# Patient Record
Sex: FEMALE | Race: WHITE | Employment: OTHER | URBAN - METROPOLITAN AREA
[De-identification: names, ages, dates, MRNs, and addresses within clinical notes are randomized per-mention and may not be internally consistent; named-entity substitution may affect disease eponyms.]

---

## 2017-11-16 ENCOUNTER — HOSPITAL ENCOUNTER (OUTPATIENT)
Dept: GENERAL RADIOLOGY | Age: 82
Discharge: HOME | End: 2017-11-16
Attending: FAMILY MEDICINE | Admitting: FAMILY MEDICINE

## 2017-11-16 DIAGNOSIS — M54.5 LOW BACK PAIN, UNSPECIFIED BACK PAIN LATERALITY, UNSPECIFIED CHRONICITY, WITH SCIATICA PRESENCE UNSPECIFIED: ICD-10-CM

## 2018-08-06 ENCOUNTER — HOSPITAL ENCOUNTER (OUTPATIENT)
Dept: GENERAL RADIOLOGY | Age: 83
Discharge: HOME | End: 2018-08-06
Attending: FAMILY MEDICINE | Admitting: FAMILY MEDICINE

## 2018-08-06 DIAGNOSIS — M54.41 ACUTE BACK PAIN WITH SCIATICA, RIGHT: ICD-10-CM

## 2018-08-06 DIAGNOSIS — M25.551 RIGHT HIP PAIN: ICD-10-CM

## 2020-08-13 ENCOUNTER — HOSPITAL ENCOUNTER (OUTPATIENT)
Age: 85
Setting detail: SPECIMEN
Discharge: HOME | End: 2020-08-13
Payer: MEDICARE

## 2020-08-13 PROCEDURE — U0002 COVID-19 LAB TEST NON-CDC: HCPCS

## 2020-08-14 LAB
SARS-COV-2: NOT DETECTED
SOURCE: NORMAL

## 2021-01-07 ENCOUNTER — HOSPITAL ENCOUNTER (INPATIENT)
Age: 86
LOS: 5 days | Discharge: HOME HEALTH | DRG: 378 | End: 2021-01-12
Attending: EMERGENCY MEDICINE | Admitting: HOSPITALIST
Payer: MEDICARE

## 2021-01-07 ENCOUNTER — APPOINTMENT (OUTPATIENT)
Dept: CT IMAGING | Age: 86
DRG: 378 | End: 2021-01-07
Payer: MEDICARE

## 2021-01-07 DIAGNOSIS — K62.5 RECTAL BLEEDING: Primary | ICD-10-CM

## 2021-01-07 DIAGNOSIS — Z79.01 ANTICOAGULATED: ICD-10-CM

## 2021-01-07 LAB
ABO/RH: NORMAL
ABO/RH: NORMAL
ALBUMIN SERPL-MCNC: 3.8 GM/DL (ref 3.4–5)
ALP BLD-CCNC: 75 IU/L (ref 40–128)
ALT SERPL-CCNC: 14 U/L (ref 10–40)
ANION GAP SERPL CALCULATED.3IONS-SCNC: 11 MMOL/L (ref 4–16)
ANTIBODY SCREEN: NEGATIVE
ANTIBODY SCREEN: NEGATIVE
AST SERPL-CCNC: 22 IU/L (ref 15–37)
BASOPHILS ABSOLUTE: 0.1 K/CU MM
BASOPHILS RELATIVE PERCENT: 0.7 % (ref 0–1)
BILIRUB SERPL-MCNC: 0.6 MG/DL (ref 0–1)
BUN BLDV-MCNC: 16 MG/DL (ref 6–23)
CALCIUM SERPL-MCNC: 8.5 MG/DL (ref 8.3–10.6)
CHLORIDE BLD-SCNC: 110 MMOL/L (ref 99–110)
CO2: 24 MMOL/L (ref 21–32)
CREAT SERPL-MCNC: 1 MG/DL (ref 0.6–1.1)
DIFFERENTIAL TYPE: ABNORMAL
EOSINOPHILS ABSOLUTE: 0.1 K/CU MM
EOSINOPHILS RELATIVE PERCENT: 0.8 % (ref 0–3)
GFR AFRICAN AMERICAN: >60 ML/MIN/1.73M2
GFR NON-AFRICAN AMERICAN: 52 ML/MIN/1.73M2
GLUCOSE BLD-MCNC: 125 MG/DL (ref 70–99)
HCT VFR BLD CALC: 38.7 % (ref 37–47)
HEMOGLOBIN: 11.8 GM/DL (ref 12.5–16)
IMMATURE NEUTROPHIL %: 0.3 % (ref 0–0.43)
INR BLD: 2.42 INDEX
LYMPHOCYTES ABSOLUTE: 1 K/CU MM
LYMPHOCYTES RELATIVE PERCENT: 13.5 % (ref 24–44)
MCH RBC QN AUTO: 28.9 PG (ref 27–31)
MCHC RBC AUTO-ENTMCNC: 30.5 % (ref 32–36)
MCV RBC AUTO: 94.6 FL (ref 78–100)
MONOCYTES ABSOLUTE: 0.6 K/CU MM
MONOCYTES RELATIVE PERCENT: 8.5 % (ref 0–4)
NUCLEATED RBC %: 0 %
PDW BLD-RTO: 13.6 % (ref 11.7–14.9)
PLATELET # BLD: 202 K/CU MM (ref 140–440)
PMV BLD AUTO: 10.7 FL (ref 7.5–11.1)
POTASSIUM SERPL-SCNC: 3.9 MMOL/L (ref 3.5–5.1)
PROTHROMBIN TIME: 29.5 SECONDS (ref 11.7–14.5)
RBC # BLD: 4.09 M/CU MM (ref 4.2–5.4)
SARS-COV-2, NAAT: NOT DETECTED
SEGMENTED NEUTROPHILS ABSOLUTE COUNT: 5.5 K/CU MM
SEGMENTED NEUTROPHILS RELATIVE PERCENT: 76.2 % (ref 36–66)
SODIUM BLD-SCNC: 145 MMOL/L (ref 135–145)
SOURCE: NORMAL
TOTAL IMMATURE NEUTOROPHIL: 0.02 K/CU MM
TOTAL NUCLEATED RBC: 0 K/CU MM
TOTAL PROTEIN: 6 GM/DL (ref 6.4–8.2)
WBC # BLD: 7.2 K/CU MM (ref 4–10.5)

## 2021-01-07 PROCEDURE — 6360000002 HC RX W HCPCS: Performed by: NURSE PRACTITIONER

## 2021-01-07 PROCEDURE — 6360000004 HC RX CONTRAST MEDICATION: Performed by: EMERGENCY MEDICINE

## 2021-01-07 PROCEDURE — 86900 BLOOD TYPING SEROLOGIC ABO: CPT

## 2021-01-07 PROCEDURE — U0002 COVID-19 LAB TEST NON-CDC: HCPCS

## 2021-01-07 PROCEDURE — 99283 EMERGENCY DEPT VISIT LOW MDM: CPT

## 2021-01-07 PROCEDURE — 85610 PROTHROMBIN TIME: CPT

## 2021-01-07 PROCEDURE — 36415 COLL VENOUS BLD VENIPUNCTURE: CPT

## 2021-01-07 PROCEDURE — 86850 RBC ANTIBODY SCREEN: CPT

## 2021-01-07 PROCEDURE — 74177 CT ABD & PELVIS W/CONTRAST: CPT

## 2021-01-07 PROCEDURE — 2580000003 HC RX 258: Performed by: NURSE PRACTITIONER

## 2021-01-07 PROCEDURE — C9113 INJ PANTOPRAZOLE SODIUM, VIA: HCPCS | Performed by: NURSE PRACTITIONER

## 2021-01-07 PROCEDURE — 85025 COMPLETE CBC W/AUTO DIFF WBC: CPT

## 2021-01-07 PROCEDURE — 85018 HEMOGLOBIN: CPT

## 2021-01-07 PROCEDURE — 86901 BLOOD TYPING SEROLOGIC RH(D): CPT

## 2021-01-07 PROCEDURE — 1200000000 HC SEMI PRIVATE

## 2021-01-07 PROCEDURE — 80053 COMPREHEN METABOLIC PANEL: CPT

## 2021-01-07 PROCEDURE — 2580000003 HC RX 258: Performed by: EMERGENCY MEDICINE

## 2021-01-07 PROCEDURE — 6370000000 HC RX 637 (ALT 250 FOR IP): Performed by: NURSE PRACTITIONER

## 2021-01-07 RX ORDER — PRAVASTATIN SODIUM 10 MG
20 TABLET ORAL NIGHTLY
Status: DISCONTINUED | OUTPATIENT
Start: 2021-01-07 | End: 2021-01-12 | Stop reason: HOSPADM

## 2021-01-07 RX ORDER — CLOTRIMAZOLE AND BETAMETHASONE DIPROPIONATE 10; .64 MG/G; MG/G
CREAM TOPICAL
COMMUNITY
Start: 2021-01-06 | End: 2022-01-06

## 2021-01-07 RX ORDER — DONEPEZIL HYDROCHLORIDE 5 MG/1
5 TABLET, FILM COATED ORAL NIGHTLY
Status: DISCONTINUED | OUTPATIENT
Start: 2021-01-07 | End: 2021-01-12 | Stop reason: HOSPADM

## 2021-01-07 RX ORDER — CLOTRIMAZOLE AND BETAMETHASONE DIPROPIONATE 10; .64 MG/G; MG/G
CREAM TOPICAL 2 TIMES DAILY
Status: DISCONTINUED | OUTPATIENT
Start: 2021-01-07 | End: 2021-01-12 | Stop reason: HOSPADM

## 2021-01-07 RX ORDER — PRAVASTATIN SODIUM 40 MG
TABLET ORAL
COMMUNITY
Start: 2020-09-21 | End: 2022-08-08

## 2021-01-07 RX ORDER — PROMETHAZINE HYDROCHLORIDE 12.5 MG/1
12.5 TABLET ORAL EVERY 6 HOURS PRN
Status: DISCONTINUED | OUTPATIENT
Start: 2021-01-07 | End: 2021-01-12 | Stop reason: HOSPADM

## 2021-01-07 RX ORDER — SODIUM CHLORIDE 0.9 % (FLUSH) 0.9 %
10 SYRINGE (ML) INJECTION EVERY 12 HOURS SCHEDULED
Status: DISCONTINUED | OUTPATIENT
Start: 2021-01-07 | End: 2021-01-12 | Stop reason: HOSPADM

## 2021-01-07 RX ORDER — 0.9 % SODIUM CHLORIDE 0.9 %
10 VIAL (ML) INJECTION
Status: COMPLETED | OUTPATIENT
Start: 2021-01-07 | End: 2021-01-07

## 2021-01-07 RX ORDER — ACETAMINOPHEN 325 MG/1
650 TABLET ORAL EVERY 6 HOURS PRN
Status: DISCONTINUED | OUTPATIENT
Start: 2021-01-07 | End: 2021-01-12 | Stop reason: HOSPADM

## 2021-01-07 RX ORDER — ACETAMINOPHEN 650 MG/1
650 SUPPOSITORY RECTAL EVERY 6 HOURS PRN
Status: DISCONTINUED | OUTPATIENT
Start: 2021-01-07 | End: 2021-01-12 | Stop reason: HOSPADM

## 2021-01-07 RX ORDER — SODIUM CHLORIDE 9 MG/ML
10 INJECTION INTRAVENOUS EVERY 12 HOURS
Status: DISCONTINUED | OUTPATIENT
Start: 2021-01-07 | End: 2021-01-12 | Stop reason: HOSPADM

## 2021-01-07 RX ORDER — SODIUM CHLORIDE 0.9 % (FLUSH) 0.9 %
10 SYRINGE (ML) INJECTION PRN
Status: DISCONTINUED | OUTPATIENT
Start: 2021-01-07 | End: 2021-01-12 | Stop reason: HOSPADM

## 2021-01-07 RX ORDER — SODIUM CHLORIDE 9 MG/ML
INJECTION, SOLUTION INTRAVENOUS CONTINUOUS
Status: DISCONTINUED | OUTPATIENT
Start: 2021-01-07 | End: 2021-01-10

## 2021-01-07 RX ORDER — LISINOPRIL 40 MG/1
TABLET ORAL
Status: ON HOLD | COMMUNITY
Start: 2020-05-07 | End: 2022-06-03 | Stop reason: HOSPADM

## 2021-01-07 RX ORDER — ONDANSETRON 2 MG/ML
4 INJECTION INTRAMUSCULAR; INTRAVENOUS EVERY 6 HOURS PRN
Status: DISCONTINUED | OUTPATIENT
Start: 2021-01-07 | End: 2021-01-12 | Stop reason: HOSPADM

## 2021-01-07 RX ORDER — LISINOPRIL 40 MG/1
40 TABLET ORAL DAILY
Status: DISCONTINUED | OUTPATIENT
Start: 2021-01-07 | End: 2021-01-12 | Stop reason: HOSPADM

## 2021-01-07 RX ORDER — DONEPEZIL HYDROCHLORIDE 5 MG/1
TABLET, FILM COATED ORAL
COMMUNITY
Start: 2020-10-15

## 2021-01-07 RX ORDER — PANTOPRAZOLE SODIUM 40 MG/10ML
40 INJECTION, POWDER, LYOPHILIZED, FOR SOLUTION INTRAVENOUS EVERY 12 HOURS
Status: DISCONTINUED | OUTPATIENT
Start: 2021-01-07 | End: 2021-01-12 | Stop reason: HOSPADM

## 2021-01-07 RX ADMIN — SODIUM CHLORIDE, PRESERVATIVE FREE 10 ML: 5 INJECTION INTRAVENOUS at 18:47

## 2021-01-07 RX ADMIN — LISINOPRIL 40 MG: 40 TABLET ORAL at 18:43

## 2021-01-07 RX ADMIN — PANTOPRAZOLE SODIUM 40 MG: 40 INJECTION, POWDER, FOR SOLUTION INTRAVENOUS at 18:43

## 2021-01-07 RX ADMIN — SODIUM CHLORIDE: 9 INJECTION, SOLUTION INTRAVENOUS at 19:55

## 2021-01-07 RX ADMIN — SODIUM CHLORIDE: 9 INJECTION, SOLUTION INTRAVENOUS at 18:46

## 2021-01-07 RX ADMIN — SODIUM CHLORIDE INJ 0.9% 10 ML: 0.9 SOLUTION at 11:52

## 2021-01-07 RX ADMIN — IOPAMIDOL 75 ML: 755 INJECTION, SOLUTION INTRAVENOUS at 11:52

## 2021-01-07 RX ADMIN — PRAVASTATIN SODIUM 20 MG: 10 TABLET ORAL at 19:55

## 2021-01-07 RX ADMIN — DONEPEZIL HYDROCHLORIDE 5 MG: 5 TABLET, FILM COATED ORAL at 19:55

## 2021-01-07 ASSESSMENT — PAIN SCALES - GENERAL: PAINLEVEL_OUTOF10: 0

## 2021-01-07 NOTE — ED PROVIDER NOTES
Triage Chief Complaint:   Rectal Bleeding (for approx. 3 days. has seen PCP. advised to come to ER if worse.)      Alutiiq:  Anuj Calderon is a 80 y.o. female that presents to the emergency department with rectal bleeding for 3 days. Does appear for a med list she is on Xarelto. She states it is bright red. She states she has had 3 episodes total.  Has not had any bleeding today however has not yet had a bowel movement. Denies any rectal pain. Denies any abdominal pain. No fevers or chills. States she saw her PCP in the office yesterday and was told that if she continued having symptoms to go to the ER. She does have paperwork from the office that stated they would check a CBC and try to get her referred to GI this week for possible anoscopy. Patient denies previous colonoscopy. Per chart review her hemoglobin yesterday was 12.5. She is denying any dizziness with standing. No rectal pain. No abdominal pain. No chest pain or pressure. No dizziness, lightheadedness, shortness of breath. Denies any previous history of rectal bleeding. Past Medical History:   Diagnosis Date    Hypertension      History reviewed. No pertinent surgical history. History reviewed. No pertinent family history. Social History     Socioeconomic History    Marital status:       Spouse name: Not on file    Number of children: Not on file    Years of education: Not on file    Highest education level: Not on file   Occupational History    Not on file   Social Needs    Financial resource strain: Not on file    Food insecurity     Worry: Not on file     Inability: Not on file    Transportation needs     Medical: Not on file     Non-medical: Not on file   Tobacco Use    Smoking status: Current Every Day Smoker     Packs/day: 0.25    Smokeless tobacco: Never Used   Substance and Sexual Activity    Alcohol use: Not on file    Drug use: Not on file    Sexual activity: Not on file   Lifestyle    Physical have reviewed and interpreted all of the currently available lab results from this visit (if applicable):  Results for orders placed or performed during the hospital encounter of 01/07/21   CBC with Auto Diff   Result Value Ref Range    WBC 7.2 4.0 - 10.5 K/CU MM    RBC 4.09 (L) 4.2 - 5.4 M/CU MM    Hemoglobin 11.8 (L) 12.5 - 16.0 GM/DL    Hematocrit 38.7 37 - 47 %    MCV 94.6 78 - 100 FL    MCH 28.9 27 - 31 PG    MCHC 30.5 (L) 32.0 - 36.0 %    RDW 13.6 11.7 - 14.9 %    Platelets 073 661 - 353 K/CU MM    MPV 10.7 7.5 - 11.1 FL    Differential Type AUTOMATED DIFFERENTIAL     Segs Relative 76.2 (H) 36 - 66 %    Lymphocytes % 13.5 (L) 24 - 44 %    Monocytes % 8.5 (H) 0 - 4 %    Eosinophils % 0.8 0 - 3 %    Basophils % 0.7 0 - 1 %    Segs Absolute 5.5 K/CU MM    Lymphocytes Absolute 1.0 K/CU MM    Monocytes Absolute 0.6 K/CU MM    Eosinophils Absolute 0.1 K/CU MM    Basophils Absolute 0.1 K/CU MM    Nucleated RBC % 0.0 %    Total Nucleated RBC 0.0 K/CU MM    Total Immature Neutrophil 0.02 K/CU MM    Immature Neutrophil % 0.3 0 - 0.43 %   CMP   Result Value Ref Range    Sodium 145 135 - 145 MMOL/L    Potassium 3.9 3.5 - 5.1 MMOL/L    Chloride 110 99 - 110 mMol/L    CO2 24 21 - 32 MMOL/L    BUN 16 6 - 23 MG/DL    CREATININE 1.0 0.6 - 1.1 MG/DL    Glucose 125 (H) 70 - 99 MG/DL    Calcium 8.5 8.3 - 10.6 MG/DL    Alb 3.8 3.4 - 5.0 GM/DL    Total Protein 6.0 (L) 6.4 - 8.2 GM/DL    Total Bilirubin 0.6 0.0 - 1.0 MG/DL    ALT 14 10 - 40 U/L    AST 22 15 - 37 IU/L    Alkaline Phosphatase 75 40 - 128 IU/L    GFR Non- 52 (L) >60 mL/min/1.73m2    GFR African American >60 >60 mL/min/1.73m2    Anion Gap 11 4 - 16   PT - INR   Result Value Ref Range    Protime 29.5 (H) 11.7 - 14.5 SECONDS    INR 2.42 INDEX   TYPE AND SCREEN   Result Value Ref Range    ABO/Rh A POSITIVE     Antibody Screen NEGATIVE         Radiographs:  [] Radiologist's Wet Read Report Reviewed:     [] Discussed with Radiologist:     [] The following Soren Shabazz MD  01/07/21 0938

## 2021-01-07 NOTE — ED TRIAGE NOTES
Patient to rm. 27 via EMS with c/o rectal bleeding that started approx. 3 days ago. Patient takes xarelto at this time. Was seen by PCP and told to follow up with Dr. Malka Graves and come here if it was worse. States it became worse this morning. Resps even and unlabored.

## 2021-01-07 NOTE — H&P
History and Physical      Name:  Andrés Turner /Age/Sex: 1927  (80 y.o. female)   MRN & CSN:  [id number] & [id number] Admission Date/Time: 2021 10:01 AM   Location:  ED27/ED-27 PCP: Martina Jha MD       Hospital Day: 1        Admitting Physician: Dr. Aleyda Hill and Plan:   Andrés Turner is a 80 y.o. female who presents with Rectal Bleeding (for approx. 3 days. has seen PCP. advised to come to ER if worse.)     Rectal bleeding- x 3 days. H/H      Admit to MedSurg   Hold anticoagulation   Serial H/H q 6 hr/ T&S   IV PPI  BID    Consult GI initiated in the emergency room    Clear liquid diet     Maculopapular rash-right lower extremity.-Continue Lotrimin      Essential hypertension- continue home antihypertensive regimen- Monitor BP trends.  Chronic DVT-LLE-chronic anticoagulation-Xarelto   Holding medications for now     Mild cognitive impairment-continue Aricept     Patient case discussed with ED provider    Diet Clear liquid   DVT Prophylaxis [] Lovenox, []  Heparin, [x] SCDs, [] Ambulation  [] Long term AC   GI Prophylaxis [x] PPI,  [] H2 Blocker,  [] Carafate,  [] Diet/Tube Feeds   Code Status Full     Disposition Admit to inpatient. Patient plans to return home upon discharge   MDM [] Low, [] Moderate,[x]  High     -Patient assessment and plan discussed and reviewed with admitting physician: Sarah Meier MD.     History of Present Illness:     Chief Complaint: Rectal Bleeding (for approx. 3 days. has seen PCP. advised to come to ER if worse.)    Andrés Turner is a 80 y.o. female who presents with concerns of 3 days of bright red blood per rectum. Reports occurrence with her bowel movement. She is on a blood thinner. She was evaluated by PCP with recommendation to seek emergency room evaluation if continued. This is what prompted visit today. She denies any bleeding episodes today but states no bowel movements today yet.   Care coordination attempted in ER to discharge patient back to [facility] but unable to coordinate with GI office for outpatient follow-up/ scheduling and transportatio. GI consulted-Dr. Juwan Tafoya recommended admission. Denies HA, vision changes, Dizziness, fever, chills, sweats, CP, shortness of breath, wheezing, congestion, cough, abdominal pain, diarrhea, constipation, or dysuria. She does report a worsening rash to her right lower extremity that her PCP prescribed steroid cream and this Medrol Dosepak. States has not picked up medication and noticing new lesions to the left lower extremity. Ten point ROS: reviewed negative, unless as noted in above HPI. Objective:   No intake or output data in the 24 hours ending 01/07/21 1346     Vitals:   Vitals:    01/07/21 1009   BP: (!) 152/75   Pulse: 109   Resp: 20   Temp: 98.5 °F (36.9 °C)   TempSrc: Oral   SpO2: 93%   Weight: 145 lb (65.8 kg)   Height: 5' (1.524 m)       Physical Exam: 01/07/21     GEN -Awake nontoxic appearing female, sitting upright in bed , NAD. RA body habitus. Appears given age. EYES -PERRLA. No scleral erythema, discharge, or conjunctivitis. HENT -MM are moist. Oral pharynx without exudates, no evidence of thrush. NECK -Supple, no apparent thyromegaly or masses. RESP -CTA, no wheezes, rales or rhonchi. Symmetric chest movement while on RA.   C/V -S1/S2 auscultated. RRR without appreciable M/R/G. No JVD or carotid bruits. Peripheral pulses equal bilaterally and palpable. Cap refill <3 sec. +2 peripheral edema. Compression stockings in place  GI -Abdomen is soft non distended and without significant TTP. + BS. No masses or guarding. Rectal exam deferred. No HSM   -No CVA/ flank tenderness. Kline catheter is not present. LYMPH-No palpable cervical lymphadenopathy and no hepatosplenomegaly. No petechiae or ecchymoses. MS -No gross joint deformities. SKIN -Normal coloration, warm, dry.   NEURO-Cranial nerves appear grossly intact,

## 2021-01-07 NOTE — ED NOTES
Bed: ED-27  Expected date:   Expected time:   Means of arrival:   Comments:  Rectal bleeding EMS     Ethan Cifuentes RN  01/07/21 1002

## 2021-01-07 NOTE — ED NOTES
1231 paged Dr Glenny Soliz  01/07/21 1231  1255 repaged Dr Glenny Soliz  01/07/21 1256  1307 Dr Mega Shabazz returned call      [facility]  01/07/21 1313

## 2021-01-08 ENCOUNTER — ANESTHESIA EVENT (OUTPATIENT)
Dept: ENDOSCOPY | Age: 86
DRG: 378 | End: 2021-01-08
Payer: MEDICARE

## 2021-01-08 LAB
ALBUMIN SERPL-MCNC: 3.5 GM/DL (ref 3.4–5)
ALP BLD-CCNC: 68 IU/L (ref 40–129)
ALT SERPL-CCNC: 11 U/L (ref 10–40)
ANION GAP SERPL CALCULATED.3IONS-SCNC: 6 MMOL/L (ref 4–16)
AST SERPL-CCNC: 17 IU/L (ref 15–37)
BILIRUB SERPL-MCNC: 0.5 MG/DL (ref 0–1)
BUN BLDV-MCNC: 10 MG/DL (ref 6–23)
CALCIUM SERPL-MCNC: 8.3 MG/DL (ref 8.3–10.6)
CHLORIDE BLD-SCNC: 109 MMOL/L (ref 99–110)
CO2: 26 MMOL/L (ref 21–32)
CREAT SERPL-MCNC: 1 MG/DL (ref 0.6–1.1)
GFR AFRICAN AMERICAN: >60 ML/MIN/1.73M2
GFR NON-AFRICAN AMERICAN: 52 ML/MIN/1.73M2
GLUCOSE BLD-MCNC: 90 MG/DL (ref 70–99)
HCT VFR BLD CALC: 32.5 % (ref 37–47)
HCT VFR BLD CALC: 35.1 % (ref 37–47)
HCT VFR BLD CALC: 40.5 % (ref 37–47)
HEMOGLOBIN: 10 GM/DL (ref 12.5–16)
HEMOGLOBIN: 10.7 GM/DL (ref 12.5–16)
HEMOGLOBIN: 12.3 GM/DL (ref 12.5–16)
INR BLD: 1.25 INDEX
IRON: 43 UG/DL (ref 37–145)
MAGNESIUM: 3 MG/DL (ref 1.8–2.4)
MCH RBC QN AUTO: 28.5 PG (ref 27–31)
MCHC RBC AUTO-ENTMCNC: 30.5 % (ref 32–36)
MCV RBC AUTO: 93.6 FL (ref 78–100)
PCT TRANSFERRIN: 17 % (ref 10–44)
PDW BLD-RTO: 13.7 % (ref 11.7–14.9)
PLATELET # BLD: 190 K/CU MM (ref 140–440)
PMV BLD AUTO: 10.3 FL (ref 7.5–11.1)
POTASSIUM SERPL-SCNC: 4.2 MMOL/L (ref 3.5–5.1)
PROTHROMBIN TIME: 15.1 SECONDS (ref 11.7–14.5)
RBC # BLD: 3.75 M/CU MM (ref 4.2–5.4)
SODIUM BLD-SCNC: 141 MMOL/L (ref 135–145)
TOTAL IRON BINDING CAPACITY: 252 UG/DL (ref 250–450)
TOTAL PROTEIN: 5.3 GM/DL (ref 6.4–8.2)
UNSATURATED IRON BINDING CAPACITY: 209 UG/DL (ref 110–370)
WBC # BLD: 5.9 K/CU MM (ref 4–10.5)

## 2021-01-08 PROCEDURE — 1200000000 HC SEMI PRIVATE

## 2021-01-08 PROCEDURE — 85014 HEMATOCRIT: CPT

## 2021-01-08 PROCEDURE — 6370000000 HC RX 637 (ALT 250 FOR IP): Performed by: SPECIALIST

## 2021-01-08 PROCEDURE — 6370000000 HC RX 637 (ALT 250 FOR IP): Performed by: NURSE PRACTITIONER

## 2021-01-08 PROCEDURE — 85018 HEMOGLOBIN: CPT

## 2021-01-08 PROCEDURE — 36415 COLL VENOUS BLD VENIPUNCTURE: CPT

## 2021-01-08 PROCEDURE — 83735 ASSAY OF MAGNESIUM: CPT

## 2021-01-08 PROCEDURE — 97530 THERAPEUTIC ACTIVITIES: CPT

## 2021-01-08 PROCEDURE — 85610 PROTHROMBIN TIME: CPT

## 2021-01-08 PROCEDURE — 83550 IRON BINDING TEST: CPT

## 2021-01-08 PROCEDURE — 6360000002 HC RX W HCPCS: Performed by: NURSE PRACTITIONER

## 2021-01-08 PROCEDURE — 83540 ASSAY OF IRON: CPT

## 2021-01-08 PROCEDURE — 85027 COMPLETE CBC AUTOMATED: CPT

## 2021-01-08 PROCEDURE — C9113 INJ PANTOPRAZOLE SODIUM, VIA: HCPCS | Performed by: NURSE PRACTITIONER

## 2021-01-08 PROCEDURE — 6360000002 HC RX W HCPCS: Performed by: INTERNAL MEDICINE

## 2021-01-08 PROCEDURE — 80053 COMPREHEN METABOLIC PANEL: CPT

## 2021-01-08 PROCEDURE — 97116 GAIT TRAINING THERAPY: CPT

## 2021-01-08 PROCEDURE — 94761 N-INVAS EAR/PLS OXIMETRY MLT: CPT

## 2021-01-08 PROCEDURE — 97163 PT EVAL HIGH COMPLEX 45 MIN: CPT

## 2021-01-08 RX ORDER — MAGNESIUM SULFATE IN WATER 40 MG/ML
2 INJECTION, SOLUTION INTRAVENOUS ONCE
Status: COMPLETED | OUTPATIENT
Start: 2021-01-08 | End: 2021-01-08

## 2021-01-08 RX ADMIN — POLYETHYLENE GLYCOL 3350, SODIUM SULFATE ANHYDROUS, SODIUM BICARBONATE, SODIUM CHLORIDE, POTASSIUM CHLORIDE 4000 ML: 236; 22.74; 6.74; 5.86; 2.97 POWDER, FOR SOLUTION ORAL at 18:27

## 2021-01-08 RX ADMIN — PANTOPRAZOLE SODIUM 40 MG: 40 INJECTION, POWDER, FOR SOLUTION INTRAVENOUS at 16:51

## 2021-01-08 RX ADMIN — PRAVASTATIN SODIUM 20 MG: 10 TABLET ORAL at 20:12

## 2021-01-08 RX ADMIN — PANTOPRAZOLE SODIUM 40 MG: 40 INJECTION, POWDER, FOR SOLUTION INTRAVENOUS at 06:54

## 2021-01-08 RX ADMIN — LISINOPRIL 40 MG: 40 TABLET ORAL at 07:45

## 2021-01-08 RX ADMIN — MAGNESIUM SULFATE HEPTAHYDRATE 2 G: 40 INJECTION, SOLUTION INTRAVENOUS at 14:15

## 2021-01-08 ASSESSMENT — LIFESTYLE VARIABLES: SMOKING_STATUS: 1

## 2021-01-08 ASSESSMENT — PAIN SCALES - GENERAL: PAINLEVEL_OUTOF10: 0

## 2021-01-08 NOTE — CARE COORDINATION
Referral received for ARU. Patient does not meet criteria per [employer]'s guidelines for ARU. Would defer to lower level of care.

## 2021-01-08 NOTE — ANESTHESIA PRE PROCEDURE
Department of Anesthesiology  Preprocedure Note       Name:  Anuj Calderon   Age:  80 y.o.  :  1927                                          MRN:  [id number]         Date:  2021      Surgeon: Jacquelin Patel):  Sheng Quintana MD    Procedure: Procedure(s):  EGD ESOPHAGOGASTRODUODENOSCOPY    Medications prior to admission:   Prior to Admission medications    Medication Sig Start Date End Date Taking? Authorizing Provider   clotrimazole-betamethasone (LOTRISONE) 1-0.05 % cream Apply to affected areas on lower legs/arms/neck as needed for up to 2 weeks for rash. 21 Yes Historical Provider, MD   donepezil (ARICEPT) 5 MG tablet TAKE ONE TABLET BY MOUTH EVERY EVENING 10/15/20  Yes Historical Provider, MD   lisinopril (PRINIVIL;ZESTRIL) 40 MG tablet TAKE ONE TABLET BY MOUTH EVERY DAY FOR high blood pressure; may take at night.  20  Yes Historical Provider, MD   pravastatin (PRAVACHOL) 40 MG tablet TAKE ONE TABLET BY MOUTH AT BEDTIME 20  Yes Historical Provider, MD   rivaroxaban (XARELTO) 20 MG TABS tablet TAKE ONE TABLET BY MOUTH EVERY DAY with evening meal FOR blood clots 3/9/20  Yes Historical Provider, MD       Current medications:    Current Facility-Administered Medications   Medication Dose Route Frequency Provider Last Rate Last Admin    polyethylene glycol (GoLYTELY) solution 4,000 mL  4,000 mL Oral Once Sheng Quintana MD        magnesium sulfate 2 g in 50 mL IVPB premix  2 g Intravenous Once Courtney Montez MD 25 mL/hr at 21 1415 2 g at 21 1415    sodium chloride flush 0.9 % injection 10 mL  10 mL Intravenous 2 times per day KIKI Orozco CNP        sodium chloride flush 0.9 % injection 10 mL  10 mL Intravenous PRN KIKI Orozco CNP        promethazine (PHENERGAN) tablet 12.5 mg  12.5 mg Oral Q6H PRN KIKI Orozco CNP        Or    ondansetron (ZOFRAN) injection 4 mg  4 mg Intravenous Q6H PRN KIKI Orozco CNP        acetaminophen (TYLENOL) Status:                                                                                 BMI:   Wt Readings from Last 3 Encounters:   01/07/21 145 lb (65.8 kg)     Body mass index is 28.32 kg/m². CBC:   Lab Results   Component Value Date    WBC 5.9 01/08/2021    RBC 3.75 01/08/2021    HGB 10.7 01/08/2021    HCT 35.1 01/08/2021    MCV 93.6 01/08/2021    RDW 13.7 01/08/2021     01/08/2021       CMP:   Lab Results   Component Value Date     01/08/2021    K 4.2 01/08/2021     01/08/2021    CO2 26 01/08/2021    BUN 10 01/08/2021    CREATININE 1.0 01/08/2021    GFRAA >60 01/08/2021    LABGLOM 52 01/08/2021    GLUCOSE 90 01/08/2021    PROT 5.3 01/08/2021    CALCIUM 8.3 01/08/2021    BILITOT 0.5 01/08/2021    ALKPHOS 68 01/08/2021    AST 17 01/08/2021    ALT 11 01/08/2021       POC Tests: No results for input(s): POCGLU, POCNA, POCK, POCCL, POCBUN, POCHEMO, POCHCT in the last 72 hours. Coags:   Lab Results   Component Value Date    PROTIME 15.1 01/08/2021    INR 1.25 01/08/2021       HCG (If Applicable): No results found for: PREGTESTUR, PREGSERUM, HCG, HCGQUANT     ABGs: No results found for: PHART, PO2ART, KVA1WGT, ZXI2LLD, BEART, Q0PBKGHU     Type & Screen (If Applicable):  No results found for: LABABO, LABRH    Drug/Infectious Status (If Applicable):  No results found for: HIV, HEPCAB    COVID-19 Screening (If Applicable):   Lab Results   Component Value Date    COVID19 NOT DETECTED 01/07/2021    COVID19 NOT DETECTED 08/13/2020         Anesthesia Evaluation  Patient summary reviewed  Airway: Mallampati: II  TM distance: >3 FB   Neck ROM: full  Mouth opening: > = 3 FB Dental:          Pulmonary:normal exam    (+) current smoker                           Cardiovascular:    (+) hypertension:, hyperlipidemia         Beta Blocker:  Not on Beta Blocker         Neuro/Psych:   Negative Neuro/Psych ROS              GI/Hepatic/Renal:            ROS comment: GI bleed.    Endo/Other: Negative Endo/Other ROS Abdominal:           Vascular: negative vascular ROS. Anesthesia Plan      MAC     ASA 3       Induction: intravenous. MIPS: Postoperative opioids intended. Anesthetic plan and risks discussed with patient. Plan discussed with CRNA. Attending anesthesiologist reviewed and agrees with Pre Eval content            KIKI Lyles - CRNA   1/8/2021         Pre Anesthesia Assessment complete.  Chart reviewed on 1/8/2021

## 2021-01-08 NOTE — CARE COORDINATION
Chart reviewed. Met with the patient at bedside. She is very hard of hearing but is completely alert and oriented x4. She states that she is from [address] [place] [address] and was independent in all ADL's prior to this hospital stay. She states that this is her first time in the hospital. She states she has always been healthy and has never had to stay in a hospital before. She states that her meals and medications are brought to her. She has a car but it is in the [address] at [place] so she will need transportation back to [place] at d/c. She has been independent in ADL's. PT has updated this CM that ARU could be a possibility for this patient. CM talked to the patient about ARU and she states she doesn't want to stay any longer then needed but if ARU is needed she will likely go. CM called ARU to follow. The patient hopes she can go back to [address] [place] [address] at d/c.

## 2021-01-08 NOTE — CONSULTS
[address] [place] [address], [address] [facility]                                  CONSULTATION    PATIENT NAME: Stef Jovel                 :        1927  MED REC NO:   [id number]                          ROOM:       [id number]  ACCOUNT NO:   [de-identified]                           ADMIT DATE: 2021  PROVIDER:     Dottie Kaur MD    CONSULT DATE:  2021    PRIMARY CARE PROVIDER:  Hector Cook DO    CHIEF COMPLAINT:  History of hematochezia; rule out lower GI bleeding. HISTORY OF PRESENT ILLNESS:  The patient is a 80-year-old female with  past medical history significant for hypertension. The patient on  Xarelto who presented to the emergency room yesterday with 3-day history  of painless hematochezia. The patient's hemoglobin upon admission was  11.8 gm percent and after hydration has dropped to 10 gm percent. The  patient did have the CAT scan done of the abdomen and pelvis which was  negative for acute findings, however, a 3.7 cm abdominal aortic aneurysm  was noted. The patient is hemodynamically stable and denies prior episodes of  hematochezia. The patient has never had an EGD or colonoscopy done in  the past.    REVIEW OF SYSTEMS:  CENTRAL NERVOUS SYSTEM:  The patient denies headache or focal  sensorimotor symptoms. CARDIOVASCULAR SYSTEM:  No history of chest pain, shortness of breath,  or leg swelling. GENITOURINARY SYSTEM:  No history of dysuria, pyuria, or hematuria. MUSCULOSKELETAL SYSTEM:  No history of aches and pains in muscles and  joints. RESPIRATORY SYSTEM:  No history of cough, hemoptysis, fever, or chills. PAST MEDICAL HISTORY:  Significant for history of hypertension. FAMILY HISTORY:  Noncontributory. MEDICATIONS:  Please refer to the chart. SOCIOECONOMIC HISTORY:  The patient is a former smoker. There is no  history of EtOH abuse. PAST SURGICAL HISTORY:  None.     ALLERGIES:  No known drug allergies. PHYSICAL EXAMINATION:  GENERAL:  Shows a 80-year-old white female of average build and fairly  good nutritional status for her age, who is lying flat in bed, in no  acute distress. She is awake, alert, and oriented and pleasant to talk  with. VITAL SIGNS:  Stable. HEENT:  Shows skull to be atraumatic. NECK:  Supple. CHEST:  Clear. HEART:  S1 and S2 are normal.  ABDOMEN:  Soft, nontender, and nondistended. Liver and spleen are not  palpable. Bowel sounds are present. RECTAL:  Deferred. CNS:  Shows the patient to be awake, alert, and oriented. There are no  focal sensorimotor signs. MUSCULOSKELETAL:  Shows evidence of degenerative joint disease changes. LABORATORY DATA:  As above mentioned. IMPRESSION:  A 80-year-old white female in apparently good health, on  Xarelto, presents with 3-day history of painless hematochezia, rule out  lower GI bleeding source most likely diverticular bleed, however, other  etiologies cannot be ruled out. RECOMMENDATIONS:  1. Agree with present management with IV fluids. 2.  Monitor the patient's serial H and H and transfuse on a p.r.n. basis  to keep hemoglobin above 7 gm percent. 3.  We will proceed with colonoscopy in a.m.  4.  The case and plan have been discussed in detail with the patient.         Breanna Gomez MD    D: 01/08/2021 10:45:17       T: 01/08/2021 11:28:26     [id number]  Job#: [id number]     Doc#: [id number]    CC:  Montse Modi DO

## 2021-01-08 NOTE — PROGRESS NOTES
Regular rate without appreciable murmurs, rubs, or gallops. No JVD or carotid bruits. Peripheral pulses equal bilaterally and palpable. No peripheral edema. GI Abdomen is soft without significant tenderness, masses, or guarding. Bowel sounds are normoactive. Rectal exam deferred. MSK No gross joint deformities. SKIN Normal coloration, warm, dry. NEURO Cranial nerves appear grossly intact, normal speech, no lateralizing weakness. PSYCH Awake, alert, oriented x 4. Affect appropriate.     Medications:   Medications:    polyethylene glycol  4,000 mL Oral Once    magnesium sulfate  2 g Intravenous Once    sodium chloride flush  10 mL Intravenous 2 times per day    pantoprazole  40 mg Intravenous Q12H    And    sodium chloride (PF)  10 mL Intravenous Q12H    pravastatin  20 mg Oral Nightly    lisinopril  40 mg Oral Daily    donepezil  5 mg Oral Nightly    clotrimazole-betamethasone   Topical BID      Infusions:    sodium chloride 75 mL/hr at 01/07/21 1955     PRN Meds:     sodium chloride flush, 10 mL, PRN      promethazine, 12.5 mg, Q6H PRN    Or      ondansetron, 4 mg, Q6H PRN      acetaminophen, 650 mg, Q6H PRN    Or      acetaminophen, 650 mg, Q6H PRN        Recent Labs     01/07/21  1030 01/08/21  0149 01/08/21  1036   WBC 7.2  --  5.9   HGB 11.8* 10.0* 10.7*   HCT 38.7 32.5* 35.1*     --  190      Recent Labs     01/07/21  1030 01/08/21  1036    141   K 3.9 4.2    109   CO2 24 26   BUN 16 10   CREATININE 1.0 1.0     Recent Labs     01/07/21  1030 01/08/21  1036   AST 22 17   ALT 14 11   BILITOT 0.6 0.5   ALKPHOS 75 68     Recent Labs     01/07/21  1030 01/08/21  1036   INR 2.42 1.25     Imaging reviewed    Electronically signed by Myles Murray MD on 1/8/2021 at 2:06 PM

## 2021-01-08 NOTE — PROGRESS NOTES
Physical Therapy    Facility/Department: [facility] 4N  Initial Assessment    NAME: Lisa Alvarado  : 1927  MRN: [id number]    Date of Service: 2021    Discharge Recommendations:  IP Rehab        Assessment   Assessment: Pt is a 80 y.o. female with admission for rectal bleeding. Prior to admission, pt was independent with functional mobility and ADLs. Examination of body systems reveals decreased strength, decreased balance, decreased aerobic capacity, and decreased independence with functional mobility. Prognosis: Good  Decision Making: High Complexity  Clinical Presentation: unpredictable characteristics  PT Education: Goals;PT Role;General Safety;Gait Training;Plan of Care; Functional Mobility Training;Equipment;Transfer Training  REQUIRES PT FOLLOW UP: Yes  Activity Tolerance  Activity Tolerance: Patient Tolerated treatment well       Restrictions  Restrictions/Precautions  Restrictions/Precautions: General Precautions, Fall Risk  Vision/Hearing  Vision: Within Functional Limits  Hearing: Exceptions to [facility]  Hearing Exceptions: Bilateral hearing aid     Subjective  General  Chart Reviewed: Yes  Patient assessed for rehabilitation services?: Yes  Family / Caregiver Present: No  Follows Commands: Within Functional Limits  Pain Screening  Patient Currently in Pain: Denies  Vital Signs  Patient Currently in Pain: Denies       Orientation  Orientation  Overall Orientation Status: Within Normal Limits  Social/Functional History  Social/Functional History  Lives With: Alone  Type of Home: Apartment(independent living)  Home Layout: One level  Home Access: Level entry  Bathroom Shower/Tub: Walk-in shower  Bathroom Toilet: Handicap height  Bathroom Equipment: Shower chair, Grab bars in shower, Grab bars around toilet  ADL Assistance: Independent  Homemaking Assistance: Independent  Ambulation Assistance: Independent  Transfer Assistance: Independent  Cognition   Cognition  Overall Cognitive Status: Exceptions  Arousal/Alertness: Appropriate responses to stimuli  Following Commands: Follows all commands without difficulty  Attention Span: Appears intact  Safety Judgement: Decreased awareness of need for safety;Decreased awareness of need for assistance  Problem Solving: Decreased awareness of errors  Insights: Decreased awareness of deficits  Initiation: Requires cues for some  Sequencing: Requires cues for some    Objective             Strength RLE  Comment: knee extension: at least 3+/5 observed functionally  Strength LLE  Comment: knee extension: at least 3+/5 observed functionally     Sensation  Overall Sensation Status: WNL     Transfers  Sit to Stand: Minimal Assistance;Contact guard assistance  Stand to sit: Minimal Assistance(with verbal cues to feel chair/toilet against back of legs, reach back, and sit slowly)  Ambulation  Ambulation?: Yes  Ambulation 1  Surface: level tile  Device: No Device  Assistance: Minimal assistance; Moderate assistance  Quality of Gait: decreased gait speed, decreased step length bilaterally, intermittent deviated path toward walls in hallway, intermittent reaching for walls and handrail in hallway, unsteady  Distance: 30 feet + 25 feet + 8 feet  Comments: with verbal and tactile cues to maintain upright posture in order to avoid COM shifting outside of MISAEL; with verbal cues to practice normal arm swing; with verbal cues to ambulate in a straight path in order to avoid deviating toward walls in hallway; with verbal cues for directions in order to successfully navigate hallway and return to correct room     Balance  Posture: Fair(forward head, rounded shoulders)  Sitting - Static: Good  Sitting - Dynamic: Good  Standing - Static: Fair;-  Standing - Dynamic: Poor;+      Gait Training:  Cues were given for safety, sequence, device management, balance, posture, awareness, path. Therapeutic Activity Training:   Therapeutic activity training was instructed today.   Cues were given for safety, sequence, UE/LE placement, awareness, and balance. Activities performed today included bed mobility training, sup-sit, sit-stand. Increased time required for all task completion. Plan   Plan  Times per week: 3+  Current Treatment Recommendations: Strengthening, ROM, Balance Training, Functional Mobility Training, Transfer Training, ADL/Self-care Training, Gait Training, Patient/Caregiver Education & Training, IADL Training, Stair training, Equipment Evaluation, Education, & procurement, Pain Management, Neuromuscular Re-education, Home Exercise Program, Positioning, Endurance Training, Safety Education & Training  Safety Devices  Type of devices: All fall risk precautions in place, Left in chair, Call light within reach, Chair alarm in place, Nurse notified, Gait belt, Patient at risk for falls      AM-PAC Score  AM-PAC Inpatient Mobility Raw Score : 13 (01/08/21 1835)  AM-PAC Inpatient T-Scale Score : 36.74 (01/08/21 1835)  Mobility Inpatient CMS 0-100% Score: 64.91 (01/08/21 1835)  Mobility Inpatient CMS G-Code Modifier : CL (01/08/21 1835)          Goals  Long term goals  Time Frame for Long term goals :  In one week:  Long term goal 1: Pt will complete all bed mobility with supervision  Long term goal 2: Pt will complete sit <> stand transfers with supervision  Long term goal 3: Pt will ambulate 200 feet with SBAx1 with LRAD  Long term goal 4: Pt will independently complete 3 sets of 10 reps of BLE AROM exercises in available and allowed ROM       Time In: 1346  Time Out: 1430  Total Treatment Time: 54  Timed Code Treatment Minutes: Carlitos De La Cruz 429, PT, DPT  License #: [id number]

## 2021-01-09 ENCOUNTER — ANESTHESIA (OUTPATIENT)
Dept: ENDOSCOPY | Age: 86
DRG: 378 | End: 2021-01-09
Payer: MEDICARE

## 2021-01-09 VITALS — DIASTOLIC BLOOD PRESSURE: 103 MMHG | OXYGEN SATURATION: 100 % | SYSTOLIC BLOOD PRESSURE: 135 MMHG

## 2021-01-09 LAB
ALBUMIN SERPL-MCNC: 3.2 GM/DL (ref 3.4–5)
ALP BLD-CCNC: 68 IU/L (ref 40–129)
ALT SERPL-CCNC: 10 U/L (ref 10–40)
AMYLASE: 29 U/L (ref 25–115)
ANION GAP SERPL CALCULATED.3IONS-SCNC: 9 MMOL/L (ref 4–16)
APTT: 27.8 SECONDS (ref 25.1–37.1)
AST SERPL-CCNC: 16 IU/L (ref 15–37)
BASOPHILS ABSOLUTE: 0.1 K/CU MM
BASOPHILS RELATIVE PERCENT: 1 % (ref 0–1)
BILIRUB SERPL-MCNC: 0.5 MG/DL (ref 0–1)
BUN BLDV-MCNC: 7 MG/DL (ref 6–23)
CALCIUM SERPL-MCNC: 7.9 MG/DL (ref 8.3–10.6)
CHLORIDE BLD-SCNC: 108 MMOL/L (ref 99–110)
CO2: 24 MMOL/L (ref 21–32)
CREAT SERPL-MCNC: 0.9 MG/DL (ref 0.6–1.1)
DIFFERENTIAL TYPE: ABNORMAL
EOSINOPHILS ABSOLUTE: 0.3 K/CU MM
EOSINOPHILS RELATIVE PERCENT: 4.3 % (ref 0–3)
GFR AFRICAN AMERICAN: >60 ML/MIN/1.73M2
GFR NON-AFRICAN AMERICAN: 58 ML/MIN/1.73M2
GLUCOSE BLD-MCNC: 89 MG/DL (ref 70–99)
HCT VFR BLD CALC: 34.1 % (ref 37–47)
HCT VFR BLD CALC: 34.5 % (ref 37–47)
HCT VFR BLD CALC: 36.6 % (ref 37–47)
HEMOGLOBIN: 10.6 GM/DL (ref 12.5–16)
HEMOGLOBIN: 10.7 GM/DL (ref 12.5–16)
HEMOGLOBIN: 11.2 GM/DL (ref 12.5–16)
IMMATURE NEUTROPHIL %: 0.5 % (ref 0–0.43)
INR BLD: 1.05 INDEX
LIPASE: 24 IU/L (ref 13–60)
LYMPHOCYTES ABSOLUTE: 1.5 K/CU MM
LYMPHOCYTES RELATIVE PERCENT: 24.7 % (ref 24–44)
MCH RBC QN AUTO: 28.7 PG (ref 27–31)
MCHC RBC AUTO-ENTMCNC: 30.7 % (ref 32–36)
MCV RBC AUTO: 93.5 FL (ref 78–100)
MONOCYTES ABSOLUTE: 0.7 K/CU MM
MONOCYTES RELATIVE PERCENT: 11.2 % (ref 0–4)
NUCLEATED RBC %: 0 %
PDW BLD-RTO: 13.5 % (ref 11.7–14.9)
PLATELET # BLD: 190 K/CU MM (ref 140–440)
PMV BLD AUTO: 10.4 FL (ref 7.5–11.1)
POTASSIUM SERPL-SCNC: 3.8 MMOL/L (ref 3.5–5.1)
PROTHROMBIN TIME: 12.7 SECONDS (ref 11.7–14.5)
RBC # BLD: 3.69 M/CU MM (ref 4.2–5.4)
SEGMENTED NEUTROPHILS ABSOLUTE COUNT: 3.5 K/CU MM
SEGMENTED NEUTROPHILS RELATIVE PERCENT: 58.3 % (ref 36–66)
SODIUM BLD-SCNC: 141 MMOL/L (ref 135–145)
TOTAL IMMATURE NEUTOROPHIL: 0.03 K/CU MM
TOTAL NUCLEATED RBC: 0 K/CU MM
TOTAL PROTEIN: 5.3 GM/DL (ref 6.4–8.2)
WBC # BLD: 6 K/CU MM (ref 4–10.5)

## 2021-01-09 PROCEDURE — 82150 ASSAY OF AMYLASE: CPT

## 2021-01-09 PROCEDURE — 1200000000 HC SEMI PRIVATE

## 2021-01-09 PROCEDURE — 2709999900 HC NON-CHARGEABLE SUPPLY: Performed by: SPECIALIST

## 2021-01-09 PROCEDURE — 85018 HEMOGLOBIN: CPT

## 2021-01-09 PROCEDURE — 83690 ASSAY OF LIPASE: CPT

## 2021-01-09 PROCEDURE — 2500000003 HC RX 250 WO HCPCS: Performed by: NURSE ANESTHETIST, CERTIFIED REGISTERED

## 2021-01-09 PROCEDURE — 0DJD8ZZ INSPECTION OF LOWER INTESTINAL TRACT, VIA NATURAL OR ARTIFICIAL OPENING ENDOSCOPIC: ICD-10-PCS | Performed by: SPECIALIST

## 2021-01-09 PROCEDURE — 3609027000 HC COLONOSCOPY: Performed by: SPECIALIST

## 2021-01-09 PROCEDURE — 3700000000 HC ANESTHESIA ATTENDED CARE: Performed by: SPECIALIST

## 2021-01-09 PROCEDURE — 85025 COMPLETE CBC W/AUTO DIFF WBC: CPT

## 2021-01-09 PROCEDURE — 80053 COMPREHEN METABOLIC PANEL: CPT

## 2021-01-09 PROCEDURE — 97530 THERAPEUTIC ACTIVITIES: CPT

## 2021-01-09 PROCEDURE — 3700000001 HC ADD 15 MINUTES (ANESTHESIA): Performed by: SPECIALIST

## 2021-01-09 PROCEDURE — 6370000000 HC RX 637 (ALT 250 FOR IP): Performed by: SPECIALIST

## 2021-01-09 PROCEDURE — 85730 THROMBOPLASTIN TIME PARTIAL: CPT

## 2021-01-09 PROCEDURE — 6360000002 HC RX W HCPCS: Performed by: NURSE ANESTHETIST, CERTIFIED REGISTERED

## 2021-01-09 PROCEDURE — 85014 HEMATOCRIT: CPT

## 2021-01-09 PROCEDURE — 6370000000 HC RX 637 (ALT 250 FOR IP): Performed by: NURSE PRACTITIONER

## 2021-01-09 PROCEDURE — 85610 PROTHROMBIN TIME: CPT

## 2021-01-09 PROCEDURE — 97116 GAIT TRAINING THERAPY: CPT

## 2021-01-09 PROCEDURE — 36415 COLL VENOUS BLD VENIPUNCTURE: CPT

## 2021-01-09 RX ORDER — SODIUM CHLORIDE 9 MG/ML
INJECTION, SOLUTION INTRAVENOUS CONTINUOUS PRN
Status: DISCONTINUED | OUTPATIENT
Start: 2021-01-09 | End: 2021-01-09 | Stop reason: SDUPTHER

## 2021-01-09 RX ORDER — LIDOCAINE HYDROCHLORIDE 20 MG/ML
INJECTION, SOLUTION INFILTRATION; PERINEURAL PRN
Status: DISCONTINUED | OUTPATIENT
Start: 2021-01-09 | End: 2021-01-09 | Stop reason: SDUPTHER

## 2021-01-09 RX ORDER — SIMETHICONE 20 MG/.3ML
EMULSION ORAL PRN
Status: DISCONTINUED | OUTPATIENT
Start: 2021-01-09 | End: 2021-01-09 | Stop reason: ALTCHOICE

## 2021-01-09 RX ORDER — PROPOFOL 10 MG/ML
INJECTION, EMULSION INTRAVENOUS PRN
Status: DISCONTINUED | OUTPATIENT
Start: 2021-01-09 | End: 2021-01-09 | Stop reason: SDUPTHER

## 2021-01-09 RX ADMIN — DONEPEZIL HYDROCHLORIDE 5 MG: 5 TABLET, FILM COATED ORAL at 22:34

## 2021-01-09 RX ADMIN — SODIUM CHLORIDE: 9 INJECTION, SOLUTION INTRAVENOUS at 08:27

## 2021-01-09 RX ADMIN — PRAVASTATIN SODIUM 20 MG: 10 TABLET ORAL at 22:34

## 2021-01-09 RX ADMIN — CLOTRIMAZOLE AND BETAMETHASONE DIPROPIONATE: 10; .5 CREAM TOPICAL at 22:33

## 2021-01-09 RX ADMIN — LIDOCAINE HYDROCHLORIDE 100 MG: 20 INJECTION, SOLUTION INFILTRATION; PERINEURAL at 08:41

## 2021-01-09 RX ADMIN — PROPOFOL 220 MG: 10 INJECTION, EMULSION INTRAVENOUS at 08:41

## 2021-01-09 NOTE — PROGRESS NOTES
INR 2.42 1.25 1.05     Imaging reviewed    Electronically signed by Laura Blancas MD on 1/9/2021 at 1:58 PM

## 2021-01-09 NOTE — ANESTHESIA POSTPROCEDURE EVALUATION
Department of Anesthesiology  Postprocedure Note    Patient: Kaleb Forman  MRN: [id number]  YOB: 1927  Date of evaluation: 1/9/2021  Time:  9:25 AM     Procedure Summary     Date: 01/09/21 Room / Location: [address]    Anesthesia Start: 4296 Anesthesia Stop: 3861    Procedure: COLONOSCOPY DIAGNOSTIC (N/A ) Diagnosis: (-)    Surgeons: Adam Hanson MD Responsible Provider: Suzanne Mckeon MD    Anesthesia Type: MAC ASA Status: 3          Anesthesia Type: MAC    Karla Phase I:  10    Karla Phase II:  10    Last vitals: Reviewed and per EMR flowsheets.        Anesthesia Post Evaluation    Patient location during evaluation: bedside  Patient participation: complete - patient participated  Level of consciousness: awake and alert  Pain score: 0  Airway patency: patent  Nausea & Vomiting: no nausea and no vomiting  Complications: no  Cardiovascular status: hemodynamically stable  Respiratory status: acceptable, room air, spontaneous ventilation and nonlabored ventilation  Hydration status: euvolemic

## 2021-01-09 NOTE — PROGRESS NOTES
Physical Therapy    Physical Therapy Treatment Note  Name: Andrés Turner MRN: [id number] :   1927   Date:  2021   Admission Date: 2021 Room:  [address] [place] [address]   Restrictions/Precautions:  Restrictions/Precautions  Restrictions/Precautions: General Precautions, Fall Risk         Subjective:  Patient states:  \"I could take a walk\"  Pain:   Location, Type, Intensity (0/10 to 10/10):  denies    Objective:    Observation:  Pt sitting upright in chair upon entry    Treatment, including education/measures:  Pt agreeable to participating in therapy at this time. Therapeutic Activity Training:   Therapeutic activity training was instructed today. Cues were given for safety, sequence, UE/LE placement, awareness, and balance. Activities performed today included bed mobility training, sup-sit, sit-stand. Pt completed sit to stand from chair with CGAx1 with verbal cues to push through chair and avoid pulling on walker. Pt completed stand to sit onto chair with minAx1 with verbal cues to feel chair against back of legs, reach back, and sit slowly. Gait Training:  Cues were given for safety, sequence, device management, balance, posture, awareness, path. Pt ambulated 40 feet + 40 feet with assist varying from CGAx1 to minAx1 with a front wheeled walker with a decreased gait speed, a decreased step length bilaterally, and an intermittently unsteady gait. Pt provided with verbal and tactile cues for BLE placement, walker placement, and sequence throughout ambulation. Pt provided with verbal and tactile cues to maintain upright posture in order to avoid COM shifting outside of MISAEL. Pt provided with verbal cues for directions in order to successfully navigate hallway and return to correct room. Safety  Patient left safely in the chair, with call light/phone in reach with alarm applied. Gait belt and mask were used for transfers and gait.         Assessment / Impression:       Patient's tolerance of treatment:  Good; patient tolerated ambulation in hallway today   Adverse Reaction: none  Significant change in status and impact:  none  Barriers to improvement:  Generalized weakness      Plan for Next Session:    Continue progressing toward goals per plan of care. Progress independence with transfers and ambulation as tolerated and appropriate. Progress ambulation distance as tolerated and appropriate. Time in:  1214  Time out:  1253  Timed treatment minutes:  39  Total treatment time:  44    Previously filed items:  Social/Functional History  Lives With: Alone  Type of Home: Apartment(independent living)  Home Layout: One level  Home Access: Level entry  Bathroom Shower/Tub: Walk-in shower  Bathroom Toilet: Handicap height  Bathroom Equipment: Shower chair, Grab bars in shower, Grab bars around toilet  ADL Assistance: Independent  Homemaking Assistance: Independent  Ambulation Assistance: Independent  Transfer Assistance: [address] term goals  Time Frame for Long term goals :  In one week:  Long term goal 1: Pt will complete all bed mobility with supervision  Long term goal 2: Pt will complete sit <> stand transfers with supervision  Long term goal 3: Pt will ambulate 200 feet with SBAx1 with LRAD  Long term goal 4: Pt will independently complete 3 sets of 10 reps of BLE AROM exercises in available and allowed ROM    Electronically signed by:      Beth Arreaga PT, DPT  License #: [id number]

## 2021-01-09 NOTE — PROGRESS NOTES
Patient starting to weaken out and get very tired, patients stool is all watery and bloody.   Called dr Umesh Rodriguez and he stated it was ok to stop the golytely, and let patient rest.  She did consume alittle over half the bottle

## 2021-01-09 NOTE — BRIEF OP NOTE
Brief Postoperative Note      Yolanda Toure is a 80 y.o. female     Pre-operative Diagnosis:HEMATOCHEZIA    Post-operative Diagnosis: HDS  / D.COLI    Procedure: COLONOSCOPY    Anesthesia: MAC    Surgeons/Assistants:Maria Fernanda Mckinley     Estimated Blood Loss: NONE    Complications: None    Specimens: were not obtained  REC  CPM F/U H/H    Maria Fernanda Mckinley   1/9/2021   9:21 AM

## 2021-01-10 LAB
ALBUMIN SERPL-MCNC: 3.6 GM/DL (ref 3.4–5)
ALP BLD-CCNC: 76 IU/L (ref 40–129)
ALT SERPL-CCNC: 10 U/L (ref 10–40)
ANION GAP SERPL CALCULATED.3IONS-SCNC: 9 MMOL/L (ref 4–16)
AST SERPL-CCNC: 14 IU/L (ref 15–37)
BASOPHILS ABSOLUTE: 0 K/CU MM
BASOPHILS RELATIVE PERCENT: 0.7 % (ref 0–1)
BILIRUB SERPL-MCNC: 0.5 MG/DL (ref 0–1)
BUN BLDV-MCNC: 5 MG/DL (ref 6–23)
CALCIUM SERPL-MCNC: 8.5 MG/DL (ref 8.3–10.6)
CHLORIDE BLD-SCNC: 107 MMOL/L (ref 99–110)
CO2: 25 MMOL/L (ref 21–32)
CREAT SERPL-MCNC: 0.9 MG/DL (ref 0.6–1.1)
DIFFERENTIAL TYPE: ABNORMAL
EOSINOPHILS ABSOLUTE: 0.2 K/CU MM
EOSINOPHILS RELATIVE PERCENT: 3.8 % (ref 0–3)
GFR AFRICAN AMERICAN: >60 ML/MIN/1.73M2
GFR NON-AFRICAN AMERICAN: 58 ML/MIN/1.73M2
GLUCOSE BLD-MCNC: 105 MG/DL (ref 70–99)
HCT VFR BLD CALC: 36.8 % (ref 37–47)
HEMOGLOBIN: 11.5 GM/DL (ref 12.5–16)
IMMATURE NEUTROPHIL %: 0.5 % (ref 0–0.43)
LYMPHOCYTES ABSOLUTE: 1.1 K/CU MM
LYMPHOCYTES RELATIVE PERCENT: 18.6 % (ref 24–44)
MCH RBC QN AUTO: 28.8 PG (ref 27–31)
MCHC RBC AUTO-ENTMCNC: 31.3 % (ref 32–36)
MCV RBC AUTO: 92 FL (ref 78–100)
MONOCYTES ABSOLUTE: 0.5 K/CU MM
MONOCYTES RELATIVE PERCENT: 9 % (ref 0–4)
NUCLEATED RBC %: 0 %
PDW BLD-RTO: 13.2 % (ref 11.7–14.9)
PLATELET # BLD: 189 K/CU MM (ref 140–440)
PMV BLD AUTO: 10.8 FL (ref 7.5–11.1)
POTASSIUM SERPL-SCNC: 3.8 MMOL/L (ref 3.5–5.1)
RBC # BLD: 4 M/CU MM (ref 4.2–5.4)
SEGMENTED NEUTROPHILS ABSOLUTE COUNT: 3.9 K/CU MM
SEGMENTED NEUTROPHILS RELATIVE PERCENT: 67.4 % (ref 36–66)
SODIUM BLD-SCNC: 141 MMOL/L (ref 135–145)
TOTAL IMMATURE NEUTOROPHIL: 0.03 K/CU MM
TOTAL NUCLEATED RBC: 0 K/CU MM
TOTAL PROTEIN: 6 GM/DL (ref 6.4–8.2)
WBC # BLD: 5.8 K/CU MM (ref 4–10.5)

## 2021-01-10 PROCEDURE — 85014 HEMATOCRIT: CPT

## 2021-01-10 PROCEDURE — 36415 COLL VENOUS BLD VENIPUNCTURE: CPT

## 2021-01-10 PROCEDURE — 94761 N-INVAS EAR/PLS OXIMETRY MLT: CPT

## 2021-01-10 PROCEDURE — 6370000000 HC RX 637 (ALT 250 FOR IP): Performed by: NURSE PRACTITIONER

## 2021-01-10 PROCEDURE — 85025 COMPLETE CBC W/AUTO DIFF WBC: CPT

## 2021-01-10 PROCEDURE — 80053 COMPREHEN METABOLIC PANEL: CPT

## 2021-01-10 PROCEDURE — C9113 INJ PANTOPRAZOLE SODIUM, VIA: HCPCS | Performed by: NURSE PRACTITIONER

## 2021-01-10 PROCEDURE — 6370000000 HC RX 637 (ALT 250 FOR IP): Performed by: INTERNAL MEDICINE

## 2021-01-10 PROCEDURE — 76937 US GUIDE VASCULAR ACCESS: CPT

## 2021-01-10 PROCEDURE — 6360000002 HC RX W HCPCS: Performed by: NURSE PRACTITIONER

## 2021-01-10 PROCEDURE — 2580000003 HC RX 258: Performed by: NURSE PRACTITIONER

## 2021-01-10 PROCEDURE — 1200000000 HC SEMI PRIVATE

## 2021-01-10 PROCEDURE — 85018 HEMOGLOBIN: CPT

## 2021-01-10 RX ADMIN — SODIUM CHLORIDE, PRESERVATIVE FREE 10 ML: 5 INJECTION INTRAVENOUS at 17:00

## 2021-01-10 RX ADMIN — RIVAROXABAN 20 MG: 20 TABLET, FILM COATED ORAL at 16:59

## 2021-01-10 RX ADMIN — LISINOPRIL 40 MG: 40 TABLET ORAL at 09:42

## 2021-01-10 RX ADMIN — PANTOPRAZOLE SODIUM 40 MG: 40 INJECTION, POWDER, FOR SOLUTION INTRAVENOUS at 16:59

## 2021-01-10 ASSESSMENT — PAIN SCALES - GENERAL: PAINLEVEL_OUTOF10: 0

## 2021-01-10 NOTE — CONSULTS
Consult for placement of PIV completed. 20 G 1 IN PIV placed via U/S to left forearm. Brisk blood return noted and flushes easily. Pt tolerated well.

## 2021-01-10 NOTE — PROGRESS NOTES
Hospitalist Progress Note      Name:  Paige Zendejas /Age/Sex: 1927  (80 y.o. female)   MRN & CSN:  [id number] & [id number] Admission Date/Time: 2021 10:01 AM   Location:  [address] [place] [address] PCP: Rajan Cowan MD       Hospital Day: 4    Assessment and Plan:   Paige Zendejas is a 80 y.o.  female  who presents with Rectal bleeding    Bright red blood per rectum:  · Continue pantoprazole, H&H. Continue IV fluid. · Hemoglobin stable. .  Will transfuse for hemoglobin less than 7.  · Hold DVT prophylaxis for now. · Status post colonoscopy with diverticulosis. · GI following. Maculopapular rash right lower extremity: Continue steroid. Hypertension: Blood pressure controlled. Continue medications. Chronic deep vein thrombosis: We will start anticoagulation today. Discussed with GI.  Mild cognitive impairment: On Aricept. Diet DIET LOW FIBER;   DVT Prophylaxis [] Lovenox, []  Heparin, [x] SCDs, [] Warfarin  [] NOAC     GI Prophylaxis [x] PPI,  [] H2 Blocker,  [] Carafate,  [] Diet/Tube Feeds   Code Status Full Code   MDM [] Low, [x] Moderate,[]  High     History of Present Illness:     Chief Complaint: Rectal bleeding    She was seen and examined today. Complaining of not being able to get enough sleep last night. No nausea or vomiting. No abdominal pain. No rectal bleeding. No fever or chills. Ten point ROS reviewed negative, unless as noted above    Objective:     No intake or output data in the 24 hours ending 01/10/21 1330   Vitals:   Vitals:    01/10/21 1235   BP:    Pulse:    Resp:    Temp:    SpO2: 94%     Physical Exam:   GEN Awake female, sitting upright in bed in no apparent distress. Appears given age. EYES Pupils are equally round. No scleral erythema, discharge, or conjunctivitis. HENT Mucous membranes are moist. Oral pharynx without exudates, no evidence of thrush. NECK Supple, no apparent thyromegaly or masses. RESP Clear to auscultation, no wheezes, rales or rhonchi. Symmetric chest movement while on room air. CARDIO/VASC S1/S2 auscultated. Regular rate without appreciable murmurs, rubs, or gallops. No JVD or carotid bruits. Peripheral pulses equal bilaterally and palpable. No peripheral edema. GI Abdomen is soft without significant tenderness, masses, or guarding. Bowel sounds are normoactive. Rectal exam deferred. MSK No gross joint deformities. SKIN Normal coloration, warm, dry. NEURO Cranial nerves appear grossly intact, normal speech, no lateralizing weakness. PSYCH Awake, alert, oriented x 4. Affect appropriate. Medications:   Medications:    sodium chloride flush  10 mL Intravenous 2 times per day    pantoprazole  40 mg Intravenous Q12H    And    sodium chloride (PF)  10 mL Intravenous Q12H    pravastatin  20 mg Oral Nightly    lisinopril  40 mg Oral Daily    donepezil  5 mg Oral Nightly    clotrimazole-betamethasone   Topical BID      Infusions:     PRN Meds:     sodium chloride flush, 10 mL, PRN      promethazine, 12.5 mg, Q6H PRN    Or      ondansetron, 4 mg, Q6H PRN      acetaminophen, 650 mg, Q6H PRN    Or      acetaminophen, 650 mg, Q6H PRN        Recent Labs     01/08/21  1036 01/08/21  1036 01/09/21  0132 01/09/21  0800 01/09/21  2133 01/10/21  0912   WBC 5.9  --  6.0  --   --  5.8   HGB 10.7*   < > 10.6* 10.7* 11.2* 11.5*   HCT 35.1*   < > 34.5* 34.1* 36.6* 36.8*     --  190  --   --  189    < > = values in this interval not displayed.       Recent Labs     01/08/21  1036 01/09/21  0132 01/10/21  0912    141 141   K 4.2 3.8 3.8    108 107   CO2 26 24 25   BUN 10 7 5*   CREATININE 1.0 0.9 0.9     Recent Labs     01/08/21  1036 01/09/21  0132 01/10/21  0912   AST 17 16 14*   ALT 11 10 10   BILITOT 0.5 0.5 0.5   ALKPHOS 68 68 76     Recent Labs     01/08/21  1036 01/09/21  0132   INR 1.25 1.05     Imaging reviewed    Electronically signed by Mack Lomeli MD on 1/10/2021 at 1:30 PM

## 2021-01-11 LAB
BACTERIA: NEGATIVE /HPF
BILIRUBIN URINE: NEGATIVE MG/DL
BLOOD, URINE: NEGATIVE
CLARITY: CLEAR
COLOR: ABNORMAL
GLUCOSE, URINE: NEGATIVE MG/DL
KETONES, URINE: ABNORMAL MG/DL
LEUKOCYTE ESTERASE, URINE: NEGATIVE
NITRITE URINE, QUANTITATIVE: NEGATIVE
PH, URINE: 7 (ref 5–8)
PROTEIN UA: NEGATIVE MG/DL
RBC URINE: <1 /HPF (ref 0–6)
SPECIFIC GRAVITY UA: 1.01 (ref 1–1.03)
TRICHOMONAS: ABNORMAL /HPF
UROBILINOGEN, URINE: NORMAL MG/DL (ref 0.2–1)
WBC UA: 1 /HPF (ref 0–5)

## 2021-01-11 PROCEDURE — 97535 SELF CARE MNGMENT TRAINING: CPT

## 2021-01-11 PROCEDURE — C9113 INJ PANTOPRAZOLE SODIUM, VIA: HCPCS | Performed by: NURSE PRACTITIONER

## 2021-01-11 PROCEDURE — 6370000000 HC RX 637 (ALT 250 FOR IP): Performed by: INTERNAL MEDICINE

## 2021-01-11 PROCEDURE — 6360000002 HC RX W HCPCS: Performed by: NURSE PRACTITIONER

## 2021-01-11 PROCEDURE — 1200000000 HC SEMI PRIVATE

## 2021-01-11 PROCEDURE — 81001 URINALYSIS AUTO W/SCOPE: CPT

## 2021-01-11 PROCEDURE — 2580000003 HC RX 258: Performed by: INTERNAL MEDICINE

## 2021-01-11 PROCEDURE — 97166 OT EVAL MOD COMPLEX 45 MIN: CPT

## 2021-01-11 PROCEDURE — 6370000000 HC RX 637 (ALT 250 FOR IP): Performed by: NURSE PRACTITIONER

## 2021-01-11 PROCEDURE — 85027 COMPLETE CBC AUTOMATED: CPT

## 2021-01-11 PROCEDURE — 2580000003 HC RX 258: Performed by: NURSE PRACTITIONER

## 2021-01-11 PROCEDURE — 94761 N-INVAS EAR/PLS OXIMETRY MLT: CPT

## 2021-01-11 RX ORDER — 0.9 % SODIUM CHLORIDE 0.9 %
250 INTRAVENOUS SOLUTION INTRAVENOUS ONCE
Status: COMPLETED | OUTPATIENT
Start: 2021-01-11 | End: 2021-01-11

## 2021-01-11 RX ADMIN — PRAVASTATIN SODIUM 20 MG: 10 TABLET ORAL at 21:54

## 2021-01-11 RX ADMIN — RIVAROXABAN 20 MG: 20 TABLET, FILM COATED ORAL at 18:25

## 2021-01-11 RX ADMIN — CLOTRIMAZOLE AND BETAMETHASONE DIPROPIONATE: 10; .5 CREAM TOPICAL at 09:01

## 2021-01-11 RX ADMIN — SODIUM CHLORIDE, PRESERVATIVE FREE 10 ML: 5 INJECTION INTRAVENOUS at 09:06

## 2021-01-11 RX ADMIN — SODIUM CHLORIDE 250 ML: 9 INJECTION, SOLUTION INTRAVENOUS at 14:54

## 2021-01-11 RX ADMIN — SODIUM CHLORIDE, PRESERVATIVE FREE 10 ML: 5 INJECTION INTRAVENOUS at 17:04

## 2021-01-11 RX ADMIN — PANTOPRAZOLE SODIUM 40 MG: 40 INJECTION, POWDER, FOR SOLUTION INTRAVENOUS at 17:04

## 2021-01-11 RX ADMIN — SODIUM CHLORIDE, PRESERVATIVE FREE 10 ML: 5 INJECTION INTRAVENOUS at 21:55

## 2021-01-11 RX ADMIN — DONEPEZIL HYDROCHLORIDE 5 MG: 5 TABLET, FILM COATED ORAL at 21:54

## 2021-01-11 RX ADMIN — CLOTRIMAZOLE AND BETAMETHASONE DIPROPIONATE: 10; .5 CREAM TOPICAL at 21:56

## 2021-01-11 RX ADMIN — LISINOPRIL 40 MG: 40 TABLET ORAL at 09:00

## 2021-01-11 ASSESSMENT — PAIN SCALES - GENERAL
PAINLEVEL_OUTOF10: 0
PAINLEVEL_OUTOF10: 0

## 2021-01-11 NOTE — PROGRESS NOTES
Hospitalist Progress Note      Name:  Magaly Gupta /Age/Sex: 1927  (80 y.o. female)   MRN & CSN:  [id number] & [id number] Admission Date/Time: 2021 10:01 AM   Location:  [address] [place] [address] PCP: Sathya Travis MD       Hospital Day: 5    Assessment and Plan:   Magaly Gupta is a 80 y.o.  female  who presents with Rectal bleeding    Bright red blood per rectum: Resolved  · Continue pantoprazole. Given IV fluid now discontinued. · Hemoglobin stable. .  Will transfuse for hemoglobin less than 7.  · Rivaroxaban restarted yesterday. · Status post colonoscopy with diverticulosis. · GI following. Maculopapular rash right lower extremity: Continue steroid. Hypertension: Blood pressure controlled. Continue medications. Chronic deep vein thrombosis: Anticoagulation restarted. Mild cognitive impairment: On Aricept. Confused overnight until this morning. Discussed with POA. I am concerned that with her confusion today, she may have some accidents at the independent facility like falling. Advised that the patient was evaluated by physical therapy and Occupational Therapy and both recommended placement. This has been discussed with the patient when she was not confused and she declined. He will talk to the patient about it. Diet DIET LOW FIBER;   DVT Prophylaxis [] Lovenox, []  Heparin, [x] SCDs, [] Warfarin  [] NOAC     GI Prophylaxis [x] PPI,  [] H2 Blocker,  [] Carafate,  [] Diet/Tube Feeds   Code Status Full Code   MDM [] Low, [x] Moderate,[]  High     History of Present Illness:     Chief Complaint: Rectal bleeding    She was seen and examined today. Confused this morning. She thought that she is not in the hospital.  She has been complaining about the staff. Ten point ROS reviewed negative, unless as noted above    Objective:        Intake/Output Summary (Last 24 hours) at 2021 1241  Last data filed at 2021 0906  Gross per 24 hour   Intake 10 ml   Output --   Net 10 ml Vitals:   Vitals:    01/11/21 0759   BP: (!) 195/84   Pulse: 99   Resp: 16   Temp: 98.4 °F (36.9 °C)   SpO2: 95%     Physical Exam:   GEN Awake female, not in distress. Confused. EYES Pupils are equally round. No scleral erythema, discharge, or conjunctivitis. HENT Mucous membranes are moist. Oral pharynx without exudates, no evidence of thrush. NECK Supple, no apparent thyromegaly or masses. RESP Clear to auscultation, no wheezes, rales or rhonchi. Symmetric chest movement while on room air. CARDIO/VASC S1/S2 auscultated. Regular rate without appreciable murmurs, rubs, or gallops. No JVD or carotid bruits. Peripheral pulses equal bilaterally and palpable. No peripheral edema. GI Abdomen is soft without significant tenderness, masses, or guarding. Bowel sounds are normoactive. Rectal exam deferred. MSK No gross joint deformities. SKIN Normal coloration, warm, dry.     Medications:   Medications:    rivaroxaban  20 mg Oral Daily    sodium chloride flush  10 mL Intravenous 2 times per day    pantoprazole  40 mg Intravenous Q12H    And    sodium chloride (PF)  10 mL Intravenous Q12H    pravastatin  20 mg Oral Nightly    lisinopril  40 mg Oral Daily    donepezil  5 mg Oral Nightly    clotrimazole-betamethasone   Topical BID      Infusions:     PRN Meds:     sodium chloride flush, 10 mL, PRN      promethazine, 12.5 mg, Q6H PRN    Or      ondansetron, 4 mg, Q6H PRN      acetaminophen, 650 mg, Q6H PRN    Or      acetaminophen, 650 mg, Q6H PRN        Recent Labs     01/09/21 0132 01/09/21  0800 01/09/21  2133 01/10/21  0912   WBC 6.0  --   --  5.8   HGB 10.6* 10.7* 11.2* 11.5*   HCT 34.5* 34.1* 36.6* 36.8*     --   --  189      Recent Labs     01/09/21  0132 01/10/21  0912    141   K 3.8 3.8    107   CO2 24 25   BUN 7 5*   CREATININE 0.9 0.9     Recent Labs     01/09/21  0132 01/10/21  0912   AST 16 14*   ALT 10 10   BILITOT 0.5 0.5   ALKPHOS 68 76     Recent Labs 01/09/21  0132   INR 1.05     Imaging reviewed    Electronically signed by Shay Aguilar MD on 1/11/2021 at 12:41 PM

## 2021-01-11 NOTE — PROGRESS NOTES
Occupational Therapy  [facility] OCCUPATIONAL THERAPY EVALUATION    History  [place]:  The primary encounter diagnosis was Rectal bleeding. A diagnosis of Anticoagulated was also pertinent to this visit. Restrictions:  Restrictions/Precautions  Restrictions/Precautions: General Precautions, Fall Risk                        Communication with other providers: RN, PT    Subjective:  Patient states:  \"I need to go the bathroom! \"  Pain:  none  Patient goal:  Not able to state    Occupational profile (relevant social history and personal factors):    Social/Functional History  Lives With: Alone  Type of Home: Apartment(independent living)  Home Layout: One level  Home Access: Level entry  Bathroom Shower/Tub: Walk-in shower  Bathroom Toilet: Handicap height  Bathroom Equipment: Shower chair, Grab bars in shower, Grab bars around toilet  ADL Assistance: [address]: Independent  Transfer Assistance: Independent    Examination of body systems (includes body structures/functions, activity/participation limitations):  · Orientation: ox self only  · Cognition:  Generally confused, paranoid. Seems to be acute. Follows commands and provides history appropriately. · Observation:  Received pt in bed. Alert and cooperative. Asking to go to bathroom. Perseverating on an event in wee hours of morning related to sitting in the chair. · Vision:  [facility]   · Hearing:  WFL   · ROM:  WFL BUE  · Strength: WFL BUE  · Sensation: not tested     ADLs  Feeding: ORIN    Grooming: ORIN sinkside hand wash post toileting    Dressing: UB SBA in seated LB SBA    Bathing: UB SBA in seated LB SBA    Toileting: SBA    *Some ADL determined per observation of actual ADL performance, functional mobility, balance, activity tolerance, and cognition.      AM-PAC 6 click short form for inpatient daily activity:  Raw Score: 20  24/24 = unimpaired  23/24 = 1-20% impaired   20/24-22/24 = 21-40% impaired  15/24-19/24 = 41-59% impaired   10/24-14/24 = 60%-79% impaired  7/24-9/24 = 80%-99% impaired  6/24 = 100% impaired    Functional Mobility  Bed mobility:   Supine to sit: ORIN  Sit to supine: SBA    Sitting balance: SBA    Transfers:   Sit to stand: CGA for steadying from EOB and low commode  Stand to sit: CGA to low commode and EOB    Standing balance: CGA during ADL, sinkisde at standing at toilet    Ambulation:  CGA c RW to/from bathroom. Unsafe with RW, needs cues for walker safety    Activity tolerance  WFL    Assessment:  Assessment  Performance deficits / Impairments: Decreased cognition, Decreased safe awareness, Decreased high-level IADLs, Decreased balance, Decreased ADL status, Decreased functional mobility   Treatment Diagnosis: anemia, gi bleed, confusion  Prognosis: Good  Decision Making: Medium Complexity  REQUIRES OT FOLLOW UP: Yes  Discharge Recommendations: Subacute/Skilled Nursing Facility          Goals:  By d/c or goals met:     Pt will perform all bed mobility with ORIN in prep for EOB/OOB activity. Pt will perform all functional transfers with SBA and appropriate use of LRD to bed, toilet, chair in prep for increased functional independence. Pt will perform UB ADLs with SBA to increase functional independence. Pt will perform LB ADLs with SBA to increase functional independence. Pt will perform all aspects of toileting with SBA to increase functional independence. Pt will participate in therex/therax c emphasis on strength, activity tolerance,  safety, ORIN tasks. Plan:         Recommendation for activity with nursing staff:  ambulate to bathroom with RW for toileting    Treatment today:    Self Care Training:   Self care training was performed today. Cues were given for safety, sequence, UE/LE placement, visual cues, and balance. Activities performed today included dressing, toileting, hand hygiene, and grooming.   Education: Role of OT, OT POC, d/c needs, home safety    Safety: Left in bed with all needs in reach. bed alarm applied. Gait belt used for transfer and mobility. Time in:  0920  Time out:  0940  Timed treatment minutes:  8  Total treatment time:  20    Electronically signed by:    [address]anco, OTR/L, [place]   [id number]   11:26 AM, 1/11/2021

## 2021-01-11 NOTE — CARE COORDINATION
Reviewed therapy and clinicals. Patients primary insurance is [employer]. Patient presenting too high level for ARU. Notified Virginie BLACK

## 2021-01-11 NOTE — FLOWSHEET NOTE
Rodrigo Madsen called to check on patient status and plans for discharge at patient request.  Patient gave this RN verbal consent to speak with this family member. Nephew was updated on current confusion and lack of cooperation. Nephew states she does get confused at home and stated this is the first time she has ever been hospitalized. Nephew also reported if there is a change in home routine, she can get very frustrated and angry.

## 2021-01-12 VITALS
SYSTOLIC BLOOD PRESSURE: 149 MMHG | TEMPERATURE: 98.1 F | RESPIRATION RATE: 16 BRPM | HEIGHT: 60 IN | WEIGHT: 138.9 LBS | DIASTOLIC BLOOD PRESSURE: 80 MMHG | HEART RATE: 82 BPM | BODY MASS INDEX: 27.27 KG/M2 | OXYGEN SATURATION: 91 %

## 2021-01-12 PROCEDURE — 6360000002 HC RX W HCPCS: Performed by: NURSE PRACTITIONER

## 2021-01-12 PROCEDURE — 97530 THERAPEUTIC ACTIVITIES: CPT

## 2021-01-12 PROCEDURE — 6370000000 HC RX 637 (ALT 250 FOR IP): Performed by: NURSE PRACTITIONER

## 2021-01-12 PROCEDURE — 2580000003 HC RX 258: Performed by: NURSE PRACTITIONER

## 2021-01-12 PROCEDURE — 97116 GAIT TRAINING THERAPY: CPT

## 2021-01-12 PROCEDURE — C9113 INJ PANTOPRAZOLE SODIUM, VIA: HCPCS | Performed by: NURSE PRACTITIONER

## 2021-01-12 RX ORDER — PANTOPRAZOLE SODIUM 40 MG/1
40 TABLET, DELAYED RELEASE ORAL
Qty: 90 TABLET | Refills: 1 | Status: SHIPPED | OUTPATIENT
Start: 2021-01-12

## 2021-01-12 RX ADMIN — CLOTRIMAZOLE AND BETAMETHASONE DIPROPIONATE: 10; .5 CREAM TOPICAL at 08:02

## 2021-01-12 RX ADMIN — SODIUM CHLORIDE, PRESERVATIVE FREE 10 ML: 5 INJECTION INTRAVENOUS at 08:01

## 2021-01-12 RX ADMIN — LISINOPRIL 40 MG: 40 TABLET ORAL at 08:01

## 2021-01-12 RX ADMIN — PANTOPRAZOLE SODIUM 40 MG: 40 INJECTION, POWDER, FOR SOLUTION INTRAVENOUS at 06:45

## 2021-01-12 RX ADMIN — SODIUM CHLORIDE, PRESERVATIVE FREE 10 ML: 5 INJECTION INTRAVENOUS at 06:35

## 2021-01-12 ASSESSMENT — PAIN SCALES - GENERAL: PAINLEVEL_OUTOF10: 0

## 2021-01-12 NOTE — FLOWSHEET NOTE
Pt does not have an Advanced directive in the chart and emergency contact non-relatives. Spoke to case manger to ask about relationship of emergency contact. Pt is in right mind and capable of completing a living will.  offered prayer and spiritual support.

## 2021-01-12 NOTE — CARE COORDINATION
Met c pt to initiate discharge planning. Discussed therapy rec'd of getting additional therapy, pt declined desire to go to SNF, too high functioning for ARU. Pt agreeable to [facility], does not have any preference for home care. Referral called to [facility], spoke with Thurmon Angelucci, she is verifying if they have staffing and will call me back. Pt states her friend Azalia Madera will be available to take her home. Pt also will need RW for home use, in network provider is [facility]. TC to [facility], spoke with Kateryna Interiano, provided referral and they will have available for pt to . Faxed orders/clinical to [phone]. TC to Azalia Madera, pt's contact and she states she believes pt has a walker at home. She will check when she takes pt home and if not will  walker for pt from [facility]. Jona Ballesteros will also be providing pt with transport home. Received call back from HENRY with [facility] and they can accept pt.   Faxed clinical.   Faxed orders and AVS to [facility] [phone]

## 2021-01-12 NOTE — PROGRESS NOTES
Physical Therapy    Physical Therapy Treatment Note  Name: Annette Bright MRN: [id number] :   1927   Date:  2021   Admission Date: 2021 Room:  [address] [place] [address]   Restrictions/Precautions:  Restrictions/Precautions  Restrictions/Precautions: General Precautions, Fall Risk          Subjective:  Patient states:  \"I could take a walk\"  Pain:    Location, Type, Intensity (0/10 to 10/10):  denies     Objective:    Observation:  Pt sitting upright in chair upon entry     Treatment, including education/measures:  Pt agreeable to participating in therapy at this time.     Therapeutic Activity Training:   Therapeutic activity training was instructed today. Cues were given for safety, sequence, UE/LE placement, awareness, and balance. Activities performed today included bed mobility training, sup-sit, sit-stand. Pt completed sit to stand from chair with CGAx1 with verbal cues to push through chair and avoid pulling on walker. Pt completed stand to sit onto chair with CGAx1 with verbal cues to feel chair against back of legs, reach back, and sit slowly. Increased time for all task completion.     Gait Training:  Cues were given for safety, sequence, device management, balance, posture, awareness, path. Pt ambulated 20 feet + 20 feet with assist varying from CGAx1 to minAx1 with a front wheeled walker with a decreased gait speed, a decreased step length bilaterally, and an intermittently unsteady gait. Pt provided with verbal and tactile cues for BLE placement, walker placement, and sequence throughout ambulation. Pt provided with verbal and tactile cues to maintain upright posture in order to avoid COM shifting outside of MISAEL. Pt provided with verbal cues for directions in order to successfully navigate hallway and return to correct room.     Safety  Patient left safely in the chair, with call light/phone in reach with alarm applied.  Gait belt and mask were used for transfers and gait.           Assessment / Impression:                  Patient's tolerance of treatment:  Good; patient tolerated ambulation in room today         Adverse Reaction: none  Significant change in status and impact:  none  Barriers to improvement:  Generalized weakness        Plan for Next Session:    Continue progressing toward goals per plan of care. Progress independence with transfers and ambulation as tolerated and appropriate. Progress ambulation distance as tolerated and appropriate.     Time in:  1141  Time out:  1220  Timed treatment minutes:  39  Total treatment time:  44    Previously filed items:  Social/Functional History  Lives With: Alone  Type of Home: Apartment(independent living)  Home Layout: One level  Home Access: Level entry  Bathroom Shower/Tub: Walk-in shower  Bathroom Toilet: Handicap height  Bathroom Equipment: Shower chair, Grab bars in shower, Grab bars around toilet  ADL Assistance: [address]: Independent  Transfer Assistance: [address] term goals  Time Frame for Long term goals :  In one week:  Long term goal 1: Pt will complete all bed mobility with supervision  Long term goal 2: Pt will complete sit <> stand transfers with supervision  Long term goal 3: Pt will ambulate 200 feet with SBAx1 with LRAD  Long term goal 4: Pt will independently complete 3 sets of 10 reps of BLE AROM exercises in available and allowed ROM    Electronically signed by:      Elena Glover PT, DPT  License #: [id number]

## 2021-01-12 NOTE — DISCHARGE INSTR - COC
Continuity of Care Form    Patient Name: Anuj Calderon   :  1927  MRN:  [id number]    Admit date:  2021  Discharge date:  ***    Code Status Order: Full Code   Advance Directives:   [facility] Documentation     Date/Time Healthcare Directive Type of Healthcare Directive Copy in [facility] [address] Agent's Name Healthcare Agent's Phone Number    21  No, patient does not have an advance directive for healthcare treatment -- -- -- -- --    21  No, patient does not have an advance directive for healthcare treatment -- -- -- -- --          Admitting Physician:  Zack Benson MD  PCP: Mallorie Bender MD    Discharging Nurse: [facility] Unit/Room#: 4108/4108-A  Discharging Unit Phone Number: ***    Emergency Contact:   Extended Emergency Contact Information  Primary Emergency Contact: Michel Shetty [place] [address] [place] [address] Phone: [phone]  Mobile Phone: [phone]  Relation: Other  Secondary Emergency Contact: [address] Phone: [phone]  Relation: Other    Past Surgical History:  Past Surgical History:   Procedure Laterality Date    COLONOSCOPY N/A 2021    COLONOSCOPY DIAGNOSTIC performed by Sheng Quintana MD at [facility] ENDOSCOPY       Immunization History:   Immunization History   Administered Date(s) Administered    Influenza, High Dose (Fluzone 65 yrs and older) 10/08/2020       Active Problems:  Patient Active Problem List   Diagnosis Code    Rectal bleeding K62.5       Isolation/Infection:   Isolation          No Isolation        Patient Infection Status     Infection Onset Added Last Indicated Last Indicated By Review Planned Expiration Resolved Resolved By    None active    Resolved    COVID-19 Rule Out 20 Covid-19 Ambulatory (Ordered)   20 Rule-Out Test Resulted          Nurse Assessment:  Last Vital Signs: BP (!) 149/80   Pulse 82   Temp 98.1 °F (36.7 °C) (Oral) Resp 16   Ht 5' (1.524 m)   Wt 138 lb 14.4 oz (63 kg)   SpO2 91%   BMI 27.13 kg/m²     Last documented pain score (0-10 scale): Pain Level: 0  Last Weight:   Wt Readings from Last 1 Encounters:   01/10/21 138 lb 14.4 oz (63 kg)     Mental Status:  Oriented w/ confustion at times    IV Access:  - None    Nursing Mobility/ADLs:  Walking   {CHP DME NQTV:587037527}  Transfer  {CHP DME EHZW:910297435}  Bathing  {CHP DME SLNI:816577832}  Dressing  {CHP DME PYDX:414679783}  Toileting  {CHP DME YXYB:608313624}  Feeding  {CHP DME RBKL:363885180}  Med Admin  {CHP DME KRGP:303077740}  Med Delivery   { CRISTINA MED Delivery:933202090}    Wound Care Documentation and Therapy:        Elimination:  Continence:   · Bowel: {YES / BQ:64327}  · Bladder: {YES / QH:48195}  Urinary Catheter: {Urinary Catheter:710493530}   Colostomy/Ileostomy/Ileal Conduit: {YES / RA:68310}       Date of Last BM: ***    Intake/Output Summary (Last 24 hours) at 2021 1240  Last data filed at 2021 1704  Gross per 24 hour   Intake 10 ml   Output --   Net 10 ml     I/O last 3 completed shifts:   In: 20 [I.V.:20]  Out: 200 [Urine:200]    Safety Concerns:     [address] [employer] Safety Concerns:286547704}    Impairments/Disabilities:      [address] Impairments/Disabilities:054477428}    Nutrition Therapy:  Current Nutrition Therapy:   [address] [employer] Diet List:610577046}    Routes of Feeding: {P DME Other Feedings:223728203}  Liquids: {Slp liquid thickness:72229}  Daily Fluid Restriction: {CHP DME Yes amt example:400634056}  Last Modified Barium Swallow with Video (Video Swallowing Test): {Done Not Done YSUR:447414517}    Treatments at the Time of Hospital Discharge:   Respiratory Treatments: ***  Oxygen Therapy:  {Therapy; copd oxygen:90819}  Ventilator:    { CC Vent UBKP:710685555}    Rehab Therapies: {THERAPEUTIC INTERVENTION:3238624610}  Weight Bearing Status/Restrictions: [address] HELLER Weight Bearin}  Other Medical Equipment (for information only, NOT a DME order):  {EQUIPMENT:224343492}  Other Treatments: ***    Patient's personal belongings (please select all that are sent with patient):  {CHP DME Belongings:217787794}    RN SIGNATURE:  {Esignature:265054343}    CASE MANAGEMENT/SOCIAL WORK SECTION    Inpatient Status Date: ***    Readmission Risk Assessment Score:  Readmission Risk              Risk of Unplanned Readmission:        9           Discharging to Facility/ Agency   · Name:   · Address:  · Phone:  · Fax:    Dialysis Facility (if applicable)   · Name:  · Address:  · Dialysis Schedule:  · Phone:  · Fax:    / signature: {Esignature:170077612}    PHYSICIAN SECTION    Prognosis: {Prognosis:4019145395}    Condition at Discharge: [address] [place] [address] Patient Condition:772574401}    Rehab Potential (if transferring to Rehab): {Prognosis:8173548139}    Recommended Labs or Other Treatments After Discharge: ***    Physician Certification: I certify the above information and transfer of Katharine Roth  is necessary for the continuing treatment of the diagnosis listed and that she requires {Admit to Appropriate Level of Care:93171} for {GREATER/LESS:242862237} 30 days.      Update Admission H&P: {CHP DME Changes in HFXOL:300401962}    PHYSICIAN SIGNATURE:  {Esignature:835477592}

## 2021-01-12 NOTE — DISCHARGE SUMMARY
Discharge Summary    Name:  Carol Cleveland /Age/Sex: 1927  (80 y.o. female)   MRN & CSN:  [id number] & [id number] Admission Date/Time: 2021 10:01 AM   Attending:  Hope Rodriguez MD Discharging Physician: Hope Rodriguez MD     Hospital Course:   Carol Cleveland is a 80 y.o.  female  who presents with Rectal bleeding    Bright red blood per rectum: Resolved  · Continue pantoprazole on discharge. · Hemoglobin stable. .   · Rivaroxaban restarted yesterday. · Status post colonoscopy with diverticulosis. · GI following.     Maculopapular rash right lower extremity: Continue steroid. Hypertension: Blood pressure controlled. Continue medications. Chronic deep vein thrombosis: Anticoagulation restarted. Mild cognitive impairment: On Aricept. Confusion resolved. She was discharged stable. Discussed with Asael Frazier    The patient expressed appropriate understanding of and agreement with the discharge recommendations, medications, and plan. Consults this admission:  IP CONSULT TO GI  IP CONSULT TO HOSPITALIST  IP CONSULT TO IV TEAM  IP CONSULT TO [address] [place] [address] NEEDS    Discharge Instruction:   Follow up appointments: Gastroenterology  Primary care physician:  within 2 weeks    Diet:  regular diet   Activity: activity as tolerated  Disposition: Discharged to:   [x]Home, []C, []SNF, []Acute Rehab, []Hospice   Condition on discharge: Stable    Discharge Medications:      Matthew Castillo   [place] Medication Instructions [facility]:[id number]    Printed on:21 4085   Medication Information                      clotrimazole-betamethasone (LOTRISONE) 1-0.05 % cream  Apply to affected areas on lower legs/arms/neck as needed for up to 2 weeks for rash. donepezil (ARICEPT) 5 MG tablet  TAKE ONE TABLET BY MOUTH EVERY EVENING             lisinopril (PRINIVIL;ZESTRIL) 40 MG tablet  TAKE ONE TABLET BY MOUTH EVERY DAY FOR high blood pressure; may take at night.              pantoprazole (PROTONIX) 40 MG tablet  Take 1 tablet by mouth every morning (before breakfast)             pravastatin (PRAVACHOL) 40 MG tablet  TAKE ONE TABLET BY MOUTH AT BEDTIME             rivaroxaban (XARELTO) 20 MG TABS tablet  TAKE ONE TABLET BY MOUTH EVERY DAY with evening meal FOR blood clots                 Objective Findings at Discharge:   BP (!) 149/80   Pulse 82   Temp 98.1 °F (36.7 °C) (Oral)   Resp 16   Ht 5' (1.524 m)   Wt 138 lb 14.4 oz (63 kg)   SpO2 91%   BMI 27.13 kg/m²            PHYSICAL EXAM   GEN Awake female, sitting upright in bed in no apparent distress. Appears given age. EYES Pupils are equally round. No scleral erythema, discharge, or conjunctivitis. HENT Mucous membranes are moist. Oral pharynx without exudates, no evidence of thrush. NECK Supple, no apparent thyromegaly or masses. RESP Clear to auscultation, no wheezes, rales or rhonchi. Symmetric chest movement while on room air. CARDIO/VASC S1/S2 auscultated. Regular rate without appreciable murmurs, rubs, or gallops. No JVD or carotid bruits. Peripheral pulses equal bilaterally and palpable. No peripheral edema. GI Abdomen is soft without significant tenderness, masses, or guarding. Bowel sounds are normoactive. Rectal exam deferred. MSK No gross joint deformities. SKIN Normal coloration, warm, dry. NEURO Cranial nerves appear grossly intact, normal speech, no lateralizing weakness. PSYCH Awake, alert, oriented x 4. Affect appropriate.     BMP/CBC  Recent Labs     01/09/21  2133 01/10/21  0912   NA  --  141   K  --  3.8   CL  --  107   CO2  --  25   BUN  --  5*   CREATININE  --  0.9   WBC  --  5.8   HCT 36.6* 36.8*   PLT  --  189       IMAGING:  Ct Abdomen Pelvis W Iv Contrast Additional Contrast? None    Result Date: 1/7/2021  EXAMINATION: CT OF THE ABDOMEN AND PELVIS WITH CONTRAST 1/7/2021 11:33 am TECHNIQUE: CT of the abdomen and pelvis was performed with the administration of intravenous contrast. Multiplanar reformatted images are provided for review. Dose modulation, iterative reconstruction, and/or weight based adjustment of the mA/kV was utilized to reduce the radiation dose to as low as reasonably achievable. COMPARISON: None. HISTORY: ORDERING SYSTEM PROVIDED HISTORY: rectal bleeding TECHNOLOGIST PROVIDED HISTORY: Reason for exam:->rectal bleeding Additional Contrast?->None Reason for Exam: RECTAL BLEEDING Acuity: Acute Type of Exam: Initial Additional signs and symptoms: NONE Relevant Medical/Surgical History: 75ML ISOVUE 370/ GFR 52 FINDINGS: Lower Chest: No focal infiltrate. Organs: The liver, gallbladder, spleen, pancreas and adrenal glands are unremarkable. Both kidneys are functional and there is no hydronephrosis. GI/Bowel: There is a small-to-moderate hiatal hernia. No bowel obstruction is demonstrated. There is extensive diverticulosis without evidence of diverticulitis. The appendix is normal. Pelvis: The urinary bladder is unremarkable. No pelvic mass or fluid collection. Peritoneum/Retroperitoneum: The abdominal aorta is atherosclerotic and tortuous. There is a 3.7 x 3.5 cm abdominal aortic aneurysm. No retroperitoneal lymphadenopathy. Bones/Soft Tissues: No suspicious osteolytic or osteoblastic lesions are demonstrated. There is advanced degenerative disc disease throughout the spine. 1. No acute abnormality in the abdomen/pelvis. 2. Extensive diverticulosis. No evidence of diverticulitis. 3. 3.7 cm abdominal aortic aneurysm. A follow-up CT in 2 years is recommended, as referenced below. RECOMMENDATIONS: For management of fusiform AAA: 3.5-3.9 cm AAA, recommend follow-up every 2 years. References: Marisa Castellanosurry Radiol 2013; 38(22):599-322; J Vasc Surg.  2018; 67:2-77     Discharge Time of 20 minutes    Electronically signed by Ran Valladares MD on 1/12/2021 at 11:25 AM

## 2021-01-12 NOTE — CARE COORDINATION
Paige Zendejas was evaluated today and a DME order was entered for a wheeled walker because she requires this to successfully complete daily living tasks of eating, bathing, toileting, personal cares, ambulating, grooming, hygiene, dressing upper body, dressing lower body, meal preparation and taking own medications. A wheeled walker is necessary due to the patient's unsteady gait, upper body weakness, and inability to  an ambulation device; and she can ambulate only by pushing a walker instead of a lesser assistive device such as a cane, crutch, or standard walker. The need for this equipment was discussed with the patient and she understands and is in agreement.

## 2021-07-02 ENCOUNTER — HOSPITAL ENCOUNTER (OUTPATIENT)
Age: 86
Setting detail: SPECIMEN
Discharge: HOME | End: 2021-07-02
Payer: MEDICARE

## 2021-07-23 ENCOUNTER — HOSPITAL ENCOUNTER (OUTPATIENT)
Age: 86
Setting detail: SPECIMEN
Discharge: HOME | End: 2021-07-23
Payer: MEDICARE

## 2021-07-23 LAB
ALBUMIN SERPL-MCNC: 4 GM/DL (ref 3.4–5)
ALP BLD-CCNC: 96 IU/L (ref 40–128)
ALT SERPL-CCNC: 8 U/L (ref 10–40)
ANION GAP SERPL CALCULATED.3IONS-SCNC: 8 MMOL/L (ref 4–16)
AST SERPL-CCNC: 13 IU/L (ref 15–37)
BILIRUB SERPL-MCNC: 0.4 MG/DL (ref 0–1)
BUN BLDV-MCNC: 14 MG/DL (ref 6–23)
CALCIUM SERPL-MCNC: 9.1 MG/DL (ref 8.3–10.6)
CHLORIDE BLD-SCNC: 107 MMOL/L (ref 99–110)
CHOLESTEROL: 154 MG/DL
CO2: 29 MMOL/L (ref 21–32)
CREAT SERPL-MCNC: 0.9 MG/DL (ref 0.6–1.1)
GFR AFRICAN AMERICAN: >60 ML/MIN/1.73M2
GFR NON-AFRICAN AMERICAN: 58 ML/MIN/1.73M2
GLUCOSE BLD-MCNC: 86 MG/DL (ref 70–99)
HCT VFR BLD CALC: 48.1 % (ref 37–47)
HDLC SERPL-MCNC: 66 MG/DL
HEMOGLOBIN: 14.5 GM/DL (ref 12.5–16)
LDL CHOLESTEROL DIRECT: 72 MG/DL
MCH RBC QN AUTO: 27.9 PG (ref 27–31)
MCHC RBC AUTO-ENTMCNC: 30.1 % (ref 32–36)
MCV RBC AUTO: 92.7 FL (ref 78–100)
PDW BLD-RTO: 15.5 % (ref 11.7–14.9)
PLATELET # BLD: 234 K/CU MM (ref 140–440)
PMV BLD AUTO: 10.5 FL (ref 7.5–11.1)
POTASSIUM SERPL-SCNC: 4.1 MMOL/L (ref 3.5–5.1)
RBC # BLD: 5.19 M/CU MM (ref 4.2–5.4)
SODIUM BLD-SCNC: 144 MMOL/L (ref 135–145)
TOTAL PROTEIN: 6.7 GM/DL (ref 6.4–8.2)
TRIGL SERPL-MCNC: 109 MG/DL
WBC # BLD: 5.6 K/CU MM (ref 4–10.5)

## 2021-07-23 PROCEDURE — 83721 ASSAY OF BLOOD LIPOPROTEIN: CPT

## 2021-07-23 PROCEDURE — 36415 COLL VENOUS BLD VENIPUNCTURE: CPT

## 2021-07-23 PROCEDURE — 85027 COMPLETE CBC AUTOMATED: CPT

## 2021-07-23 PROCEDURE — 80061 LIPID PANEL: CPT

## 2021-07-23 PROCEDURE — 80053 COMPREHEN METABOLIC PANEL: CPT

## 2021-10-15 ENCOUNTER — HOSPITAL ENCOUNTER (OUTPATIENT)
Age: 86
Setting detail: SPECIMEN
Discharge: HOME | End: 2021-10-15
Payer: MEDICARE

## 2021-10-22 ENCOUNTER — HOSPITAL ENCOUNTER (OUTPATIENT)
Age: 86
Setting detail: SPECIMEN
Discharge: HOME | End: 2021-10-22
Payer: MEDICARE

## 2021-10-22 LAB
ALBUMIN SERPL-MCNC: 3.8 GM/DL (ref 3.4–5)
ALP BLD-CCNC: 90 IU/L (ref 40–128)
ALT SERPL-CCNC: 9 U/L (ref 10–40)
AMMONIA: 20 UMOL/L (ref 11–51)
ANION GAP SERPL CALCULATED.3IONS-SCNC: 9 MMOL/L (ref 4–16)
AST SERPL-CCNC: 14 IU/L (ref 15–37)
BILIRUB SERPL-MCNC: 0.3 MG/DL (ref 0–1)
BUN BLDV-MCNC: 14 MG/DL (ref 6–23)
CALCIUM SERPL-MCNC: 9.1 MG/DL (ref 8.3–10.6)
CHLORIDE BLD-SCNC: 109 MMOL/L (ref 99–110)
CO2: 25 MMOL/L (ref 21–32)
CREAT SERPL-MCNC: 1 MG/DL (ref 0.6–1.1)
GFR AFRICAN AMERICAN: >60 ML/MIN/1.73M2
GFR NON-AFRICAN AMERICAN: 52 ML/MIN/1.73M2
GLUCOSE BLD-MCNC: 106 MG/DL (ref 70–99)
HCT VFR BLD CALC: 42.1 % (ref 37–47)
HEMOGLOBIN: 12.5 GM/DL (ref 12.5–16)
MCH RBC QN AUTO: 28 PG (ref 27–31)
MCHC RBC AUTO-ENTMCNC: 29.7 % (ref 32–36)
MCV RBC AUTO: 94.2 FL (ref 78–100)
PDW BLD-RTO: 13.6 % (ref 11.7–14.9)
PLATELET # BLD: 256 K/CU MM (ref 140–440)
PMV BLD AUTO: 10.1 FL (ref 7.5–11.1)
POTASSIUM SERPL-SCNC: 4.4 MMOL/L (ref 3.5–5.1)
RBC # BLD: 4.47 M/CU MM (ref 4.2–5.4)
SODIUM BLD-SCNC: 143 MMOL/L (ref 135–145)
TOTAL PROTEIN: 6.3 GM/DL (ref 6.4–8.2)
TSH HIGH SENSITIVITY: 1.88 UIU/ML (ref 0.27–4.2)
WBC # BLD: 5.5 K/CU MM (ref 4–10.5)

## 2021-10-22 PROCEDURE — 82140 ASSAY OF AMMONIA: CPT

## 2021-10-22 PROCEDURE — 36415 COLL VENOUS BLD VENIPUNCTURE: CPT

## 2021-10-22 PROCEDURE — 84443 ASSAY THYROID STIM HORMONE: CPT

## 2021-10-22 PROCEDURE — 85027 COMPLETE CBC AUTOMATED: CPT

## 2021-10-22 PROCEDURE — 80053 COMPREHEN METABOLIC PANEL: CPT

## 2022-05-23 ENCOUNTER — HOSPITAL ENCOUNTER (INPATIENT)
Age: 87
LOS: 11 days | Discharge: NURSING, REHAB OR LONG-TERM CARE | DRG: 871 | End: 2022-06-03
Attending: EMERGENCY MEDICINE | Admitting: INTERNAL MEDICINE
Payer: MEDICARE

## 2022-05-23 ENCOUNTER — APPOINTMENT (OUTPATIENT)
Dept: CT IMAGING | Age: 87
DRG: 871 | End: 2022-05-23
Payer: MEDICARE

## 2022-05-23 ENCOUNTER — APPOINTMENT (OUTPATIENT)
Dept: GENERAL RADIOLOGY | Age: 87
DRG: 871 | End: 2022-05-23
Payer: MEDICARE

## 2022-05-23 ENCOUNTER — APPOINTMENT (OUTPATIENT)
Dept: ULTRASOUND IMAGING | Age: 87
DRG: 871 | End: 2022-05-23
Payer: MEDICARE

## 2022-05-23 ENCOUNTER — APPOINTMENT (OUTPATIENT)
Dept: MRI IMAGING | Age: 87
DRG: 871 | End: 2022-05-23
Payer: MEDICARE
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
## 2022-05-23 DIAGNOSIS — R41.82 ALTERED MENTAL STATUS, UNSPECIFIED ALTERED MENTAL STATUS TYPE: ICD-10-CM

## 2022-05-23 DIAGNOSIS — R91.1 PULMONARY NODULE: ICD-10-CM
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
## 2022-05-23 LAB
ALBUMIN SERPL-MCNC: 4.1 GM/DL (ref 3.4–5)
ALP BLD-CCNC: 99 IU/L (ref 40–129)
ALT SERPL-CCNC: 12 U/L (ref 10–40)
ANION GAP SERPL CALCULATED.3IONS-SCNC: 14 MMOL/L (ref 4–16)
APTT: 27.4 SECONDS (ref 25.1–37.1)
AST SERPL-CCNC: 25 IU/L (ref 15–37)
BASE EXCESS MIXED: 2.8 (ref 0–2.3)
BASE EXCESS: 0 (ref 0–2.4)
BASOPHILS ABSOLUTE: 0 K/CU MM
BASOPHILS RELATIVE PERCENT: 0.3 % (ref 0–1)
BILIRUB SERPL-MCNC: 1 MG/DL (ref 0–1)
BUN BLDV-MCNC: 10 MG/DL (ref 6–23)
CALCIUM SERPL-MCNC: 9.7 MG/DL (ref 8.3–10.6)
CHLORIDE BLD-SCNC: 101 MMOL/L (ref 99–110)
CO2: 26 MMOL/L (ref 21–32)
COMMENT: ABNORMAL
CREAT SERPL-MCNC: 0.8 MG/DL (ref 0.6–1.1)
DIFFERENTIAL TYPE: ABNORMAL
EKG ATRIAL RATE: 76 BPM
EKG DIAGNOSIS: NORMAL
EKG Q-T INTERVAL: 364 MS
EKG QRS DURATION: 126 MS
EKG QTC CALCULATION (BAZETT): 440 MS
EKG R AXIS: -76 DEGREES
EKG T AXIS: 46 DEGREES
EKG VENTRICULAR RATE: 88 BPM
EOSINOPHILS ABSOLUTE: 0 K/CU MM
EOSINOPHILS RELATIVE PERCENT: 0 % (ref 0–3)
GFR AFRICAN AMERICAN: >60 ML/MIN/1.73M2
GFR NON-AFRICAN AMERICAN: >60 ML/MIN/1.73M2
GLUCOSE BLD-MCNC: 126 MG/DL (ref 70–99)
HCO3 VENOUS: 25.4 MMOL/L (ref 19–25)
HCO3 VENOUS: 31 MMOL/L (ref 19–25)
HCT VFR BLD CALC: 54.5 % (ref 37–47)
HEMOGLOBIN: 17.1 GM/DL (ref 12.5–16)
IMMATURE NEUTROPHIL %: 0.5 % (ref 0–0.43)
INR BLD: 1.13 INDEX
LACTATE: 2.6 MMOL/L (ref 0.4–2)
LIPASE: 43 IU/L (ref 13–60)
LYMPHOCYTES ABSOLUTE: 0.7 K/CU MM
LYMPHOCYTES RELATIVE PERCENT: 6.3 % (ref 24–44)
MAGNESIUM: 2.3 MG/DL (ref 1.8–2.4)
MCH RBC QN AUTO: 28.4 PG (ref 27–31)
MCHC RBC AUTO-ENTMCNC: 31.4 % (ref 32–36)
MCV RBC AUTO: 90.4 FL (ref 78–100)
MONOCYTES ABSOLUTE: 0.9 K/CU MM
MONOCYTES RELATIVE PERCENT: 7.3 % (ref 0–4)
NUCLEATED RBC %: 0 %
O2 SAT, VEN: 62.2 % (ref 50–70)
O2 SAT, VEN: 86 % (ref 50–70)
PCO2, VEN: 45 MMHG (ref 38–52)
PCO2, VEN: 63 MMHG (ref 38–52)
PDW BLD-RTO: 13.6 % (ref 11.7–14.9)
PH VENOUS: 7.3 (ref 7.32–7.42)
PH VENOUS: 7.36 (ref 7.32–7.42)
PLATELET # BLD: 216 K/CU MM (ref 140–440)
PMV BLD AUTO: 10.6 FL (ref 7.5–11.1)
PO2, VEN: 33 MMHG (ref 28–48)
PO2, VEN: 55 MMHG (ref 28–48)
POTASSIUM SERPL-SCNC: 4.3 MMOL/L (ref 3.5–5.1)
PRO-BNP: 866.6 PG/ML
PROTHROMBIN TIME: 14.6 SECONDS (ref 11.7–14.5)
RBC # BLD: 6.03 M/CU MM (ref 4.2–5.4)
SARS-COV-2, NAAT: NOT DETECTED
SEGMENTED NEUTROPHILS ABSOLUTE COUNT: 10.1 K/CU MM
SEGMENTED NEUTROPHILS RELATIVE PERCENT: 85.6 % (ref 36–66)
SODIUM BLD-SCNC: 141 MMOL/L (ref 135–145)
TOTAL CK: 499 IU/L (ref 26–140)
TOTAL IMMATURE NEUTOROPHIL: 0.06 K/CU MM
TOTAL NUCLEATED RBC: 0 K/CU MM
TOTAL PROTEIN: 7.9 GM/DL (ref 6.4–8.2)
TROPONIN T: 0.01 NG/ML
TROPONIN T: <0.01 NG/ML
TSH HIGH SENSITIVITY: 2.29 UIU/ML (ref 0.27–4.2)
WBC # BLD: 11.8 K/CU MM (ref 4–10.5)

## 2022-05-23 PROCEDURE — 99285 EMERGENCY DEPT VISIT HI MDM: CPT

## 2022-05-23 PROCEDURE — 72131 CT LUMBAR SPINE W/O DYE: CPT

## 2022-05-23 PROCEDURE — 93010 ELECTROCARDIOGRAM REPORT: CPT | Performed by: INTERNAL MEDICINE

## 2022-05-23 PROCEDURE — 85025 COMPLETE CBC W/AUTO DIFF WBC: CPT

## 2022-05-23 PROCEDURE — 85730 THROMBOPLASTIN TIME PARTIAL: CPT

## 2022-05-23 PROCEDURE — 83735 ASSAY OF MAGNESIUM: CPT

## 2022-05-23 PROCEDURE — 70551 MRI BRAIN STEM W/O DYE: CPT

## 2022-05-23 PROCEDURE — 83690 ASSAY OF LIPASE: CPT

## 2022-05-23 PROCEDURE — 72125 CT NECK SPINE W/O DYE: CPT

## 2022-05-23 PROCEDURE — 82803 BLOOD GASES ANY COMBINATION: CPT

## 2022-05-23 PROCEDURE — 93880 EXTRACRANIAL BILAT STUDY: CPT

## 2022-05-23 PROCEDURE — 87150 DNA/RNA AMPLIFIED PROBE: CPT

## 2022-05-23 PROCEDURE — 6370000000 HC RX 637 (ALT 250 FOR IP): Performed by: NURSE PRACTITIONER

## 2022-05-23 PROCEDURE — 82550 ASSAY OF CK (CPK): CPT

## 2022-05-23 PROCEDURE — 72170 X-RAY EXAM OF PELVIS: CPT

## 2022-05-23 PROCEDURE — 84443 ASSAY THYROID STIM HORMONE: CPT

## 2022-05-23 PROCEDURE — 72128 CT CHEST SPINE W/O DYE: CPT

## 2022-05-23 PROCEDURE — 83880 ASSAY OF NATRIURETIC PEPTIDE: CPT

## 2022-05-23 PROCEDURE — 80053 COMPREHEN METABOLIC PANEL: CPT

## 2022-05-23 PROCEDURE — 2500000003 HC RX 250 WO HCPCS: Performed by: EMERGENCY MEDICINE

## 2022-05-23 PROCEDURE — 87186 SC STD MICRODIL/AGAR DIL: CPT

## 2022-05-23 PROCEDURE — 82805 BLOOD GASES W/O2 SATURATION: CPT

## 2022-05-23 PROCEDURE — 87635 SARS-COV-2 COVID-19 AMP PRB: CPT

## 2022-05-23 PROCEDURE — 83605 ASSAY OF LACTIC ACID: CPT

## 2022-05-23 PROCEDURE — 92610 EVALUATE SWALLOWING FUNCTION: CPT

## 2022-05-23 PROCEDURE — 84484 ASSAY OF TROPONIN QUANT: CPT

## 2022-05-23 PROCEDURE — 71045 X-RAY EXAM CHEST 1 VIEW: CPT

## 2022-05-23 PROCEDURE — 36415 COLL VENOUS BLD VENIPUNCTURE: CPT

## 2022-05-23 PROCEDURE — 85610 PROTHROMBIN TIME: CPT

## 2022-05-23 PROCEDURE — 87040 BLOOD CULTURE FOR BACTERIA: CPT

## 2022-05-23 PROCEDURE — 70450 CT HEAD/BRAIN W/O DYE: CPT

## 2022-05-23 PROCEDURE — 84145 PROCALCITONIN (PCT): CPT

## 2022-05-23 PROCEDURE — 93005 ELECTROCARDIOGRAM TRACING: CPT | Performed by: EMERGENCY MEDICINE

## 2022-05-23 PROCEDURE — 2580000003 HC RX 258: Performed by: NURSE PRACTITIONER

## 2022-05-23 PROCEDURE — 1200000000 HC SEMI PRIVATE

## 2022-05-23 RX ORDER — ACETAMINOPHEN 650 MG/1
650 SUPPOSITORY RECTAL EVERY 6 HOURS PRN
Status: DISCONTINUED | OUTPATIENT
Start: 2022-05-23 | End: 2022-06-03 | Stop reason: HOSPADM

## 2022-05-23 RX ORDER — ACETAMINOPHEN 325 MG/1
650 TABLET ORAL EVERY 6 HOURS PRN
Status: DISCONTINUED | OUTPATIENT
Start: 2022-05-23 | End: 2022-06-03 | Stop reason: HOSPADM

## 2022-05-23 RX ORDER — ONDANSETRON 2 MG/ML
4 INJECTION INTRAMUSCULAR; INTRAVENOUS EVERY 6 HOURS PRN
Status: DISCONTINUED | OUTPATIENT
Start: 2022-05-23 | End: 2022-06-03 | Stop reason: HOSPADM

## 2022-05-23 RX ORDER — ONDANSETRON 4 MG/1
4 TABLET, ORALLY DISINTEGRATING ORAL EVERY 8 HOURS PRN
Status: DISCONTINUED | OUTPATIENT
Start: 2022-05-23 | End: 2022-06-03 | Stop reason: HOSPADM

## 2022-05-23 RX ORDER — SODIUM CHLORIDE 0.9 % (FLUSH) 0.9 %
5-40 SYRINGE (ML) INJECTION EVERY 12 HOURS SCHEDULED
Status: DISCONTINUED | OUTPATIENT
Start: 2022-05-23 | End: 2022-06-03 | Stop reason: HOSPADM

## 2022-05-23 RX ORDER — POLYETHYLENE GLYCOL 3350 17 G/17G
17 POWDER, FOR SOLUTION ORAL DAILY PRN
Status: DISCONTINUED | OUTPATIENT
Start: 2022-05-23 | End: 2022-06-03 | Stop reason: HOSPADM

## 2022-05-23 RX ORDER — DONEPEZIL HYDROCHLORIDE 5 MG/1
5 TABLET, FILM COATED ORAL EVERY EVENING
Status: DISCONTINUED | OUTPATIENT
Start: 2022-05-23 | End: 2022-06-03 | Stop reason: HOSPADM

## 2022-05-23 RX ORDER — LISINOPRIL 20 MG/1
40 TABLET ORAL DAILY
Status: DISCONTINUED | OUTPATIENT
Start: 2022-05-23 | End: 2022-06-01

## 2022-05-23 RX ORDER — PANTOPRAZOLE SODIUM 40 MG/1
40 TABLET, DELAYED RELEASE ORAL
Status: DISCONTINUED | OUTPATIENT
Start: 2022-05-24 | End: 2022-06-03 | Stop reason: HOSPADM

## 2022-05-23 RX ORDER — SODIUM CHLORIDE 450 MG/100ML
INJECTION, SOLUTION INTRAVENOUS CONTINUOUS
Status: DISPENSED | OUTPATIENT
Start: 2022-05-23 | End: 2022-05-24

## 2022-05-23 RX ORDER — SODIUM CHLORIDE 0.9 % (FLUSH) 0.9 %
5-40 SYRINGE (ML) INJECTION PRN
Status: DISCONTINUED | OUTPATIENT
Start: 2022-05-23 | End: 2022-06-03 | Stop reason: HOSPADM

## 2022-05-23 RX ORDER — SODIUM CHLORIDE 9 MG/ML
INJECTION, SOLUTION INTRAVENOUS PRN
Status: DISCONTINUED | OUTPATIENT
Start: 2022-05-23 | End: 2022-06-03 | Stop reason: HOSPADM

## 2022-05-23 RX ADMIN — SODIUM CHLORIDE, PRESERVATIVE FREE 10 ML: 5 INJECTION INTRAVENOUS at 19:35

## 2022-05-23 RX ADMIN — SODIUM CHLORIDE: 4.5 INJECTION, SOLUTION INTRAVENOUS at 19:34

## 2022-05-23 RX ADMIN — MICONAZOLE NITRATE: 2 POWDER TOPICAL at 14:00

## 2022-05-23 RX ADMIN — DONEPEZIL HYDROCHLORIDE 5 MG: 5 TABLET, FILM COATED ORAL at 22:09

## 2022-05-23 RX ADMIN — LISINOPRIL 40 MG: 20 TABLET ORAL at 22:09

## 2022-05-23 NOTE — ED TRIAGE NOTES
81 Y/O presented to ED due to fall at residential facility. Patient found in bathroom , on tile floor, possibly since last saturday. States having pain everywhere     Denies LOC, or head trauma or syncopal or dizziness.

## 2022-05-23 NOTE — PROGRESS NOTES
Pt unable to get MRI- done this RN spoke with Shonda Holland MRI screening completed- a few answers unknown. MRI is gone for the day. It was ordered stat. House supervisor Emanuel DAMON aware of MRI orders, instructed this RN to have Xray call MRI in. Charge RN and Shanika Roman night shift RN aware. This RN notified Xr - for MRI call in.

## 2022-05-23 NOTE — PROGRESS NOTES
This RT made two unsuccessful attempts to acquire ABG from pt with assistance from RN due to pt confusion and unable to cooperate. Dr Dickson Cline alerted and VBG to be order instead.

## 2022-05-23 NOTE — PROGRESS NOTES
4 Eyes Skin Assessment     NAME:  Anya Felder  YOB: 1927  MEDICAL RECORD NUMBER:  [id number]    The patient is being assess for  Admission    I agree that 2 RN's have performed a thorough Head to Toe Skin Assessment on the patient. ALL assessment sites listed below have been assessed. Areas assessed by both nurses:    Head, Face, Ears, Shoulders, Back, Chest, Arms, Elbows, Hands, Sacrum. Buttock, Coccyx, Ischium and Legs. Feet and Heels. Redness/excoriation to bilat groins, redness/exoriation to sternum mid chest and under breasts. Redness back of head, Left ear, bruise to right hip, redness L. Heel. Redness buttocks. Does the Patient have a Wound? Yes wound(s) were present on assessment.  LDA wound assessment was Initiated and completed        Jack Prevention initiated:  YES   Wound Care Orders initiated:  YES    Pressure Injury (Stage 3,4, Unstageable, DTI, NWPT, and Complex wounds) if present place consult order under [de-identified]     New and Established Ostomies if present place consult order under : NO    Nurse 1 eSignature: Electronically signed by Marbella Whaley RN on 5/23/22 at 6:17 PM EDT    **SHARE this note so that the co-signing nurse is able to place an eSignature**    Nurse 2 eSignature: Electronically signed by Andreas Mosquera RN on 5/23/22 at 6:37 PM EDT

## 2022-05-23 NOTE — CARE COORDINATION
CM received consult from Dr Wilder Mercado for patient in room #20 to assist with discharge planning. CM met with patient to begin discharge planning. CM introduced self and CM role. Patient slow to respond. Patient appears slightly confused, able to state name. Unsure if patient has confusion at baseline. Patient presents to ER following a fall. Patient was reportedly laying on the floor since Saturday. Patient lives alone in independent living at [place]. Patient has a PCP and insurance which assists with medication affordability. Patient uses the following DME: walker, shower seat, grab bars, life alert button. Patient states \"I have a girl that comes over and helps with all kinds of things\". CM unsure if [facility]. CM discussed SNF for short term rehabilitation. Patient politely declined. States Shereen Nguyen can come to my house. I have a noé apartment\". Patient agreeable to [place] [address]. Patient chose [facility].

## 2022-05-23 NOTE — PROGRESS NOTES
Speech Language Pathology  Facility/Department: [address] [place] [address] EMERGENCY DEPARTMENT   CLINICAL BEDSIDE SWALLOW EVALUATION    NAME: Wale Kelly  : 1927  MRN: [id number]    IMPRESSIONS AND RECOMMENDATIONS: Wale Kelly was referred for a bedside swallow evaluation following admission to [facility] s/p fall, AMS, elevated CK, leukocytosis, elevated lactic acid, abnormal EKG, hypercarbia. Medical hx includes HTN, HLD, dementia, lung mass, AAA. No known history of dysphagia prior to admission. Pt seen for evaluation seated upright in bed, alert, confused, cooperative given cues/redirections. She did not follow all commands to participate in oral mechanism examination; no asymmetry or impairment noted during evaluation. She was presented with PO trials of ice chips, thin liquids via tsp/cup/straw, puree, and regular solids. Oral stage WFL with intact labial seal, mastication, AP transit, and oral clearance. Pharyngeal stage WFL with adequate swallow initiation/laryngeal elevation. No s/s aspiration across all trials. Recommend continued regular diet/thin liquids. Assist with feeding d/t AMS and weakness. No further acute SLP needs identified.       ADMISSION DATE: 2022  ADMITTING DIAGNOSIS: has Rectal bleeding and Fall at home, initial encounter on their problem list.  ONSET DATE: this admission    Recent Chest Xray/CT of Chest: see chart    Date of Eval: 2022  Evaluating Therapist: SUKHDEEP Delgado    Current Diet level:  Current Diet : Regular      Primary Complaint  Patient Complaint: unclear, AMS    Pain:  Does not rate    Reason for Referral  Wale Kelly was referred for a bedside swallow evaluation to assess the efficiency of her swallow function, identify signs and symptoms of aspiration and make recommendations regarding safe dietary consistencies, effective compensatory strategies, and safe eating environment. Impression  Dysphagia Diagnosis: Swallow function appears WFL;No clinical indicators of dysphagia  Dysphagia Outcome Severity Scale: Level 6: Within functional limits/Modified independence     Treatment Plan  Requires SLP Intervention: No  Duration of Treatment: N/A  D/C Recommendations: 24 hour supervision/assistance       Recommended Diet and Intervention        Recommended Form of Meds: PO          Compensatory Swallowing Strategies  Compensatory Swallowing Strategies : Upright as possible for all oral intake;Assist feed    Treatment/Goals  Short-term Goals  Timeframe for Short-term Goals: N/A    General  Chart Reviewed: Yes  Behavior/Cognition: Alert;Confused; Requires cueing  Respiratory Status: Room air  O2 Device: None (Room air)  Communication Observation:  (cognitive communication impairments)  Follows Directions: Simple  Dentition: Adequate  Patient Positioning: Upright in bed  Baseline Vocal Quality: Normal  Prior Dysphagia History: none known prior to admission  Consistencies Administered: Regular;Pureed; Thin - teaspoon; Thin - cup; Thin - straw; Ice Chips           Vision/Hearing  Hearing  Hearing: Within functional limits    Oral Motor Deficits       Oral Phase Dysfunction  Oral Phase  Oral Phase: WFL     Indicators of Pharyngeal Phase Dysfunction        Prognosis  Individuals consulted  Consulted and agree with results and recommendations: Patient    Education  Patient Education: results, recommendations  Patient Education Response: Demonstrated understanding  Safety Devices in place: Yes  Type of devices: Left in bed       Therapy Time  SLP Individual Minutes  Time In: 2972  Time Out: [address] [place] 153  Minutes: [address]  5/23/2022 4:43 PM

## 2022-05-23 NOTE — ED NOTES
Pt [facility]ON[facility] requesting to be called. His phone number is [phone]. Pt primary RN Mat notified.       Grady Mattson RN  05/23/22 4266

## 2022-05-23 NOTE — H&P
History and Physical      Name:  Nam Wiley /Age/Sex: 1927  (80 y.o. female)   MRN & CSN:  [id number] & [id number] Admission Date/Time: 2022 10:59 AM   Location:  [facility]20 PCP: Sangeeta Porter MD       Hospital Day: 1           Assessment and Plan:   # Fall - r/o cardiac, neurologic etiology. Pt found down unknown duration, suspected x 2 days. No acute osseus injury noted on CT and plain films. CT head non acute for acute infarct. Pt with hx Dementia, eval of neuro status limited. Will obtain MRI Brain, NIHSS, TTE, Carotid US, orthostatic vs, repeat trops, monitor on tele r/o arrhythmias, fall precautions, swallow eval, PT/OT, consider Neuro c/s  # Elevated CK in setting immobility as patient was found down at home, IV hydration, trend lactic acid levels  # Leukocytosis - Pt with low grade temp, no infectious source noted on imaging, UA, rapid COVID pending will follow-up, will check pro-gorge, blood cultures ordered, repeat CBC in am.  # Elevated lactic acid -suspect due to low volume status, +heme concentration. IV hydration, trend lactic acids  Levels  # AMS - Hx Dementia, baseline unknown, continue current neuro, cardiac, infectious w/u   # Abnormal EKG - Baseline erratic, will repeat and f/u.  No prior EKG available for comparrison  # Hypercarbia - noted on VBG's in ED - Will repeat blood gas, monitor need for NIV management    Other chronic medical conditions - Resume home medications unless contraindcated  # Essential hypertension -resume current antihypertensives  # Mixed hyperlipidemia -holding statin given elevated CK  # History of lung mass -CT imaging noted for 8 mm right upper lobe pulmonary nodule with recommendations for repeat noncontrast CT in 6 to 12 months outpatient follow-up  # History of dementia on Aricept, supportive care  # History LLE DVT on Xarelto, given hx of falls per EHR will need re-eval for resuming  # History of AAA -3.7cm monitored on outpatient basis  # Severe degenerative changes on CT imaging of thoracic and lumbar spine - MRI suggested, will need f/u      Present on Admission:   Fall at home, initial encounter             Diet ADULT DIET; Regular; Low Fat/Low Chol/High Fiber/2 gm Na   DVT Prophylaxis [] Lovenox, []  Heparin, [] SCDs, [] Ambulation  [x] Long term AC   GI Prophylaxis [] PPI,  [x] H2 Blocker,  [] Carafate,  [] Diet/Tube Feeds   Code Status Full    Disposition Admit to med surg. Patient plans to return home upon discharge         Chief Complaint: Fall      History obtained from EHR  History of Present Illness:   Olena Espino is a 80 y.o. female  with history of AAA, Essential HTN, Hyperlipidemia, Lung mass, DVT on AC, Dementia, hx falls who presents with falls. Information obtained from EHR and [facility] facility. Unable to reach pts personal caregiver at this time. The patient resides in independent living and reportedly is ambulatory and requires no assistance. Today she was found down in the bathroom with her last known well reportedly couple days ago. She was reportedly found in urine and confused; no acute injury noted. She was brought in to ED for evaluation. She presented to ED with low-grade temp pulse 78, respirations 18, blood pressure 170/84, O2 sat 97% on room air. CT of the head, cervical, thoracic and lumbar spine showed no acute acute osseous abnormality, 8 mm right upper lobe subpleural pulmonary nodule noted as well as severe degenerative changes. Chest x-ray was nonacute. Pelvis x-ray was nonacute. EKG showed right bundle branch block with erratic baseline, . Troponin negative x1. . . TSH within normal limits. CBC with 4 WBC 11.8, hemoglobin 17.1 hematocrit 54.5. Chemistry panel showed glucose of 126 otherwise unremarkable. LFTs unremarkable. NR 1.1.  UA is pending. VBG is reviewed. The patient has been admitted for further management.               Ten point ROS: reviewed unable to fully eval given hx demential, confused    Objective:   No intake or output data in the 24 hours ending 05/23/22 1501     Vitals:   Vitals:    05/23/22 1415 05/23/22 1430 05/23/22 1433 05/23/22 1434   BP:  (!) 172/84     Pulse: 74 90     Resp: 16 17     Temp:   99.6 °F (37.6 °C)    TempSrc:   Oral    SpO2: 95% 96%     Weight:    138 lb (62.6 kg)   Height:    5' (1.524 m)       Physical Exam: 05/23/22     GEN -Awake appearing female, in NAD. Appears given age. EYES -anicteric, conjunctiva pink. HENT -Head is normocephalic, atraumatic. MM are moist. No evidence of thrush. NECK -Supple, no apparent thyromegaly or masses. RESP -CTA, no wheezes, rales or rhonchi. Symmetric chest movement   C/V -S1/S2 auscultated. RRR without appreciable M/R/G. No JVD. Cap refill <3 sec. No peripheral edema. GI -Abdomen is soft non distended, non tender to palpation. + BS. No masses or guarding.  -No CVA/ flank tenderness. LYMPH- No petechiae or ecchymoses. MS -No gross joint deformities. SKIN -Normal coloration, warm, dry. NEURO-unable to fully follow commands, hx demential unable to fully eval, minimal verbalization. PSYC-Awake, alert, confused, minimal verbalization. Past Medical History:      Past Medical History:   Diagnosis Date    Hypertension      HPL, AAA, Dementia,   PMH reviewed  Past Surgical  History:    has a past surgical history that includes Colonoscopy (N/A, 1/9/2021). Surgical history reviewed  Family  History:   family history is not on file. Family history reviewed unknown pt with hx dementia  Social History:     Social History     Socioeconomic History    Marital status:       Spouse name: Not on file    Number of children: Not on file    Years of education: Not on file    Highest education level: Not on file   Occupational History    Not on file   Tobacco Use    Smoking status: Current Every Day Smoker     Packs/day: 0.25    Smokeless tobacco: Never Used   Substance and Sexual Activity    Alcohol use: Not on file    Drug use: Not on file    Sexual activity: Not on file   Other Topics Concern    Not on file   Social History Narrative    Not on file     Social Determinants of Health     Financial Resource Strain:     Difficulty of Paying Living Expenses: Not on file   Food Insecurity:     Worried About Running Out of Food in the Last Year: Not on file    Mukul of Food in the Last Year: Not on file   Transportation Needs:     Lack of Transportation (Medical): Not on file    Lack of Transportation (Non-Medical): Not on file   Physical Activity:     Days of Exercise per Week: Not on file    Minutes of Exercise per Session: Not on file   Stress:     Feeling of Stress : Not on file   Social Connections:     Frequency of Communication with Friends and Family: Not on file    Frequency of Social Gatherings with Friends and Family: Not on file    Attends [employer] Services: Not on file    Active Member of [address] [place] [address] or Organizations: Not on file    Attends Club or Organization Meetings: Not on file    Marital Status: Not on file   Intimate Partner Violence:     Fear of Current or Ex-Partner: Not on file    Emotionally Abused: Not on file    Physically Abused: Not on file    Sexually Abused: Not on file   Housing Stability:     Unable to Pay for Housing in the Last Year: Not on file    Number of [place] in the Last Year: Not on file    Unstable Housing in the Last Year: Not on file      reports that she has been smoking. She has been smoking about 0.25 packs per day. She has never used smokeless tobacco.   has no history on file for alcohol use.   has no history on file for drug use. Social history reviewed  Allergies:   No Known Allergies    Home Medications:     Prior to Admission medications    Medication Sig Start Date End Date Taking?  Authorizing Provider   pantoprazole (PROTONIX) 40 MG tablet Take 1 tablet by mouth every morning (before breakfast) 1/12/21   Omar Wong MD   donepezil (ARICEPT) 5 MG tablet TAKE ONE TABLET BY MOUTH EVERY EVENING 10/15/20   Historical Provider, MD   lisinopril (PRINIVIL;ZESTRIL) 40 MG tablet TAKE ONE TABLET BY MOUTH EVERY DAY FOR high blood pressure; may take at night. 5/7/20   Historical Provider, MD   pravastatin (PRAVACHOL) 40 MG tablet TAKE ONE TABLET BY MOUTH AT BEDTIME 9/21/20   Historical Provider, MD   rivaroxaban (XARELTO) 20 MG TABS tablet TAKE ONE TABLET BY MOUTH EVERY DAY with evening meal FOR blood clots 3/9/20   Historical Provider, MD         Medications:   Medications:    miconazole   Topical BID    sodium chloride flush  5-40 mL IntraVENous 2 times per day    famotidine (PEPCID) injection  10 mg IntraVENous BID      Infusions:    sodium chloride      sodium chloride       PRN Meds: sodium chloride flush, 5-40 mL, PRN  sodium chloride, , PRN  ondansetron, 4 mg, Q8H PRN   Or  ondansetron, 4 mg, Q6H PRN  polyethylene glycol, 17 g, Daily PRN  acetaminophen, 650 mg, Q6H PRN   Or  acetaminophen, 650 mg, Q6H PRN        Data:     Laboratory this visit:  Reviewed  Recent Labs     05/23/22  1302   WBC 11.8*   HGB 17.1*   HCT 54.5*         Recent Labs     05/23/22  1302      K 4.3      CO2 26   BUN 10   CREATININE 0.8     Recent Labs     05/23/22  1302   AST 25   ALT 12   BILITOT 1.0   ALKPHOS 99     Recent Labs     05/23/22  1302   INR 1.13         Radiology this visit:  Reviewed. XR PELVIS (1-2 VIEWS)    Result Date: 5/23/2022  EXAMINATION: ONE XRAY VIEW OF THE PELVIS 5/23/2022 11:36 am COMPARISON: None. HISTORY: ORDERING SYSTEM PROVIDED HISTORY: trauma TECHNOLOGIST PROVIDED HISTORY: Reason for exam:->trauma Reason for Exam: trauma Additional signs and symptoms: trauma Relevant Medical/Surgical History: trauma FINDINGS: Single AP view the pelvis reveals SI joints symmetric without widening. No displaced pelvic ring fracture evident.   Mild-to-moderate degenerative changes of the bilateral hips which appear well located without acute fracture     No acute traumatic findings of the pelvis identified     CT HEAD WO CONTRAST    Result Date: 5/23/2022  EXAMINATION: CT OF THE HEAD WITHOUT CONTRAST  5/23/2022 11:56 am TECHNIQUE: CT of the head was performed without the administration of intravenous contrast. Automated exposure control, iterative reconstruction, and/or weight based adjustment of the mA/kV was utilized to reduce the radiation dose to as low as reasonably achievable. COMPARISON: None. HISTORY: ORDERING SYSTEM PROVIDED HISTORY: HEAD TRAUMA, CLOSED, MILD, GCS >= 13, NO RISK FACTORS, NEURO EXAM NORMAL TECHNOLOGIST PROVIDED HISTORY: Has a \"code stroke\" or \"stroke alert\" been called? ->No Reason for exam:->fall/ams Decision Support Exception - unselect if not a suspected or confirmed emergency medical condition->Emergency Medical Condition (MA) Reason for Exam: fall/ams; Head trauma, closed, mild, GCS >= 13, no risk factors, neuro exam normal FINDINGS: BRAIN/VENTRICLES: There are remote bilateral basal ganglia lacune infarcts there is no acute intracranial hemorrhage, mass effect or midline shift. No abnormal extra-axial fluid collection. The gray-white differentiation is maintained without evidence of an acute infarct. There is no evidence of hydrocephalus. ORBITS: The visualized portion of the orbits demonstrate no acute abnormality. SINUSES: The visualized paranasal sinuses and mastoid air cells demonstrate no acute abnormality. SOFT TISSUES/SKULL:  No acute abnormality of the visualized skull or soft tissues. No acute intracranial abnormality. CT CERVICAL SPINE WO CONTRAST    Result Date: 5/23/2022  EXAMINATION: CT OF THE CERVICAL SPINE WITHOUT CONTRAST 5/23/2022 11:56 am TECHNIQUE: CT of the cervical spine was performed without the administration of intravenous contrast. Multiplanar reformatted images are provided for review.  Automated exposure control, iterative reconstruction, subpleural pulmonary nodule. Follow-up as clinically warranted according to the Fleischner criteria below. RECOMMENDATIONS: 8 mm right solid pulmonary nodule within the upper lobe. Recommend a non-contrast Chest CT at 6-12 months. If patient is high risk for malignancy, recommend an additional non-contrast Chest CT at 18-24 months; if patient is low risk for malignancy a non-contrast Chest CT at 18-24 months is optional. These guidelines do not apply to immunocompromised patients and patients with cancer. Follow up in patients with significant comorbidities as clinically warranted. For lung cancer screening, adhere to Lung-RADS guidelines. Reference: Radiology. 2017; 284(1):228-43. CT LUMBAR SPINE WO CONTRAST    Result Date: 5/23/2022  EXAMINATION: CT OF THE LUMBAR SPINE WITHOUT CONTRAST  5/23/2022 TECHNIQUE: CT of the lumbar spine was performed without the administration of intravenous contrast. Multiplanar reformatted images are provided for review. Adjustment of mA and/or kV according to patient size was utilized. Automated exposure control, iterative reconstruction, and/or weight based adjustment of the mA/kV was utilized to reduce the radiation dose to as low as reasonably achievable. COMPARISON: None HISTORY: ORDERING SYSTEM PROVIDED HISTORY: fall TECHNOLOGIST PROVIDED HISTORY: Reason for exam:->fall Decision Support Exception - unselect if not a suspected or confirmed emergency medical condition->Emergency Medical Condition (MA) Reason for Exam: FALL FINDINGS: Partially imaged probable aneurysmal dilatation noted of the aorta but incompletely evaluated There is a dextroconvex scoliosis. Moderate loss of vertebral heights throughout. There is grade 1 retrolisthesis of L2 on L3, L3 on L4 with grade 1 anterolisthesis of L4 on L5. No fracture is identified.   There is severe loss of disc height at T12-L1, L1-2 and L4-5 with severe endplate degenerative changes and facet hypertrophy bilaterally at these levels There is circumferential bulging of the disc with disc osteophyte complex producing severe canal and foraminal stenosis L5-S1, L4-5, L3-4 with moderate canal and foraminal stenosis at L2-3 and L1-2. No acute fracture. Severe degenerative changes. MRI suggested. XR CHEST PORTABLE    Result Date: 5/23/2022  EXAMINATION: ONE XRAY VIEW OF THE CHEST 5/23/2022 11:36 am COMPARISON: None. HISTORY: ORDERING SYSTEM PROVIDED HISTORY: fall TECHNOLOGIST PROVIDED HISTORY: Reason for exam:->fall Reason for Exam: fall Additional signs and symptoms: fall Relevant Medical/Surgical History: fall FINDINGS: Cardiac size borderline enlarged. No overt edema. No pneumothorax or pleural effusion. 2.2 cm nodular focus overlies the right mid lung concerning for pulmonary nodule.      No acute traumatic findings of the chest 2.2 cm right mid lung rounded nodular focus with attention on CT chest recommended for further evaluation to exclude underlying malignancy           EKG this visit:  personally reviewed         Electronically signed by KIKI Chen CNP on 5/23/2022 at 3:01 PM

## 2022-05-23 NOTE — ED PROVIDER NOTES
[facility]      TRIAGE CHIEF COMPLAINT:   Fall      [place]:  Laurine Goodell is a 80 y.o. female that presents by EMS from independent living for fall. Apparently she fell couple days ago she has been on the ground in the bathroom for 2 days they found her laying on the ground. Patient does appear slightly confused she is covered in urine has no complaints at this time no further HPI available. No family or staff present. Again came here for fall confusion. REVIEW OF SYSTEMS:      Review of Systems   Unable to perform ROS: Mental status change       Past Medical History:   Diagnosis Date    Hypertension      Past Surgical History:   Procedure Laterality Date    COLONOSCOPY N/A 1/9/2021    COLONOSCOPY DIAGNOSTIC performed by Mitzy Gr MD at [facility] ENDOSCOPY     No family history on file. Social History     Socioeconomic History    Marital status:      Spouse name: Not on file    Number of children: Not on file    Years of education: Not on file    Highest education level: Not on file   Occupational History    Not on file   Tobacco Use    Smoking status: Current Every Day Smoker     Packs/day: 0.25    Smokeless tobacco: Never Used   Substance and Sexual Activity    Alcohol use: Not on file    Drug use: Not on file    Sexual activity: Not on file   Other Topics Concern    Not on file   Social History Narrative    Not on file     Social Determinants of Health     Financial Resource Strain:     Difficulty of Paying Living Expenses: Not on file   Food Insecurity:     Worried About Running Out of Food in the Last Year: Not on file    Mukul of Food in the Last Year: Not on file   Transportation Needs:     Lack of Transportation (Medical): Not on file    Lack of Transportation (Non-Medical):  Not on file   Physical Activity:     Days of Exercise per Week: Not on file    Minutes of Exercise per Session: Not on file   Stress:     Feeling of Stress : Not on file Social Connections:     Frequency of Communication with Friends and Family: Not on file    Frequency of Social Gatherings with Friends and Family: Not on file    Attends Anglican Services: Not on file    Active Member of Clubs or Organizations: Not on file    Attends Club or Organization Meetings: Not on file    Marital Status: Not on file   Intimate Partner Violence:     Fear of Current or Ex-Partner: Not on file    Emotionally Abused: Not on file    Physically Abused: Not on file    Sexually Abused: Not on file   Housing Stability:     Unable to Pay for Housing in the Last Year: Not on file    Number of [place] in the Last Year: Not on file    Unstable Housing in the Last Year: Not on file     Current Facility-Administered Medications   Medication Dose Route Frequency Provider Last Rate Last Admin    miconazole (MICOTIN) 2 % powder   Topical BID Almer Anis, DO   Given at 05/23/22 1400    0.45 % sodium chloride infusion   IntraVENous Continuous KIKI Reyes - CNP         Current Outpatient Medications   Medication Sig Dispense Refill    pantoprazole (PROTONIX) 40 MG tablet Take 1 tablet by mouth every morning (before breakfast) 90 tablet 1    donepezil (ARICEPT) 5 MG tablet TAKE ONE TABLET BY MOUTH EVERY EVENING      lisinopril (PRINIVIL;ZESTRIL) 40 MG tablet TAKE ONE TABLET BY MOUTH EVERY DAY FOR high blood pressure; may take at night.       pravastatin (PRAVACHOL) 40 MG tablet TAKE ONE TABLET BY MOUTH AT BEDTIME      rivaroxaban (XARELTO) 20 MG TABS tablet TAKE ONE TABLET BY MOUTH EVERY DAY with evening meal FOR blood clots        No Known Allergies  Current Facility-Administered Medications   Medication Dose Route Frequency Provider Last Rate Last Admin    miconazole (MICOTIN) 2 % powder   Topical BID Almer Anis, DO   Given at 05/23/22 1400    0.45 % sodium chloride infusion   IntraVENous Continuous Roberto Wilson APRN - CNP         Current Outpatient Medications   Medication Sig Dispense Refill    pantoprazole (PROTONIX) 40 MG tablet Take 1 tablet by mouth every morning (before breakfast) 90 tablet 1    donepezil (ARICEPT) 5 MG tablet TAKE ONE TABLET BY MOUTH EVERY EVENING      lisinopril (PRINIVIL;ZESTRIL) 40 MG tablet TAKE ONE TABLET BY MOUTH EVERY DAY FOR high blood pressure; may take at night.  pravastatin (PRAVACHOL) 40 MG tablet TAKE ONE TABLET BY MOUTH AT BEDTIME      rivaroxaban (XARELTO) 20 MG TABS tablet TAKE ONE TABLET BY MOUTH EVERY DAY with evening meal FOR blood clots         Nursing Notes Reviewed    VITAL SIGNS:  ED Triage Vitals   Enc Vitals Group      BP       Pulse       Resp       Temp       Temp src       SpO2       Weight       Height       Head Circumference       Peak Flow       Pain Score       Pain Loc       Pain Edu? Excl. in [address]? PHYSICAL EXAM:  Physical Exam  Vitals and nursing note reviewed. Constitutional:       General: She is not in acute distress. Appearance: She is well-developed and well-groomed. She is not ill-appearing, toxic-appearing or diaphoretic. HENT:      Head: Normocephalic and atraumatic. Right Ear: External ear normal.      Left Ear: External ear normal.   Eyes:      General: No scleral icterus. Right eye: No discharge. Left eye: No discharge. Extraocular Movements: Extraocular movements intact. Conjunctiva/sclera: Conjunctivae normal.   Neck:      Vascular: No JVD. Trachea: Phonation normal.   Cardiovascular:      Rate and Rhythm: Normal rate and regular rhythm. Pulses: Normal pulses. Heart sounds: Normal heart sounds. No murmur heard. No friction rub. No gallop. Pulmonary:      Effort: Pulmonary effort is normal. No respiratory distress. Breath sounds: Normal breath sounds. No stridor. No wheezing, rhonchi or rales. Abdominal:      General: Bowel sounds are normal. There is no distension. Palpations: Abdomen is soft.  There is no mass or pulsatile mass. Tenderness: There is no abdominal tenderness. There is no guarding or rebound. Negative signs include Lagunas's sign, Rovsing's sign and McBurney's sign. Hernia: No hernia is present. Musculoskeletal:         General: Tenderness present. No swelling, deformity or signs of injury. Right shoulder: Normal.      Left shoulder: Normal.      Right upper arm: Normal.      Left upper arm: Normal.      Right elbow: Normal.      Left elbow: Normal.      Right forearm: Normal.      Left forearm: Normal.      Right wrist: Normal.      Left wrist: Normal.      Right hand: Normal.      Left hand: Normal.      Cervical back: Normal, full passive range of motion without pain and normal range of motion. No edema, erythema, signs of trauma, rigidity, torticollis, tenderness or crepitus. No pain with movement, spinous process tenderness or muscular tenderness. Normal range of motion. Thoracic back: Normal.      Lumbar back: Normal.      Right hip: Normal.      Left hip: Normal.      Right upper leg: Normal.      Left upper leg: Normal.      Right lower leg: Normal. No edema. Left lower leg: Normal. No edema. Right ankle: Normal.      Right Achilles Tendon: Normal.      Left ankle: Normal.      Left Achilles Tendon: Normal.   Skin:     Coloration: Skin is pale. Skin is not jaundiced. Findings: Rash present. No bruising, erythema or lesion. Comments: Candidiasis to bilateral groin/chest wall   Neurological:      Mental Status: She is alert. She is disoriented and confused. GCS: GCS eye subscore is 4. GCS verbal subscore is 4. GCS motor subscore is 6. Cranial Nerves: Cranial nerves are intact. No cranial nerve deficit, dysarthria or facial asymmetry. Sensory: Sensation is intact. No sensory deficit. Motor: Weakness present. No tremor, atrophy, abnormal muscle tone or seizure activity.       Coordination: Coordination normal.   Psychiatric: Mood and Affect: Mood normal.         Behavior: Behavior normal. Behavior is cooperative. I have reviewed andinterpreted all of the currently available lab results from this visit (if applicable):         Radiographs (if obtained):  [] The following radiograph was interpreted by myself in the absence of a radiologist:    CXR, CT Brain, C/T/L spine, Xray pelvis    EKG (if obtained): (All EKG's are interpreted by myself in the absence of a cardiologist)    12 lead EKG per my interpretation:  Normal Sinus Rhythm 88 bifasicular block  Axis is   Normal  QTc is  440  There is no specific T wave changes appreciated. There is no specific ST wave changes appreciated. Prior EKG to compare with was not available     MDM:    Patient here with complaint of fall again came by EMS from independent living. Apparently she fell couple days ago was found down on the ground covered in urine. She arrives she is slightly confused has no complaints but is covered in urine has yeast infection to her groin, chest wall. No family or staff present. Again from independent living. She has no pain or other complaints. She is otherwise stable we will check labs, imaging we will consult case management. Patient had work-up performed lab so far fairly unremarkable. She does have a pulmonary nodule. Imaging is all negative. Pending urinalysis vital signs are stable I did talk to hospital medicine and will admit patient for confusion, fall, general weakness, awaiting urinalysis results. I did talk to hospital medicine. Again she does have yeast infection to her groins, chest we will give her nystatin. Case management following. Will admit. Stable.     CLINICAL IMPRESSION:  Final diagnoses:   Fall, initial encounter   Altered mental status, unspecified altered mental status type   Pulmonary nodule       (Please note that portions of this note may have been completed with a voice recognition program. Efforts were made to edit the dictations but occasionally words aremis-transcribed.)    DISPOSITION REFERRAL (if applicable):  No follow-up provider specified.     DISPOSITION MEDICATIONS (if applicable):  New Prescriptions    No medications on file          Rakesh Henao, [address] [place] [address],   05/23/22 0167

## 2022-05-24 LAB
ALBUMIN SERPL-MCNC: 3.2 GM/DL (ref 3.4–5)
ALP BLD-CCNC: 73 IU/L (ref 40–129)
ALT SERPL-CCNC: 11 U/L (ref 10–40)
ANION GAP SERPL CALCULATED.3IONS-SCNC: 11 MMOL/L (ref 4–16)
AST SERPL-CCNC: 19 IU/L (ref 15–37)
BACTERIA: NEGATIVE /HPF
BASOPHILS ABSOLUTE: 0 K/CU MM
BASOPHILS RELATIVE PERCENT: 0.3 % (ref 0–1)
BILIRUB SERPL-MCNC: 0.4 MG/DL (ref 0–1)
BILIRUBIN URINE: NEGATIVE MG/DL
BLOOD, URINE: ABNORMAL
BUN BLDV-MCNC: 21 MG/DL (ref 6–23)
CALCIUM SERPL-MCNC: 8.3 MG/DL (ref 8.3–10.6)
CHLORIDE BLD-SCNC: 105 MMOL/L (ref 99–110)
CLARITY: ABNORMAL
CO2: 24 MMOL/L (ref 21–32)
COLOR: YELLOW
CREAT SERPL-MCNC: 1.1 MG/DL (ref 0.6–1.1)
DIFFERENTIAL TYPE: ABNORMAL
EKG ATRIAL RATE: 80 BPM
EKG DIAGNOSIS: NORMAL
EKG P AXIS: 68 DEGREES
EKG P-R INTERVAL: 144 MS
EKG Q-T INTERVAL: 390 MS
EKG QRS DURATION: 132 MS
EKG QTC CALCULATION (BAZETT): 449 MS
EKG R AXIS: -66 DEGREES
EKG T AXIS: -11 DEGREES
EKG VENTRICULAR RATE: 80 BPM
EOSINOPHILS ABSOLUTE: 0 K/CU MM
EOSINOPHILS RELATIVE PERCENT: 0.3 % (ref 0–3)
GFR AFRICAN AMERICAN: 56 ML/MIN/1.73M2
GFR NON-AFRICAN AMERICAN: 46 ML/MIN/1.73M2
GLUCOSE BLD-MCNC: 150 MG/DL (ref 70–99)
GLUCOSE, URINE: NEGATIVE MG/DL
HCT VFR BLD CALC: 42.5 % (ref 37–47)
HEMOGLOBIN: 13.2 GM/DL (ref 12.5–16)
HYALINE CASTS: >20 /LPF
IMMATURE NEUTROPHIL %: 0.3 % (ref 0–0.43)
KETONES, URINE: NEGATIVE MG/DL
LACTATE: 1.3 MMOL/L (ref 0.4–2)
LACTATE: 1.8 MMOL/L (ref 0.4–2)
LEUKOCYTE ESTERASE, URINE: ABNORMAL
LV EF: 55 %
LVEF MODALITY: NORMAL
LYMPHOCYTES ABSOLUTE: 1.2 K/CU MM
LYMPHOCYTES RELATIVE PERCENT: 12.3 % (ref 24–44)
MAGNESIUM: 2.2 MG/DL (ref 1.8–2.4)
MCH RBC QN AUTO: 28.2 PG (ref 27–31)
MCHC RBC AUTO-ENTMCNC: 31.1 % (ref 32–36)
MCV RBC AUTO: 90.8 FL (ref 78–100)
MONOCYTES ABSOLUTE: 1 K/CU MM
MONOCYTES RELATIVE PERCENT: 10.2 % (ref 0–4)
MUCUS: ABNORMAL HPF
NITRITE URINE, QUANTITATIVE: NEGATIVE
NUCLEATED RBC %: 0 %
PDW BLD-RTO: 13.9 % (ref 11.7–14.9)
PH, URINE: 5.5 (ref 5–8)
PLATELET # BLD: 171 K/CU MM (ref 140–440)
PMV BLD AUTO: 10.5 FL (ref 7.5–11.1)
POTASSIUM SERPL-SCNC: 3.5 MMOL/L (ref 3.5–5.1)
PROCALCITONIN: 0.1
PROCALCITONIN: 0.1
PROTEIN UA: 100 MG/DL
RBC # BLD: 4.68 M/CU MM (ref 4.2–5.4)
RBC URINE: 42 /HPF (ref 0–6)
REASON FOR REJECTION: NORMAL
REJECTED TEST: NORMAL
SEGMENTED NEUTROPHILS ABSOLUTE COUNT: 7.2 K/CU MM
SEGMENTED NEUTROPHILS RELATIVE PERCENT: 76.6 % (ref 36–66)
SODIUM BLD-SCNC: 140 MMOL/L (ref 135–145)
SPECIFIC GRAVITY UA: 1.01 (ref 1–1.03)
SQUAMOUS EPITHELIAL: 1 /HPF
TOTAL IMMATURE NEUTOROPHIL: 0.03 K/CU MM
TOTAL NUCLEATED RBC: 0 K/CU MM
TOTAL PROTEIN: 5.7 GM/DL (ref 6.4–8.2)
TRANSITIONAL EPITHELIAL: <1 /HPF
TRICHOMONAS: ABNORMAL /HPF
TROPONIN T: 0.02 NG/ML
TROPONIN T: <0.01 NG/ML
UROBILINOGEN, URINE: 0.2 MG/DL (ref 0.2–1)
WBC # BLD: 9.5 K/CU MM (ref 4–10.5)
WBC CLUMP: ABNORMAL /HPF
WBC UA: 265 /HPF (ref 0–5)

## 2022-05-24 PROCEDURE — 97166 OT EVAL MOD COMPLEX 45 MIN: CPT

## 2022-05-24 PROCEDURE — 2580000003 HC RX 258: Performed by: INTERNAL MEDICINE

## 2022-05-24 PROCEDURE — 83735 ASSAY OF MAGNESIUM: CPT

## 2022-05-24 PROCEDURE — 99223 1ST HOSP IP/OBS HIGH 75: CPT | Performed by: STUDENT IN AN ORGANIZED HEALTH CARE EDUCATION/TRAINING PROGRAM

## 2022-05-24 PROCEDURE — 6370000000 HC RX 637 (ALT 250 FOR IP): Performed by: INTERNAL MEDICINE

## 2022-05-24 PROCEDURE — 99223 1ST HOSP IP/OBS HIGH 75: CPT | Performed by: INTERNAL MEDICINE

## 2022-05-24 PROCEDURE — 94761 N-INVAS EAR/PLS OXIMETRY MLT: CPT

## 2022-05-24 PROCEDURE — 6360000002 HC RX W HCPCS: Performed by: INTERNAL MEDICINE

## 2022-05-24 PROCEDURE — 93306 TTE W/DOPPLER COMPLETE: CPT

## 2022-05-24 PROCEDURE — 87077 CULTURE AEROBIC IDENTIFY: CPT

## 2022-05-24 PROCEDURE — 85025 COMPLETE CBC W/AUTO DIFF WBC: CPT

## 2022-05-24 PROCEDURE — 6370000000 HC RX 637 (ALT 250 FOR IP): Performed by: NURSE PRACTITIONER

## 2022-05-24 PROCEDURE — 97530 THERAPEUTIC ACTIVITIES: CPT

## 2022-05-24 PROCEDURE — 51798 US URINE CAPACITY MEASURE: CPT

## 2022-05-24 PROCEDURE — 81001 URINALYSIS AUTO W/SCOPE: CPT

## 2022-05-24 PROCEDURE — 84484 ASSAY OF TROPONIN QUANT: CPT

## 2022-05-24 PROCEDURE — 1200000000 HC SEMI PRIVATE

## 2022-05-24 PROCEDURE — 80053 COMPREHEN METABOLIC PANEL: CPT

## 2022-05-24 PROCEDURE — 99211 OFF/OP EST MAY X REQ PHY/QHP: CPT

## 2022-05-24 PROCEDURE — 36415 COLL VENOUS BLD VENIPUNCTURE: CPT

## 2022-05-24 PROCEDURE — 2580000003 HC RX 258: Performed by: NURSE PRACTITIONER

## 2022-05-24 PROCEDURE — 84145 PROCALCITONIN (PCT): CPT

## 2022-05-24 PROCEDURE — 93005 ELECTROCARDIOGRAM TRACING: CPT | Performed by: NURSE PRACTITIONER

## 2022-05-24 PROCEDURE — 97535 SELF CARE MNGMENT TRAINING: CPT

## 2022-05-24 PROCEDURE — 87186 SC STD MICRODIL/AGAR DIL: CPT

## 2022-05-24 PROCEDURE — 87086 URINE CULTURE/COLONY COUNT: CPT

## 2022-05-24 PROCEDURE — 93010 ELECTROCARDIOGRAM REPORT: CPT | Performed by: INTERNAL MEDICINE

## 2022-05-24 PROCEDURE — 83605 ASSAY OF LACTIC ACID: CPT

## 2022-05-24 RX ORDER — CLOTRIMAZOLE AND BETAMETHASONE DIPROPIONATE 10; .64 MG/G; MG/G
CREAM TOPICAL 2 TIMES DAILY
Status: DISCONTINUED | OUTPATIENT
Start: 2022-05-24 | End: 2022-06-03 | Stop reason: HOSPADM

## 2022-05-24 RX ADMIN — VANCOMYCIN HYDROCHLORIDE 1000 MG: 1 INJECTION, POWDER, LYOPHILIZED, FOR SOLUTION INTRAVENOUS at 19:55

## 2022-05-24 RX ADMIN — SODIUM CHLORIDE: 4.5 INJECTION, SOLUTION INTRAVENOUS at 08:51

## 2022-05-24 RX ADMIN — PANTOPRAZOLE SODIUM 40 MG: 40 TABLET, DELAYED RELEASE ORAL at 06:38

## 2022-05-24 RX ADMIN — CLOTRIMAZOLE AND BETAMETHASONE DIPROPIONATE: 10; .5 CREAM TOPICAL at 16:10

## 2022-05-24 RX ADMIN — RIVAROXABAN 20 MG: 20 TABLET, FILM COATED ORAL at 18:26

## 2022-05-24 RX ADMIN — DONEPEZIL HYDROCHLORIDE 5 MG: 5 TABLET, FILM COATED ORAL at 21:58

## 2022-05-24 RX ADMIN — LISINOPRIL 40 MG: 20 TABLET ORAL at 07:50

## 2022-05-24 RX ADMIN — CLOTRIMAZOLE AND BETAMETHASONE DIPROPIONATE: 10; .5 CREAM TOPICAL at 21:58

## 2022-05-24 RX ADMIN — CEFEPIME HYDROCHLORIDE 2000 MG: 2 INJECTION, POWDER, FOR SOLUTION INTRAVENOUS at 14:51

## 2022-05-24 RX ADMIN — SODIUM CHLORIDE, PRESERVATIVE FREE 10 ML: 5 INJECTION INTRAVENOUS at 21:59

## 2022-05-24 NOTE — PROGRESS NOTES
Pt given meds whole in applesauce. Pt is a feed. Fed pt whole applesauce. Pt states she is satisfied. Will continue to monitor care.

## 2022-05-24 NOTE — PROGRESS NOTES
Hospitalist Progress Note  Meño Venegas MD      Name:  James Kelley /Age/Sex: 1927  (80 y.o. female)   MRN & CSN:  [id number] & [id number] Admission Date/Time: 2022 10:59 AM   Location:  1044/7962-L PCP: Valentino Porter MD       Hospital Day: 2    Assessment and Plan:   James Kelley is a 80 y.o.  female  who presents with fall    # Fall with loss of consciousness - r/o cardiac, neurologic etiology. MRI negative for acute stroke. Awaiting neuro and cardiology consultation. Consult PT and OT    # Elevated CK in setting immobility as patient was found down at home, IV hydration, trend lactic acid levels. Continue IV fluids. Lactic acidosis resolved  # Leukocytosis -resolved  # Elevated lactic acid -suspect due to low volume status, +heme concentration. IV hydration, trend lactic acids  Levels  # AMS - Hx Dementia, baseline unknown, continue current neuro, cardiac, infectious w/u   # Abnormal EKG -per cardiology  # Hypercarbia -improving     Other chronic medical conditions - Resume home medications unless contraindcated  # Essential hypertension -resume current antihypertensives  # Mixed hyperlipidemia -holding statin given elevated CK  # History of lung mass -CT imaging noted for 8 mm right upper lobe pulmonary nodule with recommendations for repeat noncontrast CT in 6 to 12 months outpatient follow-up  # History of dementia on Aricept, supportive care  # History LLE DVT on Xarelto, given hx of falls per EHR will need re-eval for resuming  # History of AAA -3.7cm monitored on outpatient basis  # Severe degenerative changes on CT imaging of thoracic and lumbar spine - MRI suggested, will need f/u    Body mass index is 22.44 kg/m². Diet ADULT DIET; Regular; Low Fat/Low Chol/High Fiber/2 gm Na   DVT Prophylaxis Lovenox    Code Status Full Code   Disposition To ECF in 1-2 days     Subjective:     Patient was lying comfortable in the bed.   Patient was unable to tell me why and how she fell    Ten point ROS reviewed negative, unless as noted above    Objective: Intake/Output Summary (Last 24 hours) at 5/24/2022 1058  Last data filed at 5/24/2022 6114  Gross per 24 hour   Intake 480 ml   Output --   Net 480 ml        Vitals: BP (!) 127/54   Pulse 81   Temp 98.2 °F (36.8 °C) (Oral)   Resp 18   Ht 5' (1.524 m)   Wt 114 lb 14.4 oz (52.1 kg)   SpO2 92%   BMI 22.44 kg/m²     Physical Exam:     GEN No acute distress.   RESP CTA B  CVS:   RRR  GI/ S/NT/ND BS+   NEURO following some commands  PSYCH awake and alert, following some commands        Medications:   Medications:    miconazole   Topical BID    sodium chloride flush  5-40 mL IntraVENous 2 times per day    donepezil  5 mg Oral QPM    lisinopril  40 mg Oral Daily    pantoprazole  40 mg Oral QAM AC    rivaroxaban  20 mg Oral Daily      Infusions:    sodium chloride 75 mL/hr at 05/24/22 0851    sodium chloride       PRN Meds: sodium chloride flush, 5-40 mL, PRN  sodium chloride, , PRN  ondansetron, 4 mg, Q8H PRN   Or  ondansetron, 4 mg, Q6H PRN  polyethylene glycol, 17 g, Daily PRN  acetaminophen, 650 mg, Q6H PRN   Or  acetaminophen, 650 mg, Q6H PRN          Electronically signed by Lizeth Kingston MD, MD on 5/24/2022 at 10:58 AM

## 2022-05-24 NOTE — PROGRESS NOTES
Rcvd message from carolin Herrmann), r/t pt rcving her MRI. [facility] RN spoke with POA ([phone] - raghav Ramirez) and MRI screening form is completed to best of POA knowledge. In addition, x-ray will call in MRI  To come do MRI as it was ordered STAT. House supervisor is aware of order.

## 2022-05-24 NOTE — CONSULTS
[facility] Nurse  Consult Note       Rolly Preston  AGE: 80 y.o. GENDER: female  : 1927  TODAY'S DATE:  2022    Subjective:     Reason for  Evaluation and Assessment: wound care cy Preston is a 80 y.o. female referred by:   [x] Physician  [] Nursing  [] Other:     Wound Identification:  Wound Type: pressure and moisture  Contributing Factors: chronic pressure, decreased mobility and moisture/rash        PAST MEDICAL HISTORY        Diagnosis Date    Hypertension        PAST SURGICAL HISTORY    Past Surgical History:   Procedure Laterality Date    COLONOSCOPY N/A 2021    COLONOSCOPY DIAGNOSTIC performed by Bryanna Long [facility] at [facility]    No family history on file. SOCIAL HISTORY    Social History     Tobacco Use    Smoking status: Current Every Day Smoker     Packs/day: 0.25    Smokeless tobacco: Never Used   Substance Use Topics    Alcohol use: Not on file    Drug use: Not on file       ALLERGIES    No Known Allergies    MEDICATIONS    No current facility-administered medications on file prior to encounter. Current Outpatient Medications on File Prior to Encounter   Medication Sig Dispense Refill    pantoprazole (PROTONIX) 40 MG tablet Take 1 tablet by mouth every morning (before breakfast) 90 tablet 1    donepezil (ARICEPT) 5 MG tablet TAKE ONE TABLET BY MOUTH EVERY EVENING      lisinopril (PRINIVIL;ZESTRIL) 40 MG tablet TAKE ONE TABLET BY MOUTH EVERY DAY FOR high blood pressure; may take at night.       pravastatin (PRAVACHOL) 40 MG tablet TAKE ONE TABLET BY MOUTH AT BEDTIME      rivaroxaban (XARELTO) 20 MG TABS tablet TAKE ONE TABLET BY MOUTH EVERY DAY with evening meal FOR blood clots           Objective:      BP (!) 127/54   Pulse 81   Temp 98.2 °F (36.8 °C) (Oral)   Resp 18   Ht 5' (1.524 m)   Wt 114 lb 14.4 oz (52.1 kg)   SpO2 92%   BMI 22.44 kg/m²   Jack Risk Score: Jack Scale Score: 16    LABS    CBC:   Lab Results   Component Value Date    WBC 9.5 05/24/2022    RBC 4.68 05/24/2022    HGB 13.2 05/24/2022    HCT 42.5 05/24/2022    MCV 90.8 05/24/2022    MCH 28.2 05/24/2022    MCHC 31.1 05/24/2022    RDW 13.9 05/24/2022     05/24/2022    MPV 10.5 05/24/2022     CMP:    Lab Results   Component Value Date     05/23/2022    K 4.3 05/23/2022     05/23/2022    CO2 26 05/23/2022    BUN 10 05/23/2022    CREATININE 0.8 05/23/2022    GFRAA >60 05/23/2022    LABGLOM >60 05/23/2022    GLUCOSE 126 05/23/2022    PROT 7.9 05/23/2022    LABALBU 4.1 05/23/2022    CALCIUM 9.7 05/23/2022    BILITOT 1.0 05/23/2022    ALKPHOS 99 05/23/2022    AST 25 05/23/2022    ALT 12 05/23/2022     Albumin:    Lab Results   Component Value Date    LABALBU 4.1 05/23/2022     PT/INR:    Lab Results   Component Value Date    PROTIME 14.6 05/23/2022    INR 1.13 05/23/2022     HgBA1c:  No results found for: LABA1C      Assessment:     Patient Active Problem List   Diagnosis    Rectal bleeding    Fall at home, initial encounter       Measurements:  Wound 05/24/22 Sternum (Active)   Wound Image   05/24/22 1030   Wound Etiology Other 05/24/22 1030   Dressing Status New dressing applied 05/24/22 1030   Wound Cleansed Cleansed with saline 05/24/22 1030   Dressing/Treatment Interdry Ag/wicking fabric with Ag 05/24/22 1030   Wound Length (cm) 5 cm 05/24/22 1030   Wound Width (cm) 16 cm 05/24/22 1030   Wound Depth (cm) 0.1 cm 05/24/22 1030   Wound Surface Area (cm^2) 80 cm^2 05/24/22 1030   Wound Volume (cm^3) 8 cm^3 05/24/22 1030   Distance Tunneling (cm) 0 cm 05/24/22 1030   Tunneling Position ___ O'Clock 0 05/24/22 1030   Undermining Starts ___ O'Clock 0 05/24/22 1030   Undermining Ends___ O'Clock 0 05/24/22 1030   Undermining Maxium Distance (cm) 0 05/24/22 1030   Wound Assessment Pink/red 05/24/22 1030   Drainage Amount Small 05/24/22 1030   Drainage Description Serosanguinous 05/24/22 1030   Odor None 05/24/22 1030 Sona-wound Assessment Intact 05/24/22 1030   Margins Defined edges 05/24/22 1030   Wound Thickness Description not for Pressure Injury Partial thickness 05/24/22 1030   Number of days: 0       Wound 05/24/22 Heel Left (Active)   Wound Image   05/24/22 1030   Wound Etiology Deep tissue/Injury 05/24/22 1030   Wound Cleansed Not Cleansed 05/24/22 1030   Dressing/Treatment Open to air 05/24/22 1030   Wound Length (cm) 1 cm 05/24/22 1030   Wound Width (cm) 1.5 cm 05/24/22 1030   Wound Depth (cm) 0 cm 05/24/22 1030   Wound Surface Area (cm^2) 1.5 cm^2 05/24/22 1030   Wound Volume (cm^3) 0 cm^3 05/24/22 1030   Distance Tunneling (cm) 0 cm 05/24/22 1030   Tunneling Position ___ O'Clock 0 05/24/22 1030   Undermining Starts ___ O'Clock 0 05/24/22 1030   Undermining Ends___ O'Clock 0 05/24/22 1030   Undermining Maxium Distance (cm) 0 05/24/22 1030   Wound Assessment Purple/maroon 05/24/22 1030   Drainage Amount None 05/24/22 1030   Odor None 05/24/22 1030   Margins Attached edges 05/24/22 1030   Number of days: 0       Wound 05/24/22 Coccyx (Active)   Wound Image   05/24/22 1030   Wound Etiology Pressure Stage 2 05/24/22 1030   Dressing Status New dressing applied 05/24/22 1030   Wound Cleansed Cleansed with saline 05/24/22 1030   Dressing/Treatment Collagen;Silicone border 09/77/73 1030   Wound Length (cm) 0.5 cm 05/24/22 1030   Wound Width (cm) 1 cm 05/24/22 1030   Wound Depth (cm) 0.1 cm 05/24/22 1030   Wound Surface Area (cm^2) 0.5 cm^2 05/24/22 1030   Wound Volume (cm^3) 0.05 cm^3 05/24/22 1030   Distance Tunneling (cm) 0 cm 05/24/22 1030   Tunneling Position ___ O'Clock 0 05/24/22 1030   Undermining Starts ___ O'Clock 0 05/24/22 1030   Undermining Ends___ O'Clock 0 05/24/22 1030   Undermining Maxium Distance (cm) 0 05/24/22 1030   Wound Assessment Pink/red 05/24/22 1030   Drainage Amount Small 05/24/22 1030   Drainage Description Serosanguinous 05/24/22 1030   Odor None 05/24/22 1030   Sona-wound Assessment Blanchable erythema 05/24/22 1030   Margins Defined edges 05/24/22 1030   Wound Thickness Description not for Pressure Injury Partial thickness 05/24/22 1030   Number of days: 0       Response to treatment:  Well tolerated by patient. Pain Assessment:  Severity:  none  Quality of pain:   Wound Pain Timing/Severity:   Premedicated: no    Plan:     Plan of Care: Wound 05/24/22 Sternum-Dressing/Treatment: Interdry Ag/wicking fabric with Ag  Wound 05/24/22 Heel Left-Dressing/Treatment: Open to air  Wound 05/24/22 Coccyx-Dressing/Treatment: Collagen,Silicone border     Patient in bed agreeable to wound care eval. Pt has rash/dry/excoriated to sternum to under breasts and groins  appears as some patches of psoriasis to skin. Cleansed with NS, measured and pictured, applied DriGO wicking cloth and recommend lotrisone cream also. Sacrum with pressure stage 2, cleansed with NS, measured and pictured, applied a new dressing as above. Pt turned to lt side with pillow support. Left heel deep tissue injury measured and pictured. Rt heel intact and both heels floated. Atmos air pump placed to the bed. Pt has a bruise noted to rt hip. Pt is at mild risk for skin breakdown AEB shey. Follow shey orders. Specialty Bed Required : yes  [] Low Air Loss   [x] Pressure Redistribution  [] Fluid Immersion  [] Bariatric  [] Total Pressure Relief  [] Other:     Discharge Plan:  Placement for patient upon discharge: tbd  Hospice Care: no  Patient appropriate for Outpatient [address]: [facility]    Patient/Caregiver Teaching:  Level of patient/caregiver understanding able to:   Pt voiced understanding. Electronically signed by Jory Fitzgerald.  NELSON Grimes,  on 5/24/2022 at 1:13 PM

## 2022-05-24 NOTE — CARE COORDINATION
Pt lives in the independent living at [facility]. Pt had a fall in her apt. Pt would like to go back to her apt at [facility] at discharge. PT/OT are ordered. Pt may need to go to the skilled unit at [facility]. Need PT/OT evals. Orders in chart. LSW placed a WB for therapy.

## 2022-05-24 NOTE — CONSULTS
INPATIENT CARDIOLOGY CONSULT NOTE         Reason for consultation:  Fall     Referring physician:  Whit Guerra MD     Primary care physician: Kenneth Porter MD      Dear Whit Guerra MD Thank you for the consult    Chief Complaint   Patient presents with    Fall       History of present illness:Kimmie is a 80 y. o.year old who  presents with   Chief Complaint   Patient presents with    Fall     Patient is a pleasant 28-year-old female who is admitted to the hospital status post fall. Patient lives in an assisted living independently. She has baseline mild dementia and does not recall the exact events leading to her fall. She mentions she was in the restroom when she had a fall. Denies remembering being unconscious,  Patient denies any specific dizziness. Denies any chest pain palpitation leading to the fall. She mentions that she was unable to get up from the floor and stayed on the ground for hours. Patient has prior medical history significant for AAA, essential hypertension hyperlipidemia history of DVT, dementia. EKG shows sinus rhythm, right bundle branch block, PACs left posterior fascicle block      Past medical history:    has a past medical history of Hypertension. Past surgical history:   has a past surgical history that includes Colonoscopy (N/A, 1/9/2021). Social History:   reports that she has been smoking. She has been smoking about 0.25 packs per day.  She has never used smokeless tobacco.  Family history:   no family history of CAD, STROKE of DM    No Known Allergies    miconazole (MICOTIN) 2 % powder, BID  0.45 % sodium chloride infusion, Continuous  sodium chloride flush 0.9 % injection 5-40 mL, 2 times per day  sodium chloride flush 0.9 % injection 5-40 mL, PRN  0.9 % sodium chloride infusion, PRN  ondansetron (ZOFRAN-ODT) disintegrating tablet 4 mg, Q8H PRN   Or  ondansetron (ZOFRAN) injection 4 mg, Q6H PRN  polyethylene glycol (GLYCOLAX) packet 17 g, Daily PRN  acetaminophen (TYLENOL) tablet 650 mg, Q6H PRN   Or  acetaminophen (TYLENOL) suppository 650 mg, Q6H PRN  donepezil (ARICEPT) tablet 5 mg, QPM  lisinopril (PRINIVIL;ZESTRIL) tablet 40 mg, Daily  pantoprazole (PROTONIX) tablet 40 mg, QAM AC  rivaroxaban (XARELTO) tablet 20 mg, Daily      Current Facility-Administered Medications   Medication Dose Route Frequency Provider Last Rate Last Admin    miconazole (MICOTIN) 2 % powder   Topical BID Trinna Gram, DO   Given at 05/23/22 1400    0.45 % sodium chloride infusion   IntraVENous Continuous Gurney Push, APRN - CNP 75 mL/hr at 05/24/22 0851 New Bag at 05/24/22 0851    sodium chloride flush 0.9 % injection 5-40 mL  5-40 mL IntraVENous 2 times per day Gurney Push, APRN - CNP        sodium chloride flush 0.9 % injection 5-40 mL  5-40 mL IntraVENous PRN Gurney Push, APRN - CNP   10 mL at 05/23/22 1935    0.9 % sodium chloride infusion   IntraVENous PRN Gurney Push, APRN - CNP        ondansetron (ZOFRAN-ODT) disintegrating tablet 4 mg  4 mg Oral Q8H PRN Gurney Push, APRN - CNP        Or    ondansetron (ZOFRAN) injection 4 mg  4 mg IntraVENous Q6H PRN Gurney Push, APRN - CNP        polyethylene glycol (GLYCOLAX) packet 17 g  17 g Oral Daily PRN Gurney Push, APRN - CNP        acetaminophen (TYLENOL) tablet 650 mg  650 mg Oral Q6H PRN Gurney Push, APRN - CNP        Or    acetaminophen (TYLENOL) suppository 650 mg  650 mg Rectal Q6H PRN Gurney Push, APRN - CNP        donepezil (ARICEPT) tablet 5 mg  5 mg Oral QPM Gurney Push, APRN - CNP   5 mg at 05/23/22 2209    lisinopril (PRINIVIL;ZESTRIL) tablet 40 mg  40 mg Oral Daily Gurney Push, APRN - CNP   40 mg at 05/24/22 0750    pantoprazole (PROTONIX) tablet 40 mg  40 mg Oral QAM AC Gurney Push, APRN - CNP   40 mg at 05/24/22 9333    rivaroxaban (XARELTO) tablet 20 mg  20 mg Oral Daily Dayne Colón, APRN - CNP             Review of Systems: Poor historian however denies any specific syncope. Denies any chest pain shortness of breath palpitations    Physical Examination:      Vitals:    05/24/22 0745   BP: (!) 127/54   Pulse: 81   Resp: 18   Temp: 98.2 °F (36.8 °C)   SpO2: 92%      Wt Readings from Last 3 Encounters:   05/24/22 114 lb 14.4 oz (52.1 kg)   01/10/21 138 lb 14.4 oz (63 kg)     Body mass index is 22.44 kg/m². General Appearance:  No distress, conversant  Constitutional:  Well developed, Well nourished  HEENT:  Normocephalic, Atraumatic, Oropharynx moist   Nose normal. Neck Supple Carotid: no carotid bruit  Eyes:  Conjunctiva normal, No discharge. Respiratory:    Normal breath sounds, No respiratory distress, No wheezing, no use of accessory muscles, diaphragm movement is normal  No chest Tenderness  Cardiovascular: S1-S2 No murmurs auscultated. No rubs, thrills or gallops. Normal  rhythm. Pedal pulses are normal. No pedal edema  GI:  Soft Non tender, non distended. Musculoskeletal:   No tenderness, No cyanosis, No clubbing. Integument:  Warm, Dry, No erythema, No rash. Lymphatic:  No lymphadenopathy noted. Neurologic:  Alert    Psychiatric:  Affect normal, Judgment normal, Mood normal.       Lab Review     Recent Labs     05/24/22  0249   WBC 9.5   HGB 13.2   HCT 42.5         Recent Labs     05/23/22  1302      K 4.3      CO2 26   BUN 10   CREATININE 0.8     Recent Labs     05/23/22  1302   AST 25   ALT 12   BILITOT 1.0   ALKPHOS 99     No results for input(s): TROPONINI in the last 72 hours. No results found for: BNP  Lab Results   Component Value Date    INR 1.13 05/23/2022    PROTIME 14.6 (H) 05/23/2022         All labs, images, EKGs were personally reviewed      Assessment: 80 y. o.year old with PMH of  has a past medical history of Hypertension. Medical Decision Making :       1. Fall ? Mechanical ? Loss of balance  2.  Patient denies any specific loss of consciousness, she has dementia and poor historian  3. ? Lactic acidosis ? Infectious etiology    No ischemic work-up planned at this time and otherwise elderly 80year-old demented patient, as will not contribute to longevity/quality of life. Patient may need PT OT and short-term rehab. Obtain echocardiogram    4. Abnormal EKG: EKG shows sinus rhythm bifascicular block, PACs. 5. Rhabdomyolysis with elevated CK total   6. Elevated troponin: Likely secondary to rhabdomyolysis, currently resolved. Demand ischemia type II MI. No further work-up.       Thank you for the consult    Dr. Trina Desai  5/24/2022 12:37 PM

## 2022-05-24 NOTE — PROGRESS NOTES
Occupational Therapy  [facility] ACUTE CARE OCCUPATIONAL THERAPY EVALUATION    James Kelley, 11/16/1927, 3014/3014-A, 5/24/2022    Discharge Recommendation: Franklyn Gastelum    History:  [place]:  The primary encounter diagnosis was Fall, initial encounter. Diagnoses of Altered mental status, unspecified altered mental status type and Pulmonary nodule were also pertinent to this visit. Subjective:  Patient states: \"I am all the way up on the third floor so come visit me sometime! \"  Pain: Pt denied pain this date  Communication with other providers: Handoff to RN  Restrictions: General Precautions, Fall Risk, IV, Telemetry, Bed/Chair Alarm    Home Setup/Prior level of function:   Additional Comments:Per chart, pt is from [facility]. Pt's social history this date is limited by pt's dementia. Examination:  · Observation: Supine in bed upon arrival, pleasantly confused and agreeable to OT evaluation this date   · [facility]: [facility] (wears glasses)  · Hearing: [facility] [place]  · Vitals: Stable vitals throughout session, oriented to self only    Body Systems and functions:  · ROM: WFL in BL UEs  · Strength: 4-/5 in all major muscle groups of BL UEs   · Sensation: WFL (no numbness or tingling reported)  · Tone: Normal  · Coordination: WFL  · Perception: WNL    Activities of Daily Living (ADLs):  · Feeding: Supervision/set up(packages opened and utensils within reach)  · Grooming: CGA(performed oral hygiene in standing at sink)  · UB bathing: SBA(seated with all necessary items within reach)  · LB bathing: Max A(require assist for cleansing buttocks, brittaney-area, and distal BL LEs)  · UB dressing:SBA(seated with clothes within reach)  · LB dressing:  Mod A(require assist for managing LB garments up to hips in standing, able to doff and don Lt LE sock SBA with increased time)  · Toileting: Dependent(require total assist for clothing mgmt both directions and brittaney-care)    Cognitive and Psychosocial Functioning:  · Overall cognitive status: Impaired, pt oriented to self only this date; pt unable to identify day, month, year and present circumstances; however, pt able to follow commands this date and attend to task  · Affect: Normal     Balance:   · Sitting: SBA static and dynamic sitting  · Standing: CGA with RW    Functional Mobility:  · Bed Mobility: Min A supine to sitting EOB, assist provided for getting legs over the edge of the bed and scooting hips forward, cueing provided to get feet flat on the floor   · Transfers: CGA sit to stand from EOB, CGA stand to sit in chair, cueing provided to reach back for arm rests and controlled descent to chair  · Ambulation: Min A with RW HH distances to/from bathroom    AM-PAC 6 click short form for inpatient daily activity:   How much help from another person does the patient currently need. .. Unable  Dep A Lot  Max A A Lot   Mod A A Little  Min A A Little   CGA  SBA None   Mod I  Indep  Sup   1. Putting on and taking off regular lower body clothing? [] 1    [] 2   [x] 2   [] 3   [] 3   [] 4      2. Bathing (including washing, rinsing, drying)? [] 1   [] 2   [x] 2 [] 3 [] 3 [] 4   3. Toileting, which includes using toilet, bedpan, or urinal? [x] 1    [] 2   [] 2   [] 3   [] 3   [] 4     4. Putting on and taking off regular upper body clothing? [] 1   [] 2   [] 2   [] 3   [x] 3    [] 4      5. Taking care of personal grooming such as brushing teeth? [] 1   [] 2    [] 2 [] 3    [x] 3   [] 4      6. Eating meals? [] 1   [] 2   [] 2   [] 3   [] 3   [x] 4      Raw Score: 15   [24=0% impaired(CH), 23=1-19%(CI), 20-22=20-39%(CJ), 15-19=40-59%(CK), 10-14=60-79%(CL), 7-9=80-99%(CM), 6=100%(CN)]     Treatment:  Therapeutic Activity Training:   Therapeutic activity training was instructed today. Cues were given for safety, sequence, UE/LE placement, awareness, and balance.     Activities performed today included bed mobility training, functional transfer training, functional mobility training,sup-sit, sit-stand, role of OT, importance of OOB/EOB activity, discharge planning. Self Care Training:   Cues were given for safety, sequence, UE/LE placement, visual cues, and balance. Activities performed today included LB dressing, oral hygiene, and hair grooming    Safety Measures: Gait belt used, Left in Chair, Alarm in place, Call light and phone within reach    Assessment:  Pt is a 79 y/o female who  has a past medical history of Hypertension. Pt was admitted due to a fall, leukocytosis and altered mental status. Pt is from [facility] and was independent with ADLs prior to admission. Specifics of living situation were unable to be obtained d/t pt's cognitive impairments. Presently, pt is functioning below baseline and would benefit from continued acute OT services. At discharge, OT recommend SNF to restore function and improve activity tolerance for self-care. Complexity: Moderate  Prognosis: Good  Plan: 2+x/week      Goals:  1. Pt will complete all aspects of bed mobility for EOB/OOB ADLs SBA with good safety awareness. 2. Pt will complete UB/LB bathing CGA with use of adapted techniques PRN. 3. Pt will complete all aspects of LB dressing Min A with use of adapted techniques PRN. 4. Pt will complete all functional transfers to and from bed, chair, toilet, shower chair SBA with good safety awareness. 5. Pt will ambulate HH distance to bathroom for toileting CGA with RW with good safety awareness. 6. Pt will complete all aspects of toileting task Mod A with use of good safety awareness. 7. Pt will complete oral hygiene/grooming routine in standing at sink SBA. 8. Pt will complete ther ex/ther act with focus on functional sitting and standing to improve activity tolerance for self-care.       Time:   Time in: 1555  Time out: 1636  Timed treatment minutes: 26  Total time: 41      Electronically signed by:      Jelena Vann,S/OT    SUNDAR Clifford/IAIN, HANS, [id number]    \"I, the qualified professional, was present and in the room for the entire session. The student participates in the delivery of services when the qualified practitioner is directing the service, making the skilled judgment, and is responsible for the assessment and treatment. \"

## 2022-05-24 NOTE — CONSULTS
Neurology Service Consult Note  [facility]  Patient Name: Laurine Goodell  : 1927        Subjective:   Reason for consult: LOC  Patient seen and examined. Chart reviewed in detail. 80 y.o. -female with PMH presenting to [facility] status post fall at nursing facility. Patient apparently fell 2 days prior to presenting to hospital and was able to follow commands appropriately, now she is  unable to follow commands. Prior work up include CT of head CBC, Shows leukocytosis, CMP, EKG shows atrial fibrillation,UA, Elevated lactic acid, MRI  is also nonacute. Patient also had carotid Doppler which is unremarkable. Patient also found to have a  but patient did lay on the floor for quite some time. Patient has a history of dementia, and lives in an assisted living. On exam, patient is alert to self, and disoriented to time. She tells me that it is 1922, but then asked me is that right. Patient has intermittent periods of disorientation andthoughts as she is able to tell me that she does remember falling and she laid on the floor for quite some time and that she had to call for help. She then proceeds to tell me that she lives at [facility], and in an apartment on the third floor.       Past Medical History:   Diagnosis Date    Hypertension     :   Past Surgical History:   Procedure Laterality Date    COLONOSCOPY N/A 2021    COLONOSCOPY DIAGNOSTIC performed by Mitzy Gr MD at [facility] ENDOSCOPY     Medications:  Scheduled Meds:   clotrimazole-betamethasone   Topical BID    miconazole   Topical BID    sodium chloride flush  5-40 mL IntraVENous 2 times per day    donepezil  5 mg Oral QPM    lisinopril  40 mg Oral Daily    pantoprazole  40 mg Oral QAM AC    rivaroxaban  20 mg Oral Daily     Continuous Infusions:   sodium chloride 75 mL/hr at 22 0851    sodium chloride       PRN Meds:.sodium chloride flush, sodium chloride, ondansetron **OR** ondansetron, polyethylene glycol, acetaminophen **OR** acetaminophen    No Known Allergies  Social History     Socioeconomic History    Marital status:      Spouse name: Not on file    Number of children: Not on file    Years of education: Not on file    Highest education level: Not on file   Occupational History    Not on file   Tobacco Use    Smoking status: Current Every Day Smoker     Packs/day: 0.25    Smokeless tobacco: Never Used   Substance and Sexual Activity    Alcohol use: Not on file    Drug use: Not on file    Sexual activity: Not on file   Other Topics Concern    Not on file   Social History Narrative    Not on file     Social Determinants of Health     Financial Resource Strain:     Difficulty of Paying Living Expenses: Not on file   Food Insecurity:     Worried About Running Out of Food in the Last Year: Not on file    Mukul of Food in the Last Year: Not on file   Transportation Needs:     Lack of Transportation (Medical): Not on file    Lack of Transportation (Non-Medical):  Not on file   Physical Activity:     Days of Exercise per Week: Not on file    Minutes of Exercise per Session: Not on file   Stress:     Feeling of Stress : Not on file   Social Connections:     Frequency of Communication with Friends and Family: Not on file    Frequency of Social Gatherings with Friends and Family: Not on file    Attends Bahai Services: Not on file    Active Member of [address] [place] [address] or Organizations: Not on file    Attends Club or Organization Meetings: Not on file    Marital Status: Not on file   Intimate Partner Violence:     Fear of Current or Ex-Partner: Not on file    Emotionally Abused: Not on file    Physically Abused: Not on file    Sexually Abused: Not on file   Housing Stability:     Unable to Pay for Housing in the Last Year: Not on file    Number of [place] in the Last Year: Not on file    Unstable Housing in the Last Year: Not on file      No family history on file. ROS (10 systems)  Unable to assess ROS questions secondary to patient's intermittent confusion    Physical Exam:       Vitals:    05/24/22 0214 05/24/22 0630 05/24/22 0638 05/24/22 0745   BP:   112/74 (!) 127/54   Pulse: 97 74 96 81   Resp: 19 16 20 18   Temp:    98.2 °F (36.8 °C)   TempSrc:    Oral   SpO2:    92%   Weight:       Height:           Wt Readings from Last 3 Encounters:   05/24/22 114 lb 14.4 oz (52.1 kg)   01/10/21 138 lb 14.4 oz (63 kg)     Temp Readings from Last 3 Encounters:   05/24/22 98.2 °F (36.8 °C) (Oral)   01/12/21 98.1 °F (36.7 °C) (Oral)     BP Readings from Last 3 Encounters:   05/24/22 (!) 127/54   01/12/21 (!) 149/80   01/09/21 (!) 135/103     Pulse Readings from Last 3 Encounters:   05/24/22 81   01/12/21 82        Gen: A&O x 1, NAD, cooperative  HEENT: NC/AT, EOMI, PERRL, mmm, neck supple, no meningeal signs; Heart: SR  Lungs: Respirations Unlabored  Ext: no edema, no calf tenderness b/l  Psych: normal mood and affect  Skin: no rashes or lesions    NEUROLOGIC EXAM:    Mental Status: A&O to self, NAD, speech clear, language fluent, repetition and naming intact, follows commands appropriately    Cranial Nerve Exam:   CN II-XII: PERRL, VFF, no nystagmus, no gaze paresis, sensation V1-V3 intact b/l, muscles of facial expression symmetric; hearing intact to conversational tone, palate elevates symmetrically, shoulder elevation symmetric and tongue protrudes midline with movement side to side.     Motor Exam:       Strength 5/5 UE's/LE's b/l  Tone and bulk normal   No pronator drift    Deep Tendon Reflexes: 2/4 biceps, triceps, brachioradialis, patellar, and achilles b/l; flexor plantar responses b/l    Sensation: Intact light touch/vibration UE's/LE's b/l    Coordination/Cerebellum:       Tremors--none      Rapidly alternating movements: no dysdiadochokinesia b/l                Heel-to-Shin: no dysmetria b/l      Finger-to-Nose: no dysmetria b/l    Gait and stance:      Gait: deferred      LABS:        CBC:   Recent Labs     05/24/22  0249   WBC 9.5   RBC 4.68   HGB 13.2   HCT 42.5      MCV 90.8     BMP:    Recent Labs     05/23/22  1302      K 4.3      CO2 26   BUN 10   CREATININE 0.8   GLUCOSE 126*   CALCIUM 9.7       IMAGING:    CTH     Impression   No acute intracranial abnormality.           Carotid doppler      Impression   The right internal carotid artery demonstrates 0-50% stenosis.       The left internal carotid artery demonstrates 0-50% stenosis.       Bilateral vertebral arteries are patent with flow in the normal direction.           MRI brain     Impression   No acute intracranial abnormality.       Minor sinus mucosal disease and minor left mastoid mucoperiosteal thickening   of doubtful clinical significance.             Above imaging personally reviewed in detail. ASSESSMENT/PLAN:     80 y.o. -female with PMH presenting to [facility] status post fall at nursing facility. Neurology consulted to evaluate for possible seizure and altered mental status. 1. Toxic metabolic encephalopathy  -Secondary to urosepsis as UA with WBC of >200, BCx Positive x4  --Further management of infection per IM  -Anticipate that patient's mentation will improve with treatment of infection  -Low suspicion for seizure as etiology for fall  --Recommend PT/OT/ST for the recommendations    Neurodiagnostics  --CT as above  --Carotid doppler as above  --MRI brain nonacute  --EEG ordered    Patient discussed with attending physician Dr. Adalgisa Garcia    Thank you for allowing us to participate in the care of your patient. If there are any questions regarding evaluation please feel free to contact us.      (Please note that portions of this note were completed with a voice recognition program.  Efforts were made to edit the dictations but occasionally words are mistranscribed.)      Luba Barrios, APRN - CNP, 5/24/2022    ------------------------------------    Attending Note:  I have rounded on this patient with Luba Barrios CNP. I have reviewed the chart and we have discussed this case in detail. The patient was seen and examined by myself. Pertinent labs and imaging have been personally reviewed. Our findings and impressions were discussed with the patient. I concur with the Nurse Practitioner's assessment and plan. Patient seen and evaluated at bedside this afternoon. She is alert and oriented to self only. She is following simple commands for me on examination. I have low concentrical suspicion that this represents seizure as etiology for her fall. Do suspect that her presentation is secondary to a toxic encephalopathy from underlying urosepsis. Nonetheless, will obtain EEG. MRI brain reviewed and nonacute. Further treatment of infection per internal medicine. I do anticipate that patient's cognition will continue to improve with treatment of her underlying infection. If EEG is negative, neurology will sign off.     Quan Zimmer DO 5/24/2022 8:32 PM

## 2022-05-24 NOTE — PROGRESS NOTES
Rcvd call from MRI, states they normally do MRI the next morning even if STAT, requested to know who was ordering MD.  Information provided, also requested to be transferred to house supervisor, provided number of house sup and transferred ([id number])

## 2022-05-25 LAB
ANION GAP SERPL CALCULATED.3IONS-SCNC: 12 MMOL/L (ref 4–16)
BUN BLDV-MCNC: 18 MG/DL (ref 6–23)
CALCIUM SERPL-MCNC: 8.5 MG/DL (ref 8.3–10.6)
CHLORIDE BLD-SCNC: 109 MMOL/L (ref 99–110)
CO2: 22 MMOL/L (ref 21–32)
CREAT SERPL-MCNC: 1 MG/DL (ref 0.6–1.1)
GFR AFRICAN AMERICAN: >60 ML/MIN/1.73M2
GFR NON-AFRICAN AMERICAN: 52 ML/MIN/1.73M2
GLUCOSE BLD-MCNC: 110 MG/DL (ref 70–99)
HIGH SENSITIVE C-REACTIVE PROTEIN: 14.4 MG/L
LACTATE: 1.8 MMOL/L (ref 0.4–2)
LACTATE: 2.1 MMOL/L (ref 0.4–2)
POTASSIUM SERPL-SCNC: 3.5 MMOL/L (ref 3.5–5.1)
PROCALCITONIN: 0.08
SODIUM BLD-SCNC: 143 MMOL/L (ref 135–145)
TOTAL CK: 151 IU/L (ref 26–140)

## 2022-05-25 PROCEDURE — 6370000000 HC RX 637 (ALT 250 FOR IP): Performed by: NURSE PRACTITIONER

## 2022-05-25 PROCEDURE — 97535 SELF CARE MNGMENT TRAINING: CPT

## 2022-05-25 PROCEDURE — 95819 EEG AWAKE AND ASLEEP: CPT | Performed by: STUDENT IN AN ORGANIZED HEALTH CARE EDUCATION/TRAINING PROGRAM

## 2022-05-25 PROCEDURE — 2500000003 HC RX 250 WO HCPCS: Performed by: EMERGENCY MEDICINE

## 2022-05-25 PROCEDURE — 87040 BLOOD CULTURE FOR BACTERIA: CPT

## 2022-05-25 PROCEDURE — 97530 THERAPEUTIC ACTIVITIES: CPT

## 2022-05-25 PROCEDURE — 82550 ASSAY OF CK (CPK): CPT

## 2022-05-25 PROCEDURE — 86141 C-REACTIVE PROTEIN HS: CPT

## 2022-05-25 PROCEDURE — 99233 SBSQ HOSP IP/OBS HIGH 50: CPT | Performed by: INTERNAL MEDICINE

## 2022-05-25 PROCEDURE — 2580000003 HC RX 258: Performed by: NURSE PRACTITIONER

## 2022-05-25 PROCEDURE — 2500000003 HC RX 250 WO HCPCS: Performed by: INTERNAL MEDICINE

## 2022-05-25 PROCEDURE — 80048 BASIC METABOLIC PNL TOTAL CA: CPT

## 2022-05-25 PROCEDURE — 6360000002 HC RX W HCPCS: Performed by: INTERNAL MEDICINE

## 2022-05-25 PROCEDURE — 84145 PROCALCITONIN (PCT): CPT

## 2022-05-25 PROCEDURE — 95819 EEG AWAKE AND ASLEEP: CPT

## 2022-05-25 PROCEDURE — 36415 COLL VENOUS BLD VENIPUNCTURE: CPT

## 2022-05-25 PROCEDURE — 83605 ASSAY OF LACTIC ACID: CPT

## 2022-05-25 PROCEDURE — 1200000000 HC SEMI PRIVATE

## 2022-05-25 PROCEDURE — 97162 PT EVAL MOD COMPLEX 30 MIN: CPT

## 2022-05-25 PROCEDURE — 95816 EEG AWAKE AND DROWSY: CPT | Performed by: STUDENT IN AN ORGANIZED HEALTH CARE EDUCATION/TRAINING PROGRAM

## 2022-05-25 PROCEDURE — APPSS45 APP SPLIT SHARED TIME 31-45 MINUTES: Performed by: NURSE PRACTITIONER

## 2022-05-25 PROCEDURE — 97116 GAIT TRAINING THERAPY: CPT

## 2022-05-25 PROCEDURE — 99223 1ST HOSP IP/OBS HIGH 75: CPT | Performed by: INTERNAL MEDICINE

## 2022-05-25 PROCEDURE — 94761 N-INVAS EAR/PLS OXIMETRY MLT: CPT

## 2022-05-25 PROCEDURE — 87081 CULTURE SCREEN ONLY: CPT

## 2022-05-25 PROCEDURE — 2580000003 HC RX 258: Performed by: INTERNAL MEDICINE

## 2022-05-25 RX ORDER — ENOXAPARIN SODIUM 100 MG/ML
40 INJECTION SUBCUTANEOUS DAILY
Status: DISCONTINUED | OUTPATIENT
Start: 2022-05-25 | End: 2022-05-25

## 2022-05-25 RX ADMIN — CEFEPIME HYDROCHLORIDE 2000 MG: 2 INJECTION, POWDER, FOR SOLUTION INTRAVENOUS at 15:25

## 2022-05-25 RX ADMIN — SODIUM CHLORIDE, PRESERVATIVE FREE 10 ML: 5 INJECTION INTRAVENOUS at 21:05

## 2022-05-25 RX ADMIN — DONEPEZIL HYDROCHLORIDE 5 MG: 5 TABLET, FILM COATED ORAL at 21:05

## 2022-05-25 RX ADMIN — SODIUM CHLORIDE, PRESERVATIVE FREE 10 ML: 5 INJECTION INTRAVENOUS at 08:30

## 2022-05-25 RX ADMIN — MICONAZOLE NITRATE: 2 POWDER TOPICAL at 21:05

## 2022-05-25 RX ADMIN — CLOTRIMAZOLE AND BETAMETHASONE DIPROPIONATE: 10; .5 CREAM TOPICAL at 08:28

## 2022-05-25 RX ADMIN — CLOTRIMAZOLE AND BETAMETHASONE DIPROPIONATE: 10; .5 CREAM TOPICAL at 21:06

## 2022-05-25 RX ADMIN — PANTOPRAZOLE SODIUM 40 MG: 40 TABLET, DELAYED RELEASE ORAL at 05:48

## 2022-05-25 RX ADMIN — DEXTROSE MONOHYDRATE 750 MG: 50 INJECTION, SOLUTION INTRAVENOUS at 22:06

## 2022-05-25 RX ADMIN — MICONAZOLE NITRATE: 2 POWDER TOPICAL at 08:30

## 2022-05-25 RX ADMIN — SODIUM CHLORIDE 25 ML/HR: 9 INJECTION, SOLUTION INTRAVENOUS at 03:09

## 2022-05-25 RX ADMIN — LISINOPRIL 40 MG: 20 TABLET ORAL at 08:37

## 2022-05-25 RX ADMIN — RIVAROXABAN 20 MG: 20 TABLET, FILM COATED ORAL at 17:59

## 2022-05-25 RX ADMIN — CEFEPIME HYDROCHLORIDE 2000 MG: 2 INJECTION, POWDER, FOR SOLUTION INTRAVENOUS at 03:10

## 2022-05-25 NOTE — CARE COORDINATION
LSW spoke with pt regarding her discharge plans. Pt lives at [address] [place] [address]. Pt stated that he has a beautiful apt and [place]. Pt stated she goes to the dining room to eat her meals. LSW went over PT/OT recommendations of her going to a SNF for rehab. Pt stated she would rather go to her apt. Pt stated she would go to the skilled unit at [facility] if she had to go. LSW offered to call her family and talk with them about her going to the skilled unit. Pt stated she did not want me to call. LSW offered to call [facility] and have them look over pt info and see if they feel pt should go to the skilled unit or return to [place]. Pt agreed to this. LSW called Wil and left her a message asking her to look over PT/Ot notes and get back with this LSW. Pt has [employer] and will need precert. Waiting on a return call from [place] with [facility].

## 2022-05-25 NOTE — PROCEDURES
ROUTINE ELECTROENCEPHALOGRAM    Identifying Information:  Name: Mansi Perez  MRN: [id number]  : 1927  Interpreting Physician: Noreen Charles DO  Referring Provider: Mary Koehler DO  Date of EE22   Procedure Location: Inpatient      Clinical History:  Mansi Perez is a 80 y.o. female with concerns for seizure like activity. Current Medications:    rivaroxaban  20 mg Oral Daily    clotrimazole-betamethasone   Topical BID    cefepime  2,000 mg IntraVENous Q12H    vancomycin  750 mg IntraVENous Q24H    miconazole   Topical BID    sodium chloride flush  5-40 mL IntraVENous 2 times per day    donepezil  5 mg Oral QPM    lisinopril  40 mg Oral Daily    pantoprazole  40 mg Oral QAM AC        Indication:  Rule out seizure/seizure disorder     Technical Summary:  28 channels of EEG were recorded in a digital format on a patient who is reported to be awake and drowsy during the recording. The patient was not sleep deprived prior to the EEG. The background consists of 6-7 Hz activity in the theta frequency range. It is symmetric, normal voltage and continuous. Posterior dominant rhythm (PDR) is present at times, but poorly formed and it is reactive to stimulation. Photic stimulation was performed and did not produce any abnormalities. During the recording stage II sleep  was not seen. The EKG lead revealed no rhythm abnormalties. EEG Interpretation:   The EEG was abnormal due to the presence of:    Mild to moderate generalized slowing. This is a non-specific finding but is consistent with a generalized disturbance of cerebral function. It may be seen in a variety of conditions, such as toxic, metabolic, post-anoxic, multi-focal or diffuse structural abnormalities.  No electrographic seizures or non-convulsive status epilepticus was seen over the entire monitoring period. Clinical correlation recommended.      Noreen Chalres DO Epileptologist  5/25/2022 6:25 PM

## 2022-05-25 NOTE — PROGRESS NOTES
Intake 480 ml   Output 150 ml   Net 330 ml        Vitals: /69   Pulse 95   Temp 98.1 °F (36.7 °C) (Oral)   Resp 16   Ht 5' (1.524 m)   Wt 124 lb 11.2 oz (56.6 kg)   SpO2 94%   BMI 24.35 kg/m²     Physical Exam:     GEN No acute distress. HEENT hard on hearing  RESP CTA B  CVS:   RRR  GI/ S/NT/ND BS+   NEURO no focal deficit  PSYCH Awake, alert, oriented x3.     Medications:   Medications:    clotrimazole-betamethasone   Topical BID    cefepime  2,000 mg IntraVENous Q12H    vancomycin  750 mg IntraVENous Q24H    miconazole   Topical BID    sodium chloride flush  5-40 mL IntraVENous 2 times per day    donepezil  5 mg Oral QPM    lisinopril  40 mg Oral Daily    pantoprazole  40 mg Oral QAM AC    rivaroxaban  20 mg Oral Daily      Infusions:    sodium chloride 25 mL/hr (05/25/22 0309)     PRN Meds: sodium chloride flush, 5-40 mL, PRN  sodium chloride, , PRN  ondansetron, 4 mg, Q8H PRN   Or  ondansetron, 4 mg, Q6H PRN  polyethylene glycol, 17 g, Daily PRN  acetaminophen, 650 mg, Q6H PRN   Or  acetaminophen, 650 mg, Q6H PRN        Electronically signed by Tiffany Claudio MD, MD on 5/25/2022 at 10:38 AM

## 2022-05-25 NOTE — PROGRESS NOTES
[facility] [place]  [address], [address] [facility],Third Floor  Phone: [phone]    Fax [phone]                  Marino Navarro MD, Robby Terry MD, Dami Tolentino MD, MD Erik Gerard MD Paulett Kohler, MD Ronie Euler, MD Minerva Rudd, APRN      Aaron Calero, APRN  Mervat Galicia, APRN    Ricardo Lin, APRN  Elizabeth Cline PA-C     Cardiology Progress Note     Today's Plan: sign off    Admit Date:  2022    Consult reason/ Seen today for: elevated troponin    Subjective and  Overnight Events:  Patient is very [address]. She does not understand explanations. Chest pain no  Shortness of breath no  Palpitations no  Dizziness no  Swelling no        Telemetry Reviewed:   Sinus    ECHO :   Echocardiogram 2022  Summary   Left ventricular systolic function is normal.   Ejection fraction is visually estimated at 55%. Heavily calcified aortic valve with moderate aortic stenosis; mean P   mmHG. ALEKSANDER: 1.06 cm sq. No evidence of any pericardial effusion. History of Presenting Illness:    Chief complain on admission : 80 y. o.year old who is admitted for  Chief Complaint   Patient presents with   Raine Sanford        Past medical history:    has a past medical history of Hypertension. Past surgical history:   has a past surgical history that includes Colonoscopy (N/A, 2021). Social History:   reports that she has been smoking. She has been smoking about 0.25 packs per day. She has never used smokeless tobacco.  Family history:  family history is not on file.     No Known Allergies    Review of Systems   All 14 systems were reviewed and are negative  Except for the positive findings  which as documented     /69   Pulse 95   Temp 98.1 °F (36.7 °C) (Oral)   Resp 16   Ht 5' (1.524 m)   Wt 124 lb 11.2 oz (56.6 kg)   SpO2 94%   BMI 24.35 kg/m²       Intake/Output Summary (Last 24 hours) at 2022 1229  Last data filed at 5/24/2022 1720  Gross per 24 hour   Intake 480 ml   Output 150 ml   Net 330 ml       Physical Exam  Vitals reviewed. Constitutional:       General: She is not in acute distress. Appearance: Normal appearance. She is not ill-appearing. HENT:      Head: Atraumatic. Neck:      Vascular: No carotid bruit. Cardiovascular:      Rate and Rhythm: Normal rate and regular rhythm. Pulses: Normal pulses. Heart sounds: Murmur heard. Systolic murmur is present with a grade of 2/6. Pulmonary:      Effort: Pulmonary effort is normal. No respiratory distress. Breath sounds: Normal breath sounds. Musculoskeletal:         General: No swelling or deformity. Cervical back: Neck supple. No muscular tenderness. Neurological:      Mental Status: She is alert.              Medications:    clotrimazole-betamethasone   Topical BID    cefepime  2,000 mg IntraVENous Q12H    vancomycin  750 mg IntraVENous Q24H    miconazole   Topical BID    sodium chloride flush  5-40 mL IntraVENous 2 times per day    donepezil  5 mg Oral QPM    lisinopril  40 mg Oral Daily    pantoprazole  40 mg Oral QAM AC    rivaroxaban  20 mg Oral Daily      sodium chloride 25 mL/hr (05/25/22 0309)     sodium chloride flush, sodium chloride, ondansetron **OR** ondansetron, polyethylene glycol, acetaminophen **OR** acetaminophen    Lab Data:  CBC:   Recent Labs     05/23/22  1302 05/24/22 0249   WBC 11.8* 9.5   HGB 17.1* 13.2   HCT 54.5* 42.5   MCV 90.4 90.8    171     BMP:   Recent Labs     05/23/22  1302 05/24/22  2107 05/25/22  0943    140 143   K 4.3 3.5 3.5    105 109   CO2 26 24 22   BUN 10 21 18   CREATININE 0.8 1.1 1.0     PT/INR:   Recent Labs     05/23/22  1302   PROTIME 14.6*   INR 1.13     BNP:    Recent Labs     05/23/22  1302   PROBNP 866.6*     TROPONIN:   Recent Labs     05/23/22  2037 05/24/22  0249 05/24/22 2107   TROPONINT 0.015* <0.010 0.016*               All labs, medications and tests reviewed by myself , continue all other medications of all above medical condition listed as is except for changes mentioned above. Thank you very much for consult , please call with questions. Electronically signed by KIKI Kidd CNP on 5/25/2022 at 12:29 PM           CARDIOLOGY ATTENDING ADDENDUM    I have seen, spoken to and examined this patient personally, independent of the NP/PAC. I have reviewed the hospital care given to date and reviewed all pertinent labs and imaging. I have spoken with patient, nursing staff and provided written and verbal instructions . The above note has been reviewed. I have spent substantive amount of time in formulating patient care. Physical Exam:    General:   Awake, alert  Head:normal  Eye:normal  Chest:   Clear to auscultation  0 Basilar crackles   Cardiovascular:  S1S2 Abdomen: soft   Extremities:   0 edema  Pulses; palpable      MEDICAL DECISION MAKING :             1. Fall ? Mechanical ? Loss of balance  2. Patient denies any specific loss of consciousness, she has dementia and poor historian  3. ? Lactic acidosis ? Infectious etiology  4. Moderate aortic stenosis     No ischemic work-up planned at this time and otherwise elderly 80year-old demented patient, as will not contribute to longevity/quality of life. Patient may need PT OT and short-term rehab.  echocardiogram shows moderate aortic stenosis with mean gradient of 11 mmHg aortic valve area of 1.06. No indication for valve replacement currently.     4. Abnormal EKG: EKG shows sinus rhythm bifascicular block, PACs. 5. Rhabdomyolysis with elevated CK total   6. Elevated troponin: Likely secondary to rhabdomyolysis, currently resolved. Demand ischemia type II MI.   No further work-up.     Please call us with any questions             Dr. Aquiles Schilling MD

## 2022-05-25 NOTE — CONSULTS
[address] 1927, 3014/3014-A, 2022    History  [place]:  The primary encounter diagnosis was Fall, initial encounter. Diagnoses of Altered mental status, unspecified altered mental status type and Pulmonary nodule were also pertinent to this visit. Patient  has a past medical history of Hypertension. Patient  has a past surgical history that includes Colonoscopy (N/A, 2021). Subjective:  Patient states:  \"I don't know if I can move my legs but I'll try. \"    Pain:  Denies pain. Communication with other providers:  Handoff to RN, OT  Restrictions: general precautions, fall risk    Home Setup/Prior level of function   Pt poor historian this date. Per chart review, pt from [facility] and was independent for all ADLs and ambulation (unsure of AD use) prior to admission. Examination of body systems (includes body structures/functions, activity/participation limitations):  · Observation:  Pt up in chair upon arrival and agreeable to therapy  · Vision:  Wears glasses  · Hearing:  [place]  · Cardiopulmonary:  No O2 needs  · Cognition: impaired- oriented to being in a hospital, not oriented to , city, or date, see OT/SLP note for further evaluation. Musculoskeletal  · ROM R/L:  WFL. · Strength R/L:  4/5, minimal impairment in function and endurance. · Neuro:  [place]      Mobility:  · Rolling L/R:  NT, pt up in chair at beginning and end of session  · Supine to sit:  NT, pt up in chair at beginning and end of session  · Transfers: Pt completed STS to/from chair x2 CGA and to/from commode CGA. Cues provided for hand placement for all transfers  · Sitting balance:  good. · Standing balance:  Fair, pt stood at sink ~3 minutes while performing ADLs (see OT note) CGA with no LOB throughout. · Gait: pt ambulated 28' + 28' + 13' with RW with CGA and chair follow for safety.  Seated rest breaks provided in between bouts due to fatigue. Pt ambulated with decreased yessenia, forward flexed posture, and narrow MISAEL. Cues provided for standing posture and pathway negotiation throughout. Pt with complaints of pain in L heel- assessed- pt heel shiny red with what appears to be a blood blister. [facility] 6 Clicks Inpatient Mobility:  AM-PAC Inpatient Mobility Raw Score : 17    Safety: patient left in chair with alarm on, call light within reach, RN notified, gait belt used. Assessment:  Pt is a 80 y.o. female admitted to the hospital after a fall. Pt is typically at independent living, unsure if utilizes AD or not. Pt is currently performing transfers CGA and ambulating 35' with RW CGA. Pt is presenting with decreased endurance, impaired balance, impaired transfers, impaired gait, decreased strength. Pt would benefit from continued acute care PT as well as SNF placement to continue to address impairments. Pt would also benefit from transition to AL after SNF stay to increase safety. Complexity: moderate    Prognosis: Good, no significant barriers to participation at this time.      Plan: 3-5 times per week/week     Equipment: TBD at next level of care    Goals:  Short Term Goals  Time Frame for Short term goals: 1 week  Short term goal 1: pt to complete all bed mobility with supervision  Short term goal 2: Pt to complete all STS to/from bed, commode, and chair SBA  Short term goal 3: Pt to ambulate 150' with LRAD SBA       Treatment plan:  Bed mobility, transfers, balance, gait, TA, TX    Recommendations for NURSING mobility: amb with gait belt and RW    Time:    Time in: 1012  Time out: 1045  Timed treatment minutes: 23  Total time: 33    Electronically signed by:    Clari Anna PT  5/25/2022, 11:56 AM

## 2022-05-25 NOTE — PROGRESS NOTES
Comprehensive Nutrition Assessment    Type and Reason for Visit:  Initial,Positive Nutrition Screen    Nutrition Recommendations/Plan:   1. Continue cardiac diet   2. Add with pt meal set up   3. Please document all PO intakes   4. Encourage adequate hydration and PO intakes      Nutrition Assessment:    Admitted with fall, baseline dementia. H/o hypertension. Pt on cardiac diet, consuming % of meals. Reduced po intake, consistent to advanced age. Needs feeding assistance during stay. Able to feed self at visit. Mild weight loss x 1 yr, not clinically significant. No questions over nutrition. No wounds. Pt denied oral nutrition supplements at this time. Follow as low nutrition risk at this time. Nutrition Related Findings:    Glu 150 Wound Type: None       Current Nutrition Intake & Therapies:    Average Meal Intake: %  Average Supplements Intake: Unable to assess  ADULT DIET; Regular; Low Fat/Low Chol/High Fiber/2 gm Na    Anthropometric Measures:  Height: 5' (152.4 cm)  Ideal Body Weight (IBW): 100 lbs (45 kg)    Admission Body Weight: 114 lb 14.4 oz (52.1 kg) (first measured weight)  Current Body Weight: 124 lb 12.5 oz (56.6 kg), 124.8 % IBW. Weight Source: Bed Scale  Current BMI (kg/m2): 24.4  Usual Body Weight: 135 lb 12.8 oz (61.6 kg) (5/26/21)  % Weight Change (Calculated): -8.1  BMI Categories: Overweight (BMI 25.0-29. 9)    Estimated Daily Nutrient Needs:  Energy Requirements Based On: Kcal/kg  Weight Used for Energy Requirements: Current  Energy (kcal/day): 5265-3902 (25-30 kcals/kg)  Weight Used for Protein Requirements: Ideal  Protein (g/day): 45-55 (1-1.2 g/kg)  Method Used for Fluid Requirements: 1 ml/kcal  Fluid (ml/day): 1500    Nutrition Diagnosis:   No nutrition diagnosis at this time     Nutrition Interventions:   Food and/or Nutrient Delivery: Continue Current Diet  Nutrition Education/Counseling: No recommendation at this time  Coordination of Nutrition Care: Continue to monitor while inpatient     Goals:     Goals: Meet at least 75% of estimated needs,by next RD assessment       Nutrition Monitoring and Evaluation:   Behavioral-Environmental Outcomes: None Identified  Food/Nutrient Intake Outcomes: None Identified  Physical Signs/Symptoms Outcomes: Biochemical Data,Meal Time Behavior,Weight    Discharge Planning:     Too soon to determine     Neftali Jaimes RD, LD  Contact: [phone]

## 2022-05-25 NOTE — CONSULTS
Infectious Disease Consult Note  2022   Patient Name: Barb Staples : 1927   Impression  Polymicrobial Bacteremia Secondary to Unclear Source, Possible Intra-Abdominal and/or UTI:    Rhabdomyolysis:    Left Heel Pressure Sore: Reports new from fall    Chest Rash:    T-max 99.6  Leukocytosis initially 11.8, now normalized  -UA , RBC 42, bladder scan reveals urinary retention  -Rapid COVID-19 Negative  -BC  Klebsiella pneumoniae, Escherichia coli, Staphylococcus Spp  -CXR: no acute traumatic findings. 2.2 cm RML nodular focus with attention on CT chest rec for eval to exclude underlying malignancy    Acute Encephalopathy:  Dr. 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 Kate Escalera onboard    Dementia    Multi-morbidity: per PMHx: HTN, AAA, dementia, colonoscopy , HLD, lung mass, DVTs on [facility]    Plan:  Continue IV cefepime 2 gm q12h  Continue IV vancomycin per pharmacy dosing  Trend CRP and Pct, ordered  Ordered Repeat BC , MRSA screen,   Await urine culture  Obtain CT WO Contrast of the chest to evaluate lung mass/nodule      Thank you for allowing me to consult in the care of this patient.  ------------------------  REASON FOR CONSULT: Infective syndrome,  Bacteremia     Requested by: Dr. Lia Roman is a 80 y.o.  female who was admitted 2022 for further evaluation and management of falls from [facility]. The patient is a poor historian, the HPI is also obtained from the EMR. The patient resides in an independent living area, at baseline requires very minimal assistance. She was found down in the bathroom per staff, with her last known well about 2 days prior to admission. She was reportedly confused and found in urine.   She presented to the ED with a low grade temp, CT of the head, cervical, thoracic and lumbar spines were non-acute. She states she \"totally felt healthy\" prior to the fall event. Denies ever having pain, dysuria, or any fever/chills prior to the fall. Infectious diseases service was consulted to evaluate the pt, and recommend further investigative and therapeutic measures. ROS: Other systems reviewed Including eyes, ENT, respiratory, cardiovascular, GI, , dermatologic, neurologic, psych, hem/lymphatic, musculoskeletal and endocrine were negative other than what is mentioned above. Patient Active Problem List    Diagnosis Date Noted    Fall     Toxic metabolic encephalopathy     Seizure-like activity ([facility] [address]     Fall at home, initial encounter 05/23/2022    Rectal bleeding 01/07/2021     Past Medical History:   Diagnosis Date    Hypertension       Past Surgical History:   Procedure Laterality Date    COLONOSCOPY N/A 1/9/2021    COLONOSCOPY DIAGNOSTIC performed by Brice Gregory MD at [facility] ENDOSCOPY      No family history on file. Infectious disease related family history - not contibutory. SOCIAL HISTORY  Social History     Tobacco Use    Smoking status: Current Every Day Smoker     Packs/day: 0.25    Smokeless tobacco: Never Used   Substance Use Topics    Alcohol use: Not on file      Born: [place], [place]  Lives: [place], [place] alone in independent living at [facility]  Occupation: Retired   No recent travel of significance. No recent unusual exposures. NO pets     ALLERGIES  No Known Allergies   MEDICATIONS  Reviewed and are per the chart/EMR.       Antibiotics:   Present:  Vancomycin 5/24-  Cefepime 5/24-    Past:     -------------------------------------------------------------------------------------------------------------------    Vital Signs:  Vitals:    05/25/22 0800   BP: 117/67   Pulse:    Resp:    Temp:    SpO2:          Exam:    VS: noted; wt 124 lb (56.6 kg) Height 5'  Gen: alert, appears oriented, no distress, up in the chair. Skin: no stigmata of endocarditis  Wounds: C/D/I left heel pressure sore, and erythema, no drainage, on mid chest to under bilateral breasts. HEMT: AT/NC Oropharynx pink, moist, and without lesions or exudates; dentition in good state of repair  Eyes: PERRLA, EOMI, conjunctiva pink, sclera anicteric. Neck: Supple. Trachea midline. No LAD. Chest: no distress and CTA. Good air movement. Room air. Heart: NSR and no MRG. Abd: soft, non-distended, no tenderness, no hepatomegaly. Normoactive bowel sounds. Ext: no clubbing, cyanosis, or edema  Neuro: Mental status intact. CN 2-12 intact and no focal sensory or motor deficits     Diagnostic Studies: reviewed  5/23/22 CT Head WO Contrast:  Impression   No acute intracranial abnormality. 5/23/22 CT Cervical Spine WO Contrast:  Impression   No acute abnormality of the cervical spine. 5/23/22 CT Thoracic Spine WO Contrast:  Impression   No acute thoracic spine trauma.       8 mm average diameter right upper lobe subpleural pulmonary nodule. Follow-up as clinically warranted according to the Fleischner criteria below.       RECOMMENDATIONS:   8 mm right solid pulmonary nodule within the upper lobe. Recommend a   non-contrast Chest CT at 6-12 months. If patient is high risk for malignancy,   recommend an additional non-contrast Chest CT at 18-24 months; if patient is   low risk for malignancy a non-contrast Chest CT at 18-24 months is optional.   These guidelines do not apply to immunocompromised patients and patients with   cancer. Follow up in patients with significant comorbidities as clinically   warranted. For lung cancer screening, adhere to Lung-RADS guidelines. Reference: Radiology. 2017; 284(1):228-43.     5/23/22 CT Lumbar Spine WO Contrast:  Impression   No acute fracture.  Severe degenerative changes.  MRI suggested.      5/23/22 XR Chest Portable:  Impression   No acute traumatic findings of the chest     2.2 cm right mid lung rounded nodular focus with attention on CT chest   recommended for further evaluation to exclude underlying malignancy     5/23/22 XR Pelvis:  Impression   No acute traumatic findings of the pelvis identified     5/23/22 VL Dup Carotid Bilateral:  Impression   The right internal carotid artery demonstrates 0-50% stenosis.       The left internal carotid artery demonstrates 0-50% stenosis.       Bilateral vertebral arteries are patent with flow in the normal direction. 5/23/22 MRI Brain WO Contrast:  Impression   No acute intracranial abnormality.       Minor sinus mucosal disease and minor left mastoid mucoperiosteal thickening   of doubtful clinical significance. I have examined this patient and available medical records on this date and have made the above observations, conclusions and recommendations. Electronically signed by: Electronically signed by Duane Mota.  KIKI Jarquin CNP on 5/25/2022 at 8:35 AM

## 2022-05-25 NOTE — PROGRESS NOTES
Neurology Service Progress Note  [facility]  Patient Name: Sugar Ang  : 1927        Subjective:   Reason for consult: LOC  Patient seen and examined. Chart reviewed in detail. Patient sitting up in bedside recliner at this time. Patient is alert to self, and location however continues to tell me that it is 1922. No reports or documented seizure-like activity. Awaiting for EEG completion. Past Medical History:   Diagnosis Date    Hypertension     :   Past Surgical History:   Procedure Laterality Date    COLONOSCOPY N/A 2021    COLONOSCOPY DIAGNOSTIC performed by Trudy Kelley MD at [address]. \Hospital Has a New Name and Outlook.\"" ENDOSCOPY     Medications:  Scheduled Meds:   clotrimazole-betamethasone   Topical BID    cefepime  2,000 mg IntraVENous Q12H    vancomycin  750 mg IntraVENous Q24H    miconazole   Topical BID    sodium chloride flush  5-40 mL IntraVENous 2 times per day    donepezil  5 mg Oral QPM    lisinopril  40 mg Oral Daily    pantoprazole  40 mg Oral QAM AC    rivaroxaban  20 mg Oral Daily     Continuous Infusions:   sodium chloride 25 mL/hr (22 0309)     PRN Meds:.sodium chloride flush, sodium chloride, ondansetron **OR** ondansetron, polyethylene glycol, acetaminophen **OR** acetaminophen    No Known Allergies  Social History     Socioeconomic History    Marital status:       Spouse name: Not on file    Number of children: Not on file    Years of education: Not on file    Highest education level: Not on file   Occupational History    Not on file   Tobacco Use    Smoking status: Current Every Day Smoker     Packs/day: 0.25    Smokeless tobacco: Never Used   Substance and Sexual Activity    Alcohol use: Not on file    Drug use: Not on file    Sexual activity: Not on file   Other Topics Concern    Not on file   Social History Narrative    Not on file     Social Determinants of Health     Financial Resource Strain:     Difficulty of Paying Living Expenses: Not on file   Food Insecurity:     Worried About Running Out of Food in the Last Year: Not on file    Mukul of Food in the Last Year: Not on file   Transportation Needs:     Lack of Transportation (Medical): Not on file    Lack of Transportation (Non-Medical): Not on file   Physical Activity:     Days of Exercise per Week: Not on file    Minutes of Exercise per Session: Not on file   Stress:     Feeling of Stress : Not on file   Social Connections:     Frequency of Communication with Friends and Family: Not on file    Frequency of Social Gatherings with Friends and Family: Not on file    Attends Orthodoxy Services: Not on file    Active Member of [address] [place] [address] [employer] or Organizations: Not on file    Attends Club or Organization Meetings: Not on file    Marital Status: Not on file   Intimate Partner Violence:     Fear of Current or Ex-Partner: Not on file    Emotionally Abused: Not on file    Physically Abused: Not on file    Sexually Abused: Not on file   Housing Stability:     Unable to Pay for Housing in the Last Year: Not on file    Number of [place] in the Last Year: Not on file    Unstable Housing in the Last Year: Not on file      No family history on file.       Physical Exam:       Vitals:    05/25/22 0800 05/25/22 0834 05/25/22 1023 05/25/22 1523   BP: 117/67 125/69  116/64   Pulse:  95  83   Resp:  16  24   Temp:  98.1 °F (36.7 °C)  97.7 °F (36.5 °C)   TempSrc:  Oral  Oral   SpO2:  94%  94%   Weight:       Height:   5' (1.524 m)        Wt Readings from Last 3 Encounters:   05/25/22 124 lb 11.2 oz (56.6 kg)   01/10/21 138 lb 14.4 oz (63 kg)     Temp Readings from Last 3 Encounters:   05/25/22 97.7 °F (36.5 °C) (Oral)   01/12/21 98.1 °F (36.7 °C) (Oral)     BP Readings from Last 3 Encounters:   05/25/22 116/64   01/12/21 (!) 149/80   01/09/21 (!) 135/103     Pulse Readings from Last 3 Encounters:   05/25/22 83   01/12/21 82        Gen: A&O x 2, NAD, cooperative  HEENT: NC/AT, EOMI, PERRL, mmm, neck supple, no meningeal signs; Heart: SR  Lungs: Respirations Unlabored  Ext: no edema, no calf tenderness b/l  Psych: normal mood and affect  Skin: no rashes or lesions    NEUROLOGIC EXAM:    Mental Status: A&O to self, and location NAD, speech clear, language fluent, repetition and naming intact, follows commands appropriately    Cranial Nerve Exam:   CN II-XII: PERRL, VFF, no nystagmus, no gaze paresis, sensation V1-V3 intact b/l, muscles of facial expression symmetric; hearing intact to conversational tone, palate elevates symmetrically, shoulder elevation symmetric and tongue protrudes midline with movement side to side.     Motor Exam:       Strength 5/5 UE's/LE's b/l  Tone and bulk normal   No pronator drift    Deep Tendon Reflexes: 2/4 biceps, triceps, brachioradialis, patellar, and achilles b/l; flexor plantar responses b/l    Sensation: Intact light touch/vibration UE's/LE's b/l    Coordination/Cerebellum:       Tremors--none      Rapidly alternating movements: no dysdiadochokinesia b/l                Heel-to-Shin: no dysmetria b/l      Finger-to-Nose: no dysmetria b/l    Gait and stance:      Gait: deferred      LABS:        CBC:   Recent Labs     05/24/22  0249   WBC 9.5   RBC 4.68   HGB 13.2   HCT 42.5      MCV 90.8     BMP:    Recent Labs     05/25/22  0943      K 3.5      CO2 22   BUN 18   CREATININE 1.0   GLUCOSE 110*   CALCIUM 8.5       IMAGING:    CTH     Impression   No acute intracranial abnormality.           Carotid doppler      Impression   The right internal carotid artery demonstrates 0-50% stenosis.       The left internal carotid artery demonstrates 0-50% stenosis.       Bilateral vertebral arteries are patent with flow in the normal direction.           MRI brain     Impression   No acute intracranial abnormality.       Minor sinus mucosal disease and minor left mastoid mucoperiosteal thickening   of doubtful clinical significance.             Above imaging personally reviewed in detail. ASSESSMENT/PLAN:     80 y.o. -female with PMH presenting to [facility] status post fall at nursing facility. Neurology consulted to evaluate for possible seizure and altered mental status. 1. Toxic metabolic encephalopathy  -Secondary to urosepsis as UA with WBC of >200, BCx Positive x4  --Further management of infection per IM  -Anticipate that patient's mentation will improve with treatment of infection  -Low suspicion for seizure as etiology for fall  --Recommend PT/OT/ST for the recommendations    Neurodiagnostics  --CT as above  --Carotid doppler as above  --MRI brain nonacute  --EEG ordered, once completed and interpreted if unremarkable we will sign off at this time. Patient discussed with attending physician Dr. Dilia Ruiz    Thank you for allowing us to participate in the care of your patient. If there are any questions regarding evaluation please feel free to contact us.      (Please note that portions of this note were completed with a voice recognition program.  Efforts were made to edit the dictations but occasionally words are mistranscribed.)      KIKI Spence - CNP, 5/25/2022    --------

## 2022-05-25 NOTE — PROGRESS NOTES
Occupational Therapy    Occupational Therapy Treatment Note    Name: Rolly Preston MRN: [id number] :   1927   Date:  2022   Admission Date: 2022 Room:  [address] [place] [address]-A     Primary Problem: fall, leukocytosis and altered mental status    Restrictions/Precautions: General Precautions, Fall Risk, Telemetry, Bed/Chair Alarm    Communication with other providers:  Cotx with PT Michael Dominguez to NELSON    Subjective:  Patient states: \"I can try getting up and moving today. I do not know how much I'll be able to do. \"  Pain: Pt denied pain this date. Objective:    Observation: Seated up in chair upon OT arrival, pleasantly confused and agreeable to OT treatment  Objective Measures: Vital signs stable during session, alert and oriented to self-only    Treatment, including education:  Therapeutic Activity Training:   Cues were given for safety, sequence, UE/LE placement, awareness, and balance. Self Care Training:   Cues were given for safety, sequence, UE/LE placement, visual cues, and balance. Pt was received seated up in chair upon OT arrival. Pt re-introduced to role of OT and importance of OOB/EOB activity. Pt transferred [address] sit to stand from chair with cueing provided to push off from arm rests and safe hand/foot placement. Pt ambulated ~35ft CGA with RW from room to out in hallway with cueing provided for RW mgmt. Pt transferred [address] stand to sit in chair with cueing provided to reach back for arm rests and controlled descent to chair. Pt then transferred [address] sit to stand from chair and ambulated an additional ~35ft CGA with RW from hallway to back in room. Pt then ambulated ~15ft CGA with RW to the bathroom with cues provided for RW mgmt. Pt transferred [address] stand to sit on toilet with cueing provided for reaching back for grab bar and controlled descent to toilet. Pt performed toileting task while seated CGA with steadying provided during clothing mgmt both directions.  Pt completed brittaney-care while seated on the toilet. Pt then performed hand hygiene and hair grooming CGA in standing at the sink. Pt then ambulated ~10ft to the chair CGA with RW. Pt transferred [address] stand to sit in chair with cueing provided to reach back for arm rests and controlled descent to chair. Pt left in chair with all lines intact, call light and phone within reach and positioned for comfort. Assessment / Impression:    Patient's tolerance of treatment: Well  Adverse Reaction: None  Significant change in status and impact:  Improved functional mobility this date  Barriers to improvement: Cognitive impairment, decreased activity tolerance      Plan for Next Session:    Continue per OT POC    Time in: 1011  Time out: 1043  Timed treatment minutes:  32  Total treatment time: 32      Electronically signed by:    Valeri Vann,S/OT  5/25/2022, 11:57 AM    Irena Gross OTR/L, MOT, [id number]     \"I, the qualified professional, was present and in the room for the entire session. The student participates in the delivery of services when the qualified practitioner is directing the service, making the skilled judgment, and is responsible for the assessment and treatment. \"

## 2022-05-25 NOTE — CARE COORDINATION
CECELIA received a call from [facility] and she looked over pt PT/OT notes and feels pt need to come back skilled. [facility] will start precert process. LESLIEW spoke with pt and informed her what [facility] had stated. Pt is agreeable to go to [place] skilled.

## 2022-05-26 LAB
ANION GAP SERPL CALCULATED.3IONS-SCNC: 8 MMOL/L (ref 4–16)
BUN BLDV-MCNC: 14 MG/DL (ref 6–23)
CALCIUM SERPL-MCNC: 8.2 MG/DL (ref 8.3–10.6)
CHLORIDE BLD-SCNC: 109 MMOL/L (ref 99–110)
CO2: 25 MMOL/L (ref 21–32)
CREAT SERPL-MCNC: 0.8 MG/DL (ref 0.6–1.1)
CULTURE: ABNORMAL
DOSE AMOUNT: NORMAL
DOSE TIME: NORMAL
GFR AFRICAN AMERICAN: >60 ML/MIN/1.73M2
GFR NON-AFRICAN AMERICAN: >60 ML/MIN/1.73M2
GLUCOSE BLD-MCNC: 105 MG/DL (ref 70–99)
HIGH SENSITIVE C-REACTIVE PROTEIN: 7.9 MG/L
Lab: ABNORMAL
POTASSIUM SERPL-SCNC: 3.9 MMOL/L (ref 3.5–5.1)
SODIUM BLD-SCNC: 142 MMOL/L (ref 135–145)
SPECIMEN: ABNORMAL
VANCOMYCIN RANDOM: 16.3 UG/ML

## 2022-05-26 PROCEDURE — 2580000003 HC RX 258: Performed by: INTERNAL MEDICINE

## 2022-05-26 PROCEDURE — 6360000002 HC RX W HCPCS: Performed by: INTERNAL MEDICINE

## 2022-05-26 PROCEDURE — 80048 BASIC METABOLIC PNL TOTAL CA: CPT

## 2022-05-26 PROCEDURE — 6370000000 HC RX 637 (ALT 250 FOR IP): Performed by: NURSE PRACTITIONER

## 2022-05-26 PROCEDURE — 99232 SBSQ HOSP IP/OBS MODERATE 35: CPT | Performed by: NURSE PRACTITIONER

## 2022-05-26 PROCEDURE — 2500000003 HC RX 250 WO HCPCS: Performed by: INTERNAL MEDICINE

## 2022-05-26 PROCEDURE — 1200000000 HC SEMI PRIVATE

## 2022-05-26 PROCEDURE — 6370000000 HC RX 637 (ALT 250 FOR IP): Performed by: INTERNAL MEDICINE

## 2022-05-26 PROCEDURE — 2500000003 HC RX 250 WO HCPCS: Performed by: EMERGENCY MEDICINE

## 2022-05-26 PROCEDURE — 80202 ASSAY OF VANCOMYCIN: CPT

## 2022-05-26 PROCEDURE — 36415 COLL VENOUS BLD VENIPUNCTURE: CPT

## 2022-05-26 PROCEDURE — 2580000003 HC RX 258: Performed by: NURSE PRACTITIONER

## 2022-05-26 PROCEDURE — 86141 C-REACTIVE PROTEIN HS: CPT

## 2022-05-26 PROCEDURE — 94761 N-INVAS EAR/PLS OXIMETRY MLT: CPT

## 2022-05-26 RX ADMIN — SODIUM CHLORIDE, PRESERVATIVE FREE 10 ML: 5 INJECTION INTRAVENOUS at 21:14

## 2022-05-26 RX ADMIN — PANTOPRAZOLE SODIUM 40 MG: 40 TABLET, DELAYED RELEASE ORAL at 06:54

## 2022-05-26 RX ADMIN — SODIUM CHLORIDE: 9 INJECTION, SOLUTION INTRAVENOUS at 21:11

## 2022-05-26 RX ADMIN — MICONAZOLE NITRATE: 2 POWDER TOPICAL at 21:14

## 2022-05-26 RX ADMIN — DEXTROSE MONOHYDRATE 750 MG: 50 INJECTION, SOLUTION INTRAVENOUS at 21:12

## 2022-05-26 RX ADMIN — CLOTRIMAZOLE AND BETAMETHASONE DIPROPIONATE: 10; .5 CREAM TOPICAL at 21:14

## 2022-05-26 RX ADMIN — LISINOPRIL 40 MG: 20 TABLET ORAL at 09:09

## 2022-05-26 RX ADMIN — MICONAZOLE NITRATE: 2 POWDER TOPICAL at 09:10

## 2022-05-26 RX ADMIN — CEFEPIME HYDROCHLORIDE 2000 MG: 2 INJECTION, POWDER, FOR SOLUTION INTRAVENOUS at 01:58

## 2022-05-26 RX ADMIN — RIVAROXABAN 20 MG: 20 TABLET, FILM COATED ORAL at 17:59

## 2022-05-26 RX ADMIN — SODIUM CHLORIDE, PRESERVATIVE FREE 10 ML: 5 INJECTION INTRAVENOUS at 09:10

## 2022-05-26 RX ADMIN — CEFEPIME HYDROCHLORIDE 2000 MG: 2 INJECTION, POWDER, FOR SOLUTION INTRAVENOUS at 15:19

## 2022-05-26 RX ADMIN — DONEPEZIL HYDROCHLORIDE 5 MG: 5 TABLET, FILM COATED ORAL at 21:13

## 2022-05-26 RX ADMIN — CLOTRIMAZOLE AND BETAMETHASONE DIPROPIONATE: 10; .5 CREAM TOPICAL at 09:09

## 2022-05-26 NOTE — PROGRESS NOTES
Physician Progress Note      Brigitte Mancera  ANAT #:                  [id number]  :                       1927  ADMIT DATE:       2022 10:59 AM  DISCH DATE:  RESPONDING  PROVIDER #:        Sonia Keller        QUERY TEXT:    Urosepsis - UTI vs Sepsis: Please provide further specificity, if known. Clinical indicators include: urosepsis  Options provided:  -- Urinary tract infection with sepsis  -- Urinary tract infection without sepsis  -- Other - I will add my own diagnosis  -- Disagree - Not applicable / Not valid  -- Disagree - Clinically Unable to determine / Unknown        PROVIDER RESPONSE TEXT:    Provider was unable to determine a response for this query.       Electronically signed by:  Sonia Keller 2022 10:16 AM

## 2022-05-26 NOTE — PROGRESS NOTES
Brief Neurology Note. Chart reviewed in detail. EEG as indicated below       EEG Interpretation:   The EEG was abnormal due to the presence of:   · Mild to moderate generalized slowing. This is a non-specific finding but is consistent with a generalized disturbance of cerebral function. It may be seen in a variety of conditions, such as toxic, metabolic, post-anoxic, multi-focal or diffuse structural abnormalities. · No electrographic seizures or non-convulsive status epilepticus was seen over the entire monitoring period. Please continue to follow previous plan of care as recommended. Upon discharge planning, please follow-up with neurology as outpatient. Nothing further from neurology standpoint, we will sign off. If you have any further questions please do not hesitate to contact us.   Thank you for your consultation    Monet Rizvi, [facility]  Neurology Services

## 2022-05-26 NOTE — PROGRESS NOTES
Physical Therapy  Attempted to see pt this PM but pt soundly sleeping. Will continue to attempt as schedule allows.

## 2022-05-26 NOTE — CONSULTS
[address] [facility]  consulted by Dr. Chalo Thomas for monitoring and adjustment. Indication for treatment: Bloodstream infection  Goal trough: [] 10-15 mcg/mL or [x] 15-20 mcg/ml  AUC/CEDRIC: [] <500 or [x] 400-600    Pertinent Laboratory Values:   Temp Readings from Last 3 Encounters:   05/26/22 98.1 °F (36.7 °C) (Oral)   01/12/21 98.1 °F (36.7 °C) (Oral)     Recent Labs     05/23/22  1302 05/23/22  1302 05/24/22  0249 05/24/22  0249 05/24/22 2107 05/25/22  0943 05/25/22  1237   WBC 11.8*  --  9.5  --   --   --   --    LACTATE 2.6*   < > 1.3   < > 1.8 2.1* 1.8    < > = values in this interval not displayed. Recent Labs     05/24/22 2107 05/25/22  0943 05/26/22  0504   BUN 21 18 14   CREATININE 1.1 1.0 0.8     Estimated Creatinine Clearance: 35 mL/min (based on SCr of 0.8 mg/dL). Intake/Output Summary (Last 24 hours) at 5/26/2022 1248  Last data filed at 5/26/2022 0908  Gross per 24 hour   Intake 160 ml   Output 1 ml   Net 159 ml       Pertinent Cultures:  Date    Source    Results  5/23   Blood (4 of 4) - Klebsiella, E.coli, Staph species             Vancomycin level:   TROUGH:  No results for input(s): VANCOTROUGH in the last 72 hours. RANDOM:    Recent Labs     05/26/22  0504   VANCORANDOM 16.3       Assessment:  · SCr, BUN, and urine output: SCR appears close to baseline, no UOP data  · Day(s) of therapy: 3  · Vancomycin concentration:   · 5/26 - 16.3, 7 hour level on 750mg q24h,     Plan:  · Renal trends improved down close to baseline. · Continue on Vancomycin 750mg IV every 24 hours with a predicted therapeutic AUC at steady state  · Recheck the vanco level in 2 days to monitor for accumulation 2/2 advanced age. · Pharmacy will continue to monitor patient and adjust therapy as indicated    [address] 5/28 @06:00    Thank you for the consult. Scarlett Lobato, [facility]  5/26/2022 12:48 PM

## 2022-05-26 NOTE — PROGRESS NOTES
Hospital Medicine Progress Note     Date:  5/26/2022    PCP: Evangelist Porter MD (Tel: [phone])    Date of Admission: 5/23/2022    Chief complaint:   Chief Complaint   Patient presents with    Fall       Brief admission history: Pleasant 79-year-old female with history of essential hypertension GERD, dementia (on Aricept), history of left lower extremity DVT (on Xarelto), AAA, lung mass, history of recurrent falls, who presents to the emergency room following fall at [facility] facility. On presentation to the emergency room, she has slightly elevated creatinine kinase (of 499, not otherwise qualified as rhabdomyolysis), mild leukocytosis, elevated lactic acid level. EKG was concerning for atrial fibrillation. During hospital stay, neurology was consulted for evaluation of possible toxic-metabolic encephalopathy; however, following unremarkable MRI-brain, it was later determined confusion could be secondary to urinary tract infection. Abnormal EKG was also evaluated by cardiology, determined to be sinus rhythm with bifascicular block and PACs. She has nonzero troponin, which was determined to be secondary to elevated creatinine kinase, possible demand ischemia (type II NSTEMI); and no further work-up per cardiology. Assessment/plan:  1. Fall at assisted living facility. Suspect possible mechanical.  Maintain on fall precautions. Will need ECF at the time of discharge. 2. Polymicrobial bacteremia. Blood cultures with coagulase-negative Staph, E. coli, Klebsiella pneumonia. Suspected to be GI versus  translocation. Patient on vancomycin and cefepime. ID on board and will be guiding regarding antibiotic needs for disposition. 3. Sepsis secondary to urinary tract infection, E. coli UTI. Met sepsis criteria on admission based on tachycardia, tachypnea, lactic acidosis. Lactic acidosis has resolved following hydration. Continue antibiotics.   4. Patient has nonspecific creatinine kinase, does not otherwise qualify as rhabdomyolysis. Initial creatinine kinase was 499, repeat was 151.  5. Other comorbidities: history of essential hypertension GERD, dementia (on Aricept), history of left lower extremity DVT (on Xarelto), AAA, lung mass, history of recurrent falls. 6. Disposition. Possible discharge in 1 to 2 days. Diet: ADULT DIET; Regular; Low Fat/Low Chol/High Fiber/2 gm Na    Code status: Full Code   ----------  Subjective  Patient is confused (underlying dementia), has no complaints. Objective  Physical exam:  Vitals: BP (!) 139/98   Pulse 80   Temp 98.1 °F (36.7 °C) (Oral)   Resp 19   Ht 5' (1.524 m)   Wt 129 lb 14.4 oz (58.9 kg)   SpO2 93%   BMI 25.37 kg/m²   Gen/overall appearance: Not in acute distress. Alert. Confused. Head: Normocephalic, atraumatic  Eyes: EOMI, good acuity  ENT: Oral mucosa moist  Neck: No JVD, thyromegaly  CVS: Nml S1S2, no MRG, RRR  Pulm: Clear bilaterally. No crackles/wheezes  Gastrointestinal: Soft, NT/ND, +BS  Musculoskeletal: No edema. Warm  Neuro: No focal deficit. Moves extremity spontaneously. Psychiatry: Appropriate affect. Not agitated. Skin: Warm, dry with normal turgor. No rash  Capillary refill: Brisk,< 3 seconds   Peripheral Pulses: +2 palpable, equal bilaterally      24HR INTAKE/OUTPUT:      Intake/Output Summary (Last 24 hours) at 5/26/2022 1120  Last data filed at 5/26/2022 0908  Gross per 24 hour   Intake 160 ml   Output 1 ml   Net 159 ml     No intake/output data recorded.   I/O this shift:  In: 160 [P.O.:160]  Out: 1 [Urine:1]  Meds:    rivaroxaban  20 mg Oral Daily    vancomycin  750 mg IntraVENous Q24H    clotrimazole-betamethasone   Topical BID    cefepime  2,000 mg IntraVENous Q12H    miconazole   Topical BID    sodium chloride flush  5-40 mL IntraVENous 2 times per day    donepezil  5 mg Oral QPM    lisinopril  40 mg Oral Daily    pantoprazole  40 mg Oral QAM AC     Infusions:    sodium chloride 25 mL/hr (05/25/22 0698) PRN Meds: sodium chloride flush, sodium chloride, ondansetron **OR** ondansetron, polyethylene glycol, acetaminophen **OR** acetaminophen    Labs/imaging:  CBC:   Recent Labs     05/23/22  1302 05/24/22  0249   WBC 11.8* 9.5   HGB 17.1* 13.2    171     BMP:    Recent Labs     05/24/22  2107 05/25/22  0943 05/26/22  0504    143 142   K 3.5 3.5 3.9    109 109   CO2 24 22 25   BUN 21 18 14   CREATININE 1.1 1.0 0.8   GLUCOSE 150* 110* 105*     Hepatic:   Recent Labs     05/23/22  1302 05/24/22  2107   AST 25 19   ALT 12 11   BILITOT 1.0 0.4   ALKPHOS 99 73       Deanna Escalante MD  -------------------------------  Rounding hospitalist

## 2022-05-26 NOTE — CARE COORDINATION
BERTHA placed call to Vlad Baer at [facility] to check on pre-cert status. LM asking for a return call to this . Received call back from Vlad Baer at [facility] stating that the pre-cert is still pending. She stated that the insurance company may ask for a peer to peer review.

## 2022-05-26 NOTE — PROGRESS NOTES
Infectious Disease Progress Note  2022   Patient Name: Devin Piña : 1927   Impression  · Polymicrobial Bacteremia Secondary to Unclear Source, Possible Intra-Abdominal and/or UTI:     ? Rhabdomyolysis:     ? Left Heel Pressure Sore: Reports new from fall     ? Chest Rash:     § Afebrile, no leukocytosis,   § -UA , RBC 42, bladder scan reveals urinary retention  § -Urine culture: E.coli >100,000  § -Rapid COVID-19 Negative  § -BC  Klebsiella pneumoniae, Escherichia coli, Staphylococcus Spp  § -CXR: no acute traumatic findings. 2.2 cm RML nodular focus with attention on CT chest rec for eval to exclude underlying malignancy     · Acute Encephalopathy:  § Dr. Yarely Alves, neurology, onboard  § rec EEG, imp of toxic encephalopathy  to sepsis (possible UTI), imp of probable improvement with resolution of sepsis     ? 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 AAA:  ? HTN:  ? 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 Suraj Harper onboard     ? Dementia     · Multi-morbidity: per PMHx: HTN, AAA, dementia, colonoscopy , HLD, lung mass, DVTs on [facility]     Plan:  · Continue IV cefepime 2 gm q12h  ? Continue IV vancomycin per pharmacy dosing  ? Trend CRP and Pct, ordered  ? Ordered Repeat BC , MRSA screen,   ? Await urine culture  ? Obtain CT WO Contrast of the chest to evaluate lung mass/nodule      Ongoing Antimicrobial Therapy  Vancomycin -  Cefepime -? Completed Antimicrobial Therapy  ? History:? Interval history noted. Chief complaint: Polymicrobial bacteremia: CoNS, E coli, and K penumoniae. GI vs  source. Denies n/v/d/f or untoward effects of antibiotics.     Physical Exam:  Vital Signs: /69   Pulse 72   Temp 98.1 °F (36.7 °C) (Oral)   Resp 24   Ht 5' (1.524 m)   Wt 129 lb 14.4 oz (58.9 kg)   SpO2 92%   BMI 25.37 kg/m²     Gen: somnolent in bed, no distress  Wounds: C/D/I left heel pressure sore, and erythema, no drainage, on mid chest to under bilateral internal carotid artery demonstrates 0-50% stenosis.       Bilateral vertebral arteries are patent with flow in the normal direction.      5/23/22 MRI Brain WO Contrast:  Impression   No acute intracranial abnormality.       Minor sinus mucosal disease and minor left mastoid mucoperiosteal thickening   of doubtful clinical significance. Labs:    Recent Results (from the past 24 hour(s))   Basic Metabolic Panel    Collection Time: 05/26/22  5:04 AM   Result Value Ref Range    Sodium 142 135 - 145 MMOL/L    Potassium 3.9 3.5 - 5.1 MMOL/L    Chloride 109 99 - 110 mMol/L    CO2 25 21 - 32 MMOL/L    Anion Gap 8 4 - 16    BUN 14 6 - 23 MG/DL    CREATININE 0.8 0.6 - 1.1 MG/DL    Glucose 105 (H) 70 - 99 MG/DL    Calcium 8.2 (L) 8.3 - 10.6 MG/DL    GFR Non-African American >60 >60 mL/min/1.73m2    GFR African American >60 >60 mL/min/1.73m2   Vancomycin Level, Random    Collection Time: 05/26/22  5:04 AM   Result Value Ref Range    Vancomycin Rm 16.3 UG/ML    DOSE AMOUNT DOSE AMT. GIVEN - 750mg     DOSE TIME DOSE TIME GIVEN - 1600    C-Reactive Protein    Collection Time: 05/26/22  5:04 AM   Result Value Ref Range    CRP, High Sensitivity 7.9 mg/L     CULTURE results: Invalid input(s): BLOOD CULTURE,  URINE CULTURE, SURGICAL CULTURE    Diagnosis:  Patient Active Problem List   Diagnosis    Rectal bleeding    Fall at home, initial encounter    Fall    Toxic metabolic encephalopathy    Seizure-like activity ([facility] [address]    Other seizures ([address]    E coli bacteremia    Bacteremia due to Klebsiella pneumoniae       Active Problems  Principal Problem:    Fall at home, initial encounter  Active Problems:    Fall    Toxic metabolic encephalopathy    Seizure-like activity ([address]    Other seizures ([address]    E coli bacteremia    Bacteremia due to Klebsiella pneumoniae  Resolved Problems:    * No resolved hospital problems. *    Electronically signed by: Electronically signed by KIKI Carmona CNP on 5/26/2022 at 5:30 PM

## 2022-05-27 LAB
CULTURE: NORMAL
HIGH SENSITIVE C-REACTIVE PROTEIN: 4.7 MG/L
Lab: NORMAL
PROCALCITONIN: 0.05
SPECIMEN: NORMAL

## 2022-05-27 PROCEDURE — 6360000002 HC RX W HCPCS: Performed by: INTERNAL MEDICINE

## 2022-05-27 PROCEDURE — 99232 SBSQ HOSP IP/OBS MODERATE 35: CPT | Performed by: NURSE PRACTITIONER

## 2022-05-27 PROCEDURE — 6370000000 HC RX 637 (ALT 250 FOR IP): Performed by: INTERNAL MEDICINE

## 2022-05-27 PROCEDURE — 84145 PROCALCITONIN (PCT): CPT

## 2022-05-27 PROCEDURE — 2500000003 HC RX 250 WO HCPCS: Performed by: EMERGENCY MEDICINE

## 2022-05-27 PROCEDURE — 97530 THERAPEUTIC ACTIVITIES: CPT

## 2022-05-27 PROCEDURE — 1200000000 HC SEMI PRIVATE

## 2022-05-27 PROCEDURE — 6370000000 HC RX 637 (ALT 250 FOR IP): Performed by: NURSE PRACTITIONER

## 2022-05-27 PROCEDURE — 2580000003 HC RX 258: Performed by: NURSE PRACTITIONER

## 2022-05-27 PROCEDURE — 86141 C-REACTIVE PROTEIN HS: CPT

## 2022-05-27 PROCEDURE — 36415 COLL VENOUS BLD VENIPUNCTURE: CPT

## 2022-05-27 PROCEDURE — 2500000003 HC RX 250 WO HCPCS: Performed by: INTERNAL MEDICINE

## 2022-05-27 PROCEDURE — 94761 N-INVAS EAR/PLS OXIMETRY MLT: CPT

## 2022-05-27 PROCEDURE — 2580000003 HC RX 258: Performed by: INTERNAL MEDICINE

## 2022-05-27 PROCEDURE — 97116 GAIT TRAINING THERAPY: CPT

## 2022-05-27 PROCEDURE — 97535 SELF CARE MNGMENT TRAINING: CPT

## 2022-05-27 RX ORDER — HYDRALAZINE HYDROCHLORIDE 25 MG/1
25 TABLET, FILM COATED ORAL EVERY 12 HOURS SCHEDULED
Status: DISCONTINUED | OUTPATIENT
Start: 2022-05-27 | End: 2022-06-02

## 2022-05-27 RX ORDER — LABETALOL HYDROCHLORIDE 5 MG/ML
10 INJECTION, SOLUTION INTRAVENOUS EVERY 4 HOURS PRN
Status: DISCONTINUED | OUTPATIENT
Start: 2022-05-27 | End: 2022-06-03 | Stop reason: HOSPADM

## 2022-05-27 RX ORDER — SULFAMETHOXAZOLE AND TRIMETHOPRIM 800; 160 MG/1; MG/1
1 TABLET ORAL EVERY 12 HOURS SCHEDULED
Status: DISCONTINUED | OUTPATIENT
Start: 2022-05-27 | End: 2022-06-01

## 2022-05-27 RX ADMIN — SODIUM CHLORIDE, PRESERVATIVE FREE 5 ML: 5 INJECTION INTRAVENOUS at 09:22

## 2022-05-27 RX ADMIN — SULFAMETHOXAZOLE AND TRIMETHOPRIM 1 TABLET: 800; 160 TABLET ORAL at 11:54

## 2022-05-27 RX ADMIN — SODIUM CHLORIDE: 9 INJECTION, SOLUTION INTRAVENOUS at 02:31

## 2022-05-27 RX ADMIN — Medication: at 13:10

## 2022-05-27 RX ADMIN — SODIUM CHLORIDE, PRESERVATIVE FREE 10 ML: 5 INJECTION INTRAVENOUS at 20:29

## 2022-05-27 RX ADMIN — HYDRALAZINE HYDROCHLORIDE 25 MG: 25 TABLET, FILM COATED ORAL at 02:28

## 2022-05-27 RX ADMIN — HYDRALAZINE HYDROCHLORIDE 25 MG: 25 TABLET, FILM COATED ORAL at 20:29

## 2022-05-27 RX ADMIN — LABETALOL HYDROCHLORIDE 10 MG: 5 INJECTION, SOLUTION INTRAVENOUS at 21:29

## 2022-05-27 RX ADMIN — SULFAMETHOXAZOLE AND TRIMETHOPRIM 1 TABLET: 800; 160 TABLET ORAL at 21:22

## 2022-05-27 RX ADMIN — RIVAROXABAN 20 MG: 20 TABLET, FILM COATED ORAL at 18:18

## 2022-05-27 RX ADMIN — CLOTRIMAZOLE AND BETAMETHASONE DIPROPIONATE: 10; .5 CREAM TOPICAL at 09:22

## 2022-05-27 RX ADMIN — CEFEPIME HYDROCHLORIDE 2000 MG: 2 INJECTION, POWDER, FOR SOLUTION INTRAVENOUS at 02:34

## 2022-05-27 RX ADMIN — LISINOPRIL 40 MG: 20 TABLET ORAL at 09:21

## 2022-05-27 RX ADMIN — MICONAZOLE NITRATE: 2 POWDER TOPICAL at 20:30

## 2022-05-27 RX ADMIN — CHLORHEXIDINE GLUCONATE: 4 LIQUID TOPICAL at 13:09

## 2022-05-27 RX ADMIN — Medication: at 20:29

## 2022-05-27 RX ADMIN — MICONAZOLE NITRATE: 2 POWDER TOPICAL at 09:21

## 2022-05-27 RX ADMIN — DONEPEZIL HYDROCHLORIDE 5 MG: 5 TABLET, FILM COATED ORAL at 20:29

## 2022-05-27 NOTE — PROGRESS NOTES
Occupational Therapy    Occupational Therapy Treatment Note    Name: Sugar Ang MRN: [id number] :   1927   Date:  2022   Admission Date: 2022 Room:  [address] [place] [address]-A     Primary Problem:  The primary encounter diagnosis was Fall, initial encounter. Diagnoses of Altered mental status, unspecified altered mental status type and Pulmonary nodule were also pertinent to this visit. Restrictions/Precautions:          General precautions; Fall risk    Communication with other providers: .Per chart review, patient is appropriate for therapeutic intervention, has Hx of dementia. NELSON Harmon. Subjective:  Patient states:  Pt agreeable to OT Tx session. Pain:   Location, Type, Intensity (0/10 to 10/10): Unable to rate, exhibits no s/s of pain    Objective:    Observation: Pt received reclining in bedside chair, alert and actively participated c cues for initiation / re-direction / sequencing. Pt is not oriented to self at this time. When using patient's name later in the Tx session, pt smiles and says, \"yes\". Pt follows cues c increased time for response and both verbal / physical cues. At end of Tx session, pt verbalized self by name and c cues, identified current location as hospital.  Objective Measures:  Telemetry, HR 82 at rest, 108 c activity. Treatment, including education:  Therapeutic Activity Training:   Therapeutic activity training was instructed today. Cues were given for safety, sequence, UE/LE placement, awareness, and balance. Activities performed today included transfer training and functional mobility. Sit to stands: Varied between Mod A to Min A depending on surface height, required max cues as detailed above for initiation and sequencing.     Stand to sits: Min A c RW + max verbal / tactile cues for safe body positioning / hand placement    SPT: See Toilet Transfer    Standing balance / tolerance: Fair (-)  Varied widely between Mod A for initially achieving standing balance to CGA during dynamic reaching at sink, tolerated two stands ~3 and 4 minutes each. Self Care Training:   Cues were given for safety, sequence, UE/LE placement, visual cues, and balance. Activities performed today included LB dressing, toileting, hand hygiene, and grooming. LB Dressing: Dep to thread and hike pull up pants  Toilet Transfer: Mod A / Min A c RW + max verbal / physical cues for sequencing / safe body positioning / hand placement and re-direction to task. Toileting: Dep for clothing mgmt up / down and for hygiene. Pt encouraged to attempt clothing mgmt up and clutched walker handles, refusing, stating, \"I can't. \"  Grooming: Min A, progressing to CGA c RW in stance at sink + need for SBA to turn faucets on / off and retrieve paper towels while pt performed hand hygiene at sink. Pt required increase time to perform. Sup seated + cue for initiation of face washing. All therapeutic intervention performed c emphasis on L dressing, toileting, grooming tasksdynamic balance / standing tolerance to inc strength, endurance and act tolerance for inc Indep c ADL tasks, func transfers / mobility. Safety  Patient safely in bedside chair + alarm placed at end of session, with call light/phone in reach, and nursing aware. Gait belt was used for func transfers / mobility. Assessment / Impression:    Patient's tolerance of treatment: Well  Adverse Reaction: None  Significant change in status and impact:  Appears to have decreased strength, required increased assist for sit to stand transfers this date. Barriers to improvement: Decreased cognition / balance / strength / endurance      Plan for Next Session:    Continue per OT POC per patient's tolerance c emphasis on functional transfers and standing balance / tolerance during ADL tasks.     Time in:  1338  Time out:  1413  Timed treatment minutes:  35  Total treatment time:  35      Electronically signed by:    Matt Kumar BOWSER/L  5/27/2022, 1:31 PM    Goals:  1. Pt will complete all aspects of bed mobility for EOB/OOB ADLs SBA with good safety awareness. 2. Pt will complete UB/LB bathing CGA with use of adapted techniques PRN. 3. Pt will complete all aspects of LB dressing Min A with use of adapted techniques PRN. 4. Pt will complete all functional transfers to and from bed, chair, toilet, shower chair SBA with good safety awareness. 5. Pt will ambulate HH distance to bathroom for toileting CGA with RW with good safety awareness. 6. Pt will complete all aspects of toileting task Mod A with use of good safety awareness. 7. Pt will complete oral hygiene/grooming routine in standing at sink SBA.   8. Pt will complete ther ex/ther act with focus on functional sitting and standing to improve activity tolerance for self-care.

## 2022-05-27 NOTE — CARE COORDINATION
CECELIA received a call from Nelsy with [employer] who stated that Soumya Baron is requesting a peer to peer and will need to be completed by noon. Doctor will need to call [phone] option [phone]. Doctor will need the following. .. Name:  Rossi Rubio  :  1927  Member ID #:  [id number]  QDY#[id number]    CECELIA PS doctor and informed him of the above info.

## 2022-05-27 NOTE — PROGRESS NOTES
On call DR updated via perfect serve of patients continued elevated blood pressures. 177/89, 188/85 automatic and 172/82 manual. New orders received, see chart.

## 2022-05-27 NOTE — PLAN OF CARE
Problem: Discharge Planning  Goal: Discharge to home or other facility with appropriate resources  Outcome: Progressing     Problem: Safety - Adult  Goal: Free from fall injury  Outcome: Progressing     Problem: Skin/Tissue Integrity  Goal: Absence of new skin breakdown  Description: 1. Monitor for areas of redness and/or skin breakdown  2. Assess vascular access sites hourly  3. Every 4-6 hours minimum:  Change oxygen saturation probe site  4. Every 4-6 hours:  If on nasal continuous positive airway pressure, respiratory therapy assess nares and determine need for appliance change or resting period.   Outcome: Progressing     Problem: ABCDS Injury Assessment  Goal: Absence of physical injury  Outcome: Progressing

## 2022-05-27 NOTE — CARE COORDINATION
DR. May Dear completed the peer to peer. He stated that the doctor at the insurance wants more detail of what pt can and cannot do. She feels that they only suggest SNF due other age. LSW read theapry notes and they no indicate that pt needs help with her ADLs. Pt ambulated 79 ft GCA. Pt is living in the independent living and does not have 24 hr supervision. LSW placed a WB asking for updated notes with more detail on what pt can and cannot do.

## 2022-05-27 NOTE — PROGRESS NOTES
Infectious Disease Progress Note  2022   Patient Name: Jareth Rai : 1927   Impression  · Polymicrobial Bacteremia Secondary to Complicated UTI: K.pneumoniae may be from GI or :    · MssA Screen Positive:     ? Rhabdomyolysis:     ? Left Heel Pressure Sore: Reports new from fall     ? Chest Rash:     § Afebrile, no leukocytosis. Mssa screen positive. Patient has clinically improved, will transition to po Bactrim x 14 days past neg BC (will cover UTI and positive MssA screen)   § -UA , RBC 42, bladder scan reveals urinary retention  § -Urine culture: E.coli >100,000  § -Rapid COVID-19 Negative  § -BC  Klebsiella pneumoniae, Escherichia coli, Staphylococcus Spp  § -Repeat BC 0/2 NGTD  § -CXR: no acute traumatic findings. 2.2 cm RML nodular focus with attention on CT chest rec for eval to exclude underlying malignancy     · Acute Encephalopathy:  § Dr. Marybeth Smith, neurology, onboard  § rec EEG, imp of toxic encephalopathy  to sepsis (possible UTI), imp of probable improvement with resolution of sepsis     ? 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 AAA:  ? HTN:  ? 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 Moon Conley onboard     ? Dementia     · Multi-morbidity: per PMHx: HTN, AAA, dementia, colonoscopy , HLD, lung mass, DVTs on [facility]     Plan:  · DC IV cefepime and vancomycin  · Start po Bactrim 1 DS bid (CrCl is 35, no adjustment necessary) x 14 total days of duration of ABX (end date from negative BC is 22)  · Need to watch lytes closely, Bactrim may cause hyperkalemia. · De-colonize MssA, ordered Bactroban and Hibiclens. Weekly labs drawn on Monday during the course of treatment  CBC with differential, CMP, ESR, CRP  Fax results to Attn: [facility]  ? # [phone] OK from ID standpoint to DC when ready  ? Follow up with PCP  ? I will be off, will return 22, thankyou! Ongoing Antimicrobial Therapy  Bactrim -?   Completed Antimicrobial Therapy  Cefepime 5/24-27  Vancomycin 5/24-27  ? History:? Interval history noted. Chief complaint: Polymicrobial bacteremia: CoNS, E coli, and K penumoniae. GI vs  source. Denies n/v/d/f or untoward effects of antibiotics. In bed, states is feeling better, denies any dysuria or abdominal pain. Physical Exam:  Vital Signs: BP (!) 180/97   Pulse 80   Temp 97.6 °F (36.4 °C) (Oral)   Resp 17   Ht 5' (1.524 m)   Wt 129 lb 14.4 oz (58.9 kg)   SpO2 94%   BMI 25.37 kg/m²     Gen: Resting in bed, no distress, oriented x 4  Wounds: C/D/I left heel pressure sore, and erythema, no drainage, on mid chest to under bilateral breasts. .  Chest: no distress and CTA. Good air movement. Room air. Heart: NSR and no MRG. Abd: soft, non-distended, no tenderness, no hepatomegaly. Normoactive bowel sounds. Ext: no clubbing, cyanosis, or edema  Neuro: Mental status intact. CN 2-12 intact and no focal sensory or motor deficits     Radiologic / Imaging / TESTING  5/23/22 CT Head WO Contrast:  Impression   No acute intracranial abnormality.      5/23/22 CT Cervical Spine WO Contrast:  Impression   No acute abnormality of the cervical spine.      5/23/22 CT Thoracic Spine WO Contrast:  Impression   No acute thoracic spine trauma.       8 mm average diameter right upper lobe subpleural pulmonary nodule. Follow-up as clinically warranted according to the Fleischner criteria below.       RECOMMENDATIONS:   8 mm right solid pulmonary nodule within the upper lobe. Recommend a   non-contrast Chest CT at 6-12 months. If patient is high risk for malignancy,   recommend an additional non-contrast Chest CT at 18-24 months; if patient is   low risk for malignancy a non-contrast Chest CT at 18-24 months is optional.   These guidelines do not apply to immunocompromised patients and patients with   cancer. Follow up in patients with significant comorbidities as clinically   warranted.  For lung cancer screening, adhere to Lung-RADS *    Electronically signed by: Electronically signed by Duane Mota.  KIKI Jarquin CNP on 5/27/2022 at 10:03 AM

## 2022-05-27 NOTE — PROGRESS NOTES
Physical Therapy    Physical Therapy Treatment Note  Name: Antonio Conway MRN: [id number] :   1927   Date:  2022   Admission Date: 2022 Room:  [address] [place] [address]   Restrictions/Precautions:          gen prec, fall risk  Communication with other providers:    Subjective:  Patient states:  Agreeable to PT. Confused and has trouble /c yes/no questions at times. Pain:   Location, Type, Intensity (0/10 to 10/10): No complaints of pain  Objective:    Observation:  Supine in bed upon entry. Treatment, including education/measures:  Transfers with line management of tele  Rolling: Analia  Supine to sit :Analia-modA  Sit to stand :Analia  Stand to sit :Analia  SPT:Analia  Hand assist and walker guidance required during stand-pivot transfer using FWW. Sitting Exercises: Ankle pumps x 10  LAQ's x 10  Marching x 5reps bilat-alternating   Hip Abd x 10  Therapeutic Exercise:  Therapeutic exercises were instructed today. Cues were given for technique, safety, recruitment, and rationale. Cues were verbal and/or tactile for proper form. Increased time to complete due to need for further instruction      Gait:  Pt amb with FWW for 6', then 20' with CGA and chair follow. Pt needed VC's for safety and sequencing. Safety  Patient left safely in the chair, with call light/phone in reach with alarm applied. Gait belt and mask were used for transfers and gait.         Assessment / Impression:    Pt limited by confusion during PT session  Patient's tolerance of treatment:  good   Adverse Reaction: na  Significant change in status and impact:  na  Barriers to improvement:  Weakness and confusion  Plan for Next Session:    Cont POC /c focus on gait and transfer improvements  Time in:  1300  Time out:  1328  Timed treatment minutes:  28  Total treatment time:  28    Previously filed items:           Short Term Goals  Time Frame for Short term goals: 1 week  Short term goal 1: pt to complete all bed mobility with supervision  Short term goal 2: Pt to complete all STS to/from bed, commode, and chair SBA  Short term goal 3: Pt to ambulate 150' with LRAD SBA    Electronically signed by:    Veronique Duron, PTA  5/27/2022, 1:42 PM

## 2022-05-27 NOTE — PROGRESS NOTES
Hospital Medicine Progress Note     Date:  5/27/2022    PCP: Re Porter MD (Tel: [phone])    Date of Admission: 5/23/2022    Chief complaint:   Chief Complaint   Patient presents with    Fall       Brief admission history: Pleasant 22-year-old female with history of essential hypertension GERD, dementia (on Aricept), history of left lower extremity DVT (on Xarelto), AAA, lung mass, history of recurrent falls, who presents to the emergency room following fall at [facility] facility. On presentation to the emergency room, she has slightly elevated creatinine kinase (of 499, not otherwise qualified as rhabdomyolysis), mild leukocytosis, elevated lactic acid level. EKG was concerning for atrial fibrillation. During hospital stay, neurology was consulted for evaluation of possible toxic-metabolic encephalopathy; however, following unremarkable MRI-brain, it was later determined confusion could be secondary to urinary tract infection. Abnormal EKG was also evaluated by cardiology, determined to be sinus rhythm with bifascicular block and PACs. She has nonzero troponin, which was determined to be secondary to elevated creatinine kinase, possible demand ischemia (type II NSTEMI); and no further work-up per cardiology. Assessment/plan:  1. Fall at assisted living facility. Suspect possible mechanical.  Maintain on fall precautions. Will need ECF at the time of discharge, pending disposition regarding antibiotics. 2. Polymicrobial bacteremia. Blood cultures with coagulase-negative Staph, E. coli, Klebsiella pneumonia. Suspected to be GI versus  translocation. 1. Patient on vancomycin and cefepime, to continue for now per ID rec. 2. ID on board and will be guiding regarding antibiotic needs for disposition. 3. Sepsis secondary to urinary tract infection, E. coli UTI. Met sepsis criteria on admission based on tachycardia, tachypnea, lactic acidosis.   Lactic acidosis has resolved following hydration. On antibiotics. 4. Patient has nonspecific creatinine kinase, does not otherwise qualify as rhabdomyolysis. Initial creatinine kinase was 499, repeat was 151.  5. Other comorbidities: history of essential hypertension GERD, dementia (on aricept), history of left lower extremity DVT (on Xarelto), AAA, lung mass, history of recurrent falls. 6. Disposition. Pending decision on discharge antibiotics from ID standpoint. Attempted to reach [facility] for [id number] at 09:38AM but no response, let a voicemail for a callback. Diet: ADULT DIET; Regular; Low Fat/Low Chol/High Fiber/2 gm Na    Code status: Full Code   ----------  Subjective  Patient is confused (underlying dementia), has no complaints. Objective  Physical exam:  Vitals: BP (!) 180/97   Pulse 80   Temp 97.6 °F (36.4 °C) (Oral)   Resp 17   Ht 5' (1.524 m)   Wt 129 lb 14.4 oz (58.9 kg)   SpO2 94%   BMI 25.37 kg/m²   Gen/overall appearance: Not in acute distress. Alert. Confused. Head: Normocephalic, atraumatic  Eyes: EOMI, good acuity  ENT: Oral mucosa moist  Neck: No JVD, thyromegaly  CVS: Nml S1S2, no MRG, RRR  Pulm: Clear bilaterally. No crackles/wheezes  Gastrointestinal: Soft, NT/ND, +BS  Musculoskeletal: No edema. Warm  Neuro: No focal deficit. Moves extremity spontaneously. Psychiatry: Appropriate affect. Not agitated. Skin: Warm, dry with normal turgor. No rash  Capillary refill: Brisk,< 3 seconds   Peripheral Pulses: +2 palpable, equal bilaterally      24HR INTAKE/OUTPUT:      Intake/Output Summary (Last 24 hours) at 5/27/2022 0843  Last data filed at 5/27/2022 0654  Gross per 24 hour   Intake 610 ml   Output 1 ml   Net 609 ml     I/O last 3 completed shifts: In: 200 [P.O.:420; IV Piggyback:350]  Out: 1 [Urine:1]  No intake/output data recorded.   Meds:    hydrALAZINE  25 mg Oral 2 times per day    rivaroxaban  20 mg Oral Daily    vancomycin  750 mg IntraVENous Q24H    clotrimazole-betamethasone   Topical BID    cefepime  2,000 mg IntraVENous Q12H    miconazole   Topical BID    sodium chloride flush  5-40 mL IntraVENous 2 times per day    donepezil  5 mg Oral QPM    lisinopril  40 mg Oral Daily    pantoprazole  40 mg Oral QAM AC     Infusions:    sodium chloride 25 mL/hr at 05/27/22 0231     PRN Meds: sodium chloride flush, sodium chloride, ondansetron **OR** ondansetron, polyethylene glycol, acetaminophen **OR** acetaminophen    Labs/imaging:  CBC:   No results for input(s): WBC, HGB, PLT in the last 72 hours.   BMP:    Recent Labs     05/24/22 2107 05/25/22  0943 05/26/22  0504    143 142   K 3.5 3.5 3.9    109 109   CO2 24 22 25   BUN 21 18 14   CREATININE 1.1 1.0 0.8   GLUCOSE 150* 110* 105*     Hepatic:   Recent Labs     05/24/22 2107   AST 19   ALT 11   BILITOT 0.4   ALKPHOS 73       Terri Pino MD  -------------------------------  Rounding hospitalist

## 2022-05-28 PROCEDURE — 6370000000 HC RX 637 (ALT 250 FOR IP): Performed by: INTERNAL MEDICINE

## 2022-05-28 PROCEDURE — 1200000000 HC SEMI PRIVATE

## 2022-05-28 PROCEDURE — 2580000003 HC RX 258: Performed by: NURSE PRACTITIONER

## 2022-05-28 PROCEDURE — 94761 N-INVAS EAR/PLS OXIMETRY MLT: CPT

## 2022-05-28 PROCEDURE — 6370000000 HC RX 637 (ALT 250 FOR IP): Performed by: NURSE PRACTITIONER

## 2022-05-28 PROCEDURE — 2500000003 HC RX 250 WO HCPCS: Performed by: EMERGENCY MEDICINE

## 2022-05-28 RX ADMIN — CHLORHEXIDINE GLUCONATE: 4 LIQUID TOPICAL at 11:03

## 2022-05-28 RX ADMIN — LISINOPRIL 40 MG: 20 TABLET ORAL at 10:51

## 2022-05-28 RX ADMIN — RIVAROXABAN 20 MG: 20 TABLET, FILM COATED ORAL at 16:58

## 2022-05-28 RX ADMIN — MICONAZOLE NITRATE: 2 POWDER TOPICAL at 10:58

## 2022-05-28 RX ADMIN — CLOTRIMAZOLE AND BETAMETHASONE DIPROPIONATE: 10; .5 CREAM TOPICAL at 21:18

## 2022-05-28 RX ADMIN — HYDRALAZINE HYDROCHLORIDE 25 MG: 25 TABLET, FILM COATED ORAL at 10:54

## 2022-05-28 RX ADMIN — CLOTRIMAZOLE AND BETAMETHASONE DIPROPIONATE: 10; .5 CREAM TOPICAL at 10:58

## 2022-05-28 RX ADMIN — DONEPEZIL HYDROCHLORIDE 5 MG: 5 TABLET, FILM COATED ORAL at 21:15

## 2022-05-28 RX ADMIN — MICONAZOLE NITRATE: 2 POWDER TOPICAL at 21:17

## 2022-05-28 RX ADMIN — SODIUM CHLORIDE, PRESERVATIVE FREE 10 ML: 5 INJECTION INTRAVENOUS at 21:16

## 2022-05-28 RX ADMIN — Medication: at 21:15

## 2022-05-28 RX ADMIN — HYDRALAZINE HYDROCHLORIDE 25 MG: 25 TABLET, FILM COATED ORAL at 21:15

## 2022-05-28 RX ADMIN — SULFAMETHOXAZOLE AND TRIMETHOPRIM 1 TABLET: 800; 160 TABLET ORAL at 10:54

## 2022-05-28 RX ADMIN — Medication: at 10:58

## 2022-05-28 RX ADMIN — SULFAMETHOXAZOLE AND TRIMETHOPRIM 1 TABLET: 800; 160 TABLET ORAL at 21:15

## 2022-05-28 NOTE — PROGRESS NOTES
Hospital Medicine Progress Note     Date:  5/28/2022    PCP: Snehal Porter MD (Tel: [phone])    Date of Admission: 5/23/2022    Chief complaint:   Chief Complaint   Patient presents with    Fall       Brief admission history: Pleasant 66-year-old female with history of essential hypertension GERD, dementia (on Aricept), history of left lower extremity DVT (on Xarelto), AAA, lung mass, history of recurrent falls, who presents to the emergency room following fall at [facility] facility. On presentation to the emergency room, she has slightly elevated creatinine kinase (of 499, not otherwise qualified as rhabdomyolysis), mild leukocytosis, elevated lactic acid level. EKG was concerning for atrial fibrillation. During hospital stay, neurology was consulted for evaluation of possible toxic-metabolic encephalopathy; however, following unremarkable MRI-brain, it was later determined confusion could be secondary to urinary tract infection. Abnormal EKG was also evaluated by cardiology, determined to be sinus rhythm with bifascicular block and PACs. She has nonzero troponin, which was determined to be secondary to elevated creatinine kinase, possible demand ischemia (type II NSTEMI); and no further work-up per cardiology. Assessment/plan:  1. Fall at assisted living facility. Suspect possible mechanical.  Maintain on fall precautions. Therapy has recommended SNF at discharge. [id number] completed on 5/27/2022; disposition from [facility] is that therapy needs to document and justify patient's needs for SNF. 2. Polymicrobial bacteremia. Blood cultures with coagulase-negative Staph, E. coli, Klebsiella pneumonia. Suspected to be GI versus  translocation. Completed course of vancomycin and cefepime on 5/27/2022. ID has recommended oral Bactrim started on 5/27/2022, to complete 14-day course on June 9, 2022, monitor potassium closely  1.  Will touch base with ID regarding Bactrim use prior to discharge. 3. Sepsis secondary to urinary tract infection, E. coli UTI. Met sepsis criteria on admission based on tachycardia, tachypnea, lactic acidosis. Lactic acidosis has resolved following hydration. 4. Patient has nonspecific creatinine kinase, does not otherwise qualify as rhabdomyolysis. Initial creatinine kinase was 499, repeat was 151.  5. Other comorbidities: history of essential hypertension GERD, dementia (on aricept), history of left lower extremity DVT (on Xarelto), AAA, lung mass, history of recurrent falls. Diet: ADULT DIET; Regular; Low Fat/Low Chol/High Fiber/2 gm Na    Code status: Full Code   ----------  Subjective  Patient has underlying dementia, has no complaints. Objective  Physical exam:  Vitals: BP (!) 140/76   Pulse 79   Temp 98.1 °F (36.7 °C) (Axillary)   Resp 26   Ht 5' (1.524 m)   Wt 129 lb 14.4 oz (58.9 kg)   SpO2 95%   BMI 25.37 kg/m²   Gen/overall appearance: Not in acute distress. Alert. Confused. Head: Normocephalic, atraumatic  Eyes: EOMI, good acuity  ENT: Oral mucosa moist  Neck: No JVD, thyromegaly  CVS: Nml S1S2, no MRG, RRR  Pulm: Clear bilaterally. No crackles/wheezes  Gastrointestinal: Soft, NT/ND, +BS  Musculoskeletal: No edema. Warm  Neuro: No focal deficit. Moves extremity spontaneously. Psychiatry: Appropriate affect. Not agitated. Skin: Warm, dry with normal turgor. No rash  Capillary refill: Brisk,< 3 seconds   Peripheral Pulses: +2 palpable, equal bilaterally      24HR INTAKE/OUTPUT:      Intake/Output Summary (Last 24 hours) at 5/28/2022 1330  Last data filed at 5/27/2022 1749  Gross per 24 hour   Intake 60 ml   Output --   Net 60 ml     I/O last 3 completed shifts: In: 26 [P.O.:120; IV Piggyback:350]  Out: -   No intake/output data recorded.   Meds:    hydrALAZINE  25 mg Oral 2 times per day    sulfamethoxazole-trimethoprim  1 tablet Oral 2 times per day    mupirocin   Nasal BID    chlorhexidine gluconate   Topical Daily    rivaroxaban 20 mg Oral Daily    clotrimazole-betamethasone   Topical BID    miconazole   Topical BID    sodium chloride flush  5-40 mL IntraVENous 2 times per day    donepezil  5 mg Oral QPM    lisinopril  40 mg Oral Daily    pantoprazole  40 mg Oral QAM AC     Infusions:    sodium chloride 25 mL/hr at 05/27/22 0231     PRN Meds: labetalol, sodium chloride flush, sodium chloride, ondansetron **OR** ondansetron, polyethylene glycol, acetaminophen **OR** acetaminophen    Labs/imaging:  CBC:   No results for input(s): WBC, HGB, PLT in the last 72 hours. BMP:    Recent Labs     05/26/22  0504      K 3.9      CO2 25   BUN 14   CREATININE 0.8   GLUCOSE 105*     Hepatic:   No results for input(s): AST, ALT, ALB, BILITOT, ALKPHOS in the last 72 hours.     Cassandra Fitzgerald MD  -------------------------------  [facility]

## 2022-05-28 NOTE — PROGRESS NOTES
Pt eating has decreased unless fed by nurse/tech. Pt seems unable to do basic activities and was very upset when offered to eat more or get out of bed. Pt stated she just wanted to lay in bed. Pt oriented to herself \"Kimmie\" but not oriented to everything else in the situation, place and time. Pt seems to move fine when out of bed but seems to need more involvement in care. Report passed off to [employer].

## 2022-05-28 NOTE — PLAN OF CARE
Problem: Discharge Planning  Goal: Discharge to home or other facility with appropriate resources  Outcome: Progressing     Problem: Safety - Adult  Goal: Free from fall injury  Outcome: Progressing     Problem: Skin/Tissue Integrity  Goal: Absence of new skin breakdown  Description: 1. Monitor for areas of redness and/or skin breakdown  2. Assess vascular access sites hourly  3. Every 4-6 hours minimum:  Change oxygen saturation probe site  4. Every 4-6 hours:  If on nasal continuous positive airway pressure, respiratory therapy assess nares and determine need for appliance change or resting period. Outcome: Progressing     Problem: ABCDS Injury Assessment  Goal: Absence of physical injury  Outcome: Progressing     Problem: Discharge Planning  Goal: Discharge to home or other facility with appropriate resources  Outcome: Progressing     Problem: Safety - Adult  Goal: Free from fall injury  Outcome: Progressing     Problem: Skin/Tissue Integrity  Goal: Absence of new skin breakdown  Description: 1. Monitor for areas of redness and/or skin breakdown  2. Assess vascular access sites hourly  3. Every 4-6 hours minimum:  Change oxygen saturation probe site  4. Every 4-6 hours:  If on nasal continuous positive airway pressure, respiratory therapy assess nares and determine need for appliance change or resting period.   Outcome: Progressing     Problem: ABCDS Injury Assessment  Goal: Absence of physical injury  Outcome: Progressing

## 2022-05-28 NOTE — PLAN OF CARE
Problem: Discharge Planning  Goal: Discharge to home or other facility with appropriate resources  5/28/2022 0927 by Tamy Garcia RN  Outcome: Progressing  5/28/2022 0003 by Eren Torres RN  Outcome: Progressing     Problem: Safety - Adult  Goal: Free from fall injury  5/28/2022 0927 by Tamy Garcia RN  Outcome: Progressing  5/28/2022 0003 by Eren Torres RN  Outcome: Progressing     Problem: Skin/Tissue Integrity  Goal: Absence of new skin breakdown  Description: 1. Monitor for areas of redness and/or skin breakdown  2. Assess vascular access sites hourly  3. Every 4-6 hours minimum:  Change oxygen saturation probe site  4. Every 4-6 hours:  If on nasal continuous positive airway pressure, respiratory therapy assess nares and determine need for appliance change or resting period.   5/28/2022 4898 by Tamy Garcia RN  Outcome: Progressing  5/28/2022 0003 by Eren Torres RN  Outcome: Progressing     Problem: ABCDS Injury Assessment  Goal: Absence of physical injury  5/28/2022 5023 by Tamy Garcia RN  Outcome: Progressing  5/28/2022 0003 by Eren Torres RN  Outcome: Progressing

## 2022-05-29 LAB — PROCALCITONIN: 0.03

## 2022-05-29 PROCEDURE — 6370000000 HC RX 637 (ALT 250 FOR IP): Performed by: INTERNAL MEDICINE

## 2022-05-29 PROCEDURE — 2500000003 HC RX 250 WO HCPCS: Performed by: EMERGENCY MEDICINE

## 2022-05-29 PROCEDURE — 2580000003 HC RX 258: Performed by: NURSE PRACTITIONER

## 2022-05-29 PROCEDURE — 84145 PROCALCITONIN (PCT): CPT

## 2022-05-29 PROCEDURE — 6370000000 HC RX 637 (ALT 250 FOR IP): Performed by: NURSE PRACTITIONER

## 2022-05-29 PROCEDURE — 1200000000 HC SEMI PRIVATE

## 2022-05-29 PROCEDURE — 36415 COLL VENOUS BLD VENIPUNCTURE: CPT

## 2022-05-29 PROCEDURE — 94761 N-INVAS EAR/PLS OXIMETRY MLT: CPT

## 2022-05-29 RX ADMIN — DONEPEZIL HYDROCHLORIDE 5 MG: 5 TABLET, FILM COATED ORAL at 21:45

## 2022-05-29 RX ADMIN — LISINOPRIL 40 MG: 20 TABLET ORAL at 10:10

## 2022-05-29 RX ADMIN — PANTOPRAZOLE SODIUM 40 MG: 40 TABLET, DELAYED RELEASE ORAL at 05:25

## 2022-05-29 RX ADMIN — SULFAMETHOXAZOLE AND TRIMETHOPRIM 1 TABLET: 800; 160 TABLET ORAL at 21:45

## 2022-05-29 RX ADMIN — HYDRALAZINE HYDROCHLORIDE 25 MG: 25 TABLET, FILM COATED ORAL at 21:45

## 2022-05-29 RX ADMIN — MICONAZOLE NITRATE: 2 POWDER TOPICAL at 10:12

## 2022-05-29 RX ADMIN — CLOTRIMAZOLE AND BETAMETHASONE DIPROPIONATE: 10; .5 CREAM TOPICAL at 10:11

## 2022-05-29 RX ADMIN — SULFAMETHOXAZOLE AND TRIMETHOPRIM 1 TABLET: 800; 160 TABLET ORAL at 10:11

## 2022-05-29 RX ADMIN — RIVAROXABAN 20 MG: 20 TABLET, FILM COATED ORAL at 17:04

## 2022-05-29 RX ADMIN — HYDRALAZINE HYDROCHLORIDE 25 MG: 25 TABLET, FILM COATED ORAL at 10:11

## 2022-05-29 RX ADMIN — SODIUM CHLORIDE, PRESERVATIVE FREE 10 ML: 5 INJECTION INTRAVENOUS at 22:18

## 2022-05-29 RX ADMIN — ACETAMINOPHEN 650 MG: 325 TABLET ORAL at 21:45

## 2022-05-29 RX ADMIN — SODIUM CHLORIDE, PRESERVATIVE FREE 10 ML: 5 INJECTION INTRAVENOUS at 10:11

## 2022-05-29 RX ADMIN — CHLORHEXIDINE GLUCONATE: 4 LIQUID TOPICAL at 13:07

## 2022-05-29 ASSESSMENT — PAIN DESCRIPTION - ORIENTATION: ORIENTATION: MID

## 2022-05-29 ASSESSMENT — PAIN DESCRIPTION - LOCATION: LOCATION: OTHER (COMMENT)

## 2022-05-29 ASSESSMENT — PAIN SCALES - GENERAL: PAINLEVEL_OUTOF10: 2

## 2022-05-29 ASSESSMENT — PAIN - FUNCTIONAL ASSESSMENT: PAIN_FUNCTIONAL_ASSESSMENT: ACTIVITIES ARE NOT PREVENTED

## 2022-05-29 ASSESSMENT — PAIN SCALES - WONG BAKER: WONGBAKER_NUMERICALRESPONSE: 0

## 2022-05-29 ASSESSMENT — PAIN DESCRIPTION - DESCRIPTORS: DESCRIPTORS: ACHING

## 2022-05-29 NOTE — PLAN OF CARE
Problem: Discharge Planning  Goal: Discharge to home or other facility with appropriate resources  5/29/2022 1517 by Saurabh Koch RN  Outcome: Progressing  5/29/2022 1516 by Saurabh Koch RN  Outcome: Progressing     Problem: Safety - Adult  Goal: Free from fall injury  5/29/2022 1517 by Saurabh Koch RN  Outcome: Progressing  5/29/2022 1516 by Saurabh Koch RN  Outcome: Progressing     Problem: Skin/Tissue Integrity  Goal: Absence of new skin breakdown  Description: 1. Monitor for areas of redness and/or skin breakdown  2. Assess vascular access sites hourly  3. Every 4-6 hours minimum:  Change oxygen saturation probe site  4. Every 4-6 hours:  If on nasal continuous positive airway pressure, respiratory therapy assess nares and determine need for appliance change or resting period.   5/29/2022 1517 by Saurabh Koch RN  Outcome: Progressing  5/29/2022 1516 by Saurabh Koch RN  Outcome: Progressing     Problem: ABCDS Injury Assessment  Goal: Absence of physical injury  5/29/2022 1517 by Saurabh Koch RN  Outcome: Progressing  5/29/2022 1516 by Saurabh Koch RN  Outcome: Progressing

## 2022-05-29 NOTE — PROGRESS NOTES
Hospital Medicine Progress Note     Date:  5/29/2022    PCP: Broderick Porter MD (Tel: [phone])    Date of Admission: 5/23/2022    Chief complaint:   Chief Complaint   Patient presents with    Fall       Brief admission history: Pleasant 77-year-old female with history of essential hypertension GERD, dementia (on Aricept), history of left lower extremity DVT (on Xarelto), AAA, lung mass, history of recurrent falls, who presents to the emergency room following fall at [facility] facility. On presentation to the emergency room, she has slightly elevated creatinine kinase (of 499, not otherwise qualified as rhabdomyolysis), mild leukocytosis, elevated lactic acid level. EKG was concerning for atrial fibrillation. During hospital stay, neurology was consulted for evaluation of possible toxic-metabolic encephalopathy; however, following unremarkable MRI-brain, it was later determined confusion could be secondary to urinary tract infection. Abnormal EKG was also evaluated by cardiology, determined to be sinus rhythm with bifascicular block and PACs. She has nonzero troponin, which was determined to be secondary to elevated creatinine kinase, possible demand ischemia (type II NSTEMI); and no further work-up per cardiology. Assessment/plan:  1. Fall at assisted living facility. Suspect possible mechanical.  Maintain on fall precautions. Therapy has recommended SNF at discharge. [id number] completed on 5/27/2022; disposition from [facility] is that therapy needs to document and justify patient's needs for SNF. 2. Polymicrobial bacteremia. Blood cultures with coagulase-negative Staph, E. coli, Klebsiella pneumonia. Suspected to be GI versus  translocation. Completed course of vancomycin and cefepime on 5/27/2022. ID has recommended oral Bactrim started on 5/27/2022, to complete 14-day course on June 9, 2022, monitor potassium closely  1.  Will touch base with ID regarding Bactrim use prior to discharge. 3. Sepsis secondary to urinary tract infection, E. coli UTI. Met sepsis criteria on admission based on tachycardia, tachypnea, lactic acidosis. Lactic acidosis has resolved following hydration. 4. Patient has nonspecific creatinine kinase, does not otherwise qualify as rhabdomyolysis. Initial creatinine kinase was 499, repeat was 151.  5. Other comorbidities: history of essential hypertension GERD, dementia (on aricept), history of left lower extremity DVT (on Xarelto), AAA, lung mass, history of recurrent falls. Diet: ADULT DIET; Regular; Low Fat/Low Chol/High Fiber/2 gm Na    Code status: Full Code   ----------  Subjective  No new complaints. She is confused/dementia. Objective  Physical exam:  Vitals: BP (!) 145/89   Pulse 79   Temp 97.4 °F (36.3 °C) (Oral)   Resp 23   Ht 5' (1.524 m)   Wt 129 lb 14.4 oz (58.9 kg)   SpO2 91%   BMI 25.37 kg/m²   Gen/overall appearance: Not in acute distress. Alert. Confused. Head: Normocephalic, atraumatic  Eyes: EOMI, good acuity  ENT: Oral mucosa moist  Neck: No JVD, thyromegaly  CVS: Nml S1S2, no MRG, RRR  Pulm: Clear bilaterally. No crackles/wheezes  Gastrointestinal: Soft, NT/ND, +BS  Musculoskeletal: No edema. Warm  Neuro: No focal deficit. Moves extremity spontaneously. Psychiatry: Appropriate affect. Not agitated. Skin: Warm, dry with normal turgor. No rash  Capillary refill: Brisk,< 3 seconds   Peripheral Pulses: +2 palpable, equal bilaterally      24HR INTAKE/OUTPUT:    No intake or output data in the 24 hours ending 05/29/22 0810  No intake/output data recorded. No intake/output data recorded.   Meds:    hydrALAZINE  25 mg Oral 2 times per day    sulfamethoxazole-trimethoprim  1 tablet Oral 2 times per day    mupirocin   Nasal BID    chlorhexidine gluconate   Topical Daily    rivaroxaban  20 mg Oral Daily    clotrimazole-betamethasone   Topical BID    miconazole   Topical BID    sodium chloride flush  5-40 mL IntraVENous 2 times per day    donepezil  5 mg Oral QPM    lisinopril  40 mg Oral Daily    pantoprazole  40 mg Oral QAM AC     Infusions:    sodium chloride 25 mL/hr at 05/27/22 0231     PRN Meds: labetalol, sodium chloride flush, sodium chloride, ondansetron **OR** ondansetron, polyethylene glycol, acetaminophen **OR** acetaminophen    Labs/imaging:  CBC:   No results for input(s): WBC, HGB, PLT in the last 72 hours. BMP:    No results for input(s): NA, K, CL, CO2, BUN, CREATININE, GLUCOSE in the last 72 hours. Hepatic:   No results for input(s): AST, ALT, ALB, BILITOT, ALKPHOS in the last 72 hours.     Mary Gracia MD  -------------------------------  [facility] hospitalist

## 2022-05-30 LAB
ALBUMIN SERPL-MCNC: 3.5 GM/DL (ref 3.4–5)
ALP BLD-CCNC: 79 IU/L (ref 40–129)
ALT SERPL-CCNC: 11 U/L (ref 10–40)
ANION GAP SERPL CALCULATED.3IONS-SCNC: 13 MMOL/L (ref 4–16)
AST SERPL-CCNC: 14 IU/L (ref 15–37)
BASOPHILS ABSOLUTE: 0.1 K/CU MM
BASOPHILS RELATIVE PERCENT: 0.9 % (ref 0–1)
BILIRUB SERPL-MCNC: 0.4 MG/DL (ref 0–1)
BUN BLDV-MCNC: 17 MG/DL (ref 6–23)
CALCIUM SERPL-MCNC: 8.8 MG/DL (ref 8.3–10.6)
CHLORIDE BLD-SCNC: 105 MMOL/L (ref 99–110)
CO2: 22 MMOL/L (ref 21–32)
CREAT SERPL-MCNC: 1.1 MG/DL (ref 0.6–1.1)
CULTURE: NORMAL
CULTURE: NORMAL
DIFFERENTIAL TYPE: ABNORMAL
EOSINOPHILS ABSOLUTE: 0.3 K/CU MM
EOSINOPHILS RELATIVE PERCENT: 3.6 % (ref 0–3)
GFR AFRICAN AMERICAN: 56 ML/MIN/1.73M2
GFR NON-AFRICAN AMERICAN: 46 ML/MIN/1.73M2
GLUCOSE BLD-MCNC: 99 MG/DL (ref 70–99)
HCT VFR BLD CALC: 46.8 % (ref 37–47)
HEMOGLOBIN: 14.5 GM/DL (ref 12.5–16)
IMMATURE NEUTROPHIL %: 0.9 % (ref 0–0.43)
LYMPHOCYTES ABSOLUTE: 1.5 K/CU MM
LYMPHOCYTES RELATIVE PERCENT: 21 % (ref 24–44)
Lab: NORMAL
Lab: NORMAL
MAGNESIUM: 2.4 MG/DL (ref 1.8–2.4)
MCH RBC QN AUTO: 28.5 PG (ref 27–31)
MCHC RBC AUTO-ENTMCNC: 31 % (ref 32–36)
MCV RBC AUTO: 92.1 FL (ref 78–100)
MONOCYTES ABSOLUTE: 0.8 K/CU MM
MONOCYTES RELATIVE PERCENT: 11 % (ref 0–4)
NUCLEATED RBC %: 0 %
PDW BLD-RTO: 14.2 % (ref 11.7–14.9)
PHOSPHORUS: 3.6 MG/DL (ref 2.5–4.9)
PLATELET # BLD: 259 K/CU MM (ref 140–440)
PMV BLD AUTO: 10.5 FL (ref 7.5–11.1)
POTASSIUM SERPL-SCNC: 4.5 MMOL/L (ref 3.5–5.1)
RBC # BLD: 5.08 M/CU MM (ref 4.2–5.4)
SEGMENTED NEUTROPHILS ABSOLUTE COUNT: 4.4 K/CU MM
SEGMENTED NEUTROPHILS RELATIVE PERCENT: 62.6 % (ref 36–66)
SODIUM BLD-SCNC: 140 MMOL/L (ref 135–145)
SPECIMEN: NORMAL
SPECIMEN: NORMAL
TOTAL IMMATURE NEUTOROPHIL: 0.06 K/CU MM
TOTAL NUCLEATED RBC: 0 K/CU MM
TOTAL PROTEIN: 6.1 GM/DL (ref 6.4–8.2)
WBC # BLD: 7 K/CU MM (ref 4–10.5)

## 2022-05-30 PROCEDURE — 2580000003 HC RX 258: Performed by: NURSE PRACTITIONER

## 2022-05-30 PROCEDURE — 1200000000 HC SEMI PRIVATE

## 2022-05-30 PROCEDURE — 85025 COMPLETE CBC W/AUTO DIFF WBC: CPT

## 2022-05-30 PROCEDURE — 84100 ASSAY OF PHOSPHORUS: CPT

## 2022-05-30 PROCEDURE — 83735 ASSAY OF MAGNESIUM: CPT

## 2022-05-30 PROCEDURE — 2500000003 HC RX 250 WO HCPCS: Performed by: EMERGENCY MEDICINE

## 2022-05-30 PROCEDURE — 6370000000 HC RX 637 (ALT 250 FOR IP): Performed by: NURSE PRACTITIONER

## 2022-05-30 PROCEDURE — 6370000000 HC RX 637 (ALT 250 FOR IP): Performed by: INTERNAL MEDICINE

## 2022-05-30 PROCEDURE — 94761 N-INVAS EAR/PLS OXIMETRY MLT: CPT

## 2022-05-30 PROCEDURE — 80053 COMPREHEN METABOLIC PANEL: CPT

## 2022-05-30 PROCEDURE — 36415 COLL VENOUS BLD VENIPUNCTURE: CPT

## 2022-05-30 RX ORDER — BISACODYL 10 MG
10 SUPPOSITORY, RECTAL RECTAL DAILY PRN
Status: DISCONTINUED | OUTPATIENT
Start: 2022-05-30 | End: 2022-06-03 | Stop reason: HOSPADM

## 2022-05-30 RX ORDER — POLYETHYLENE GLYCOL 3350 17 G/17G
17 POWDER, FOR SOLUTION ORAL ONCE
Status: COMPLETED | OUTPATIENT
Start: 2022-05-30 | End: 2022-05-30

## 2022-05-30 RX ORDER — BISACODYL 10 MG
10 SUPPOSITORY, RECTAL RECTAL ONCE
Status: COMPLETED | OUTPATIENT
Start: 2022-05-30 | End: 2022-05-30

## 2022-05-30 RX ADMIN — CLOTRIMAZOLE AND BETAMETHASONE DIPROPIONATE: 10; .5 CREAM TOPICAL at 09:50

## 2022-05-30 RX ADMIN — MICONAZOLE NITRATE: 2 POWDER TOPICAL at 22:40

## 2022-05-30 RX ADMIN — RIVAROXABAN 20 MG: 20 TABLET, FILM COATED ORAL at 16:53

## 2022-05-30 RX ADMIN — LISINOPRIL 40 MG: 20 TABLET ORAL at 09:49

## 2022-05-30 RX ADMIN — SULFAMETHOXAZOLE AND TRIMETHOPRIM 1 TABLET: 800; 160 TABLET ORAL at 09:49

## 2022-05-30 RX ADMIN — CHLORHEXIDINE GLUCONATE: 4 LIQUID TOPICAL at 09:50

## 2022-05-30 RX ADMIN — MICONAZOLE NITRATE: 2 POWDER TOPICAL at 09:49

## 2022-05-30 RX ADMIN — SULFAMETHOXAZOLE AND TRIMETHOPRIM 1 TABLET: 800; 160 TABLET ORAL at 21:39

## 2022-05-30 RX ADMIN — PANTOPRAZOLE SODIUM 40 MG: 40 TABLET, DELAYED RELEASE ORAL at 06:19

## 2022-05-30 RX ADMIN — DONEPEZIL HYDROCHLORIDE 5 MG: 5 TABLET, FILM COATED ORAL at 21:39

## 2022-05-30 RX ADMIN — SODIUM CHLORIDE, PRESERVATIVE FREE 10 ML: 5 INJECTION INTRAVENOUS at 09:51

## 2022-05-30 RX ADMIN — BISACODYL 10 MG: 10 SUPPOSITORY RECTAL at 10:11

## 2022-05-30 RX ADMIN — HYDRALAZINE HYDROCHLORIDE 25 MG: 25 TABLET, FILM COATED ORAL at 09:49

## 2022-05-30 RX ADMIN — CLOTRIMAZOLE AND BETAMETHASONE DIPROPIONATE: 10; .5 CREAM TOPICAL at 22:40

## 2022-05-30 RX ADMIN — POLYETHYLENE GLYCOL (3350) 17 G: 17 POWDER, FOR SOLUTION ORAL at 10:15

## 2022-05-30 RX ADMIN — HYDRALAZINE HYDROCHLORIDE 25 MG: 25 TABLET, FILM COATED ORAL at 21:38

## 2022-05-30 RX ADMIN — SODIUM CHLORIDE, PRESERVATIVE FREE 10 ML: 5 INJECTION INTRAVENOUS at 21:43

## 2022-05-30 ASSESSMENT — PAIN SCALES - GENERAL
PAINLEVEL_OUTOF10: 0
PAINLEVEL_OUTOF10: 0

## 2022-05-30 ASSESSMENT — PAIN SCALES - WONG BAKER
WONGBAKER_NUMERICALRESPONSE: 0
WONGBAKER_NUMERICALRESPONSE: 0

## 2022-05-30 NOTE — PROGRESS NOTES
Hospital Medicine Progress Note     Date:  5/30/2022    PCP: Cathy Porter MD (Tel: [phone])    Date of Admission: 5/23/2022    Chief complaint:   Chief Complaint   Patient presents with    Fall       Brief admission history: Pleasant 80-year-old female with history of essential hypertension GERD, dementia (on Aricept), history of left lower extremity DVT (on Xarelto), AAA, lung mass, history of recurrent falls, who presents to the emergency room following fall at [facility] facility. On presentation to the emergency room, she has slightly elevated creatinine kinase (of 499, not otherwise qualified as rhabdomyolysis), mild leukocytosis, elevated lactic acid level. EKG was concerning for atrial fibrillation. During hospital stay, neurology was consulted for evaluation of possible toxic-metabolic encephalopathy; however, following unremarkable MRI-brain, it was later determined confusion could be secondary to urinary tract infection. Abnormal EKG was also evaluated by cardiology, determined to be sinus rhythm with bifascicular block and PACs. She has nonzero troponin, which was determined to be secondary to elevated creatinine kinase, possible demand ischemia (type II NSTEMI); and no further work-up per cardiology. Assessment/plan:  1. Fall at assisted living facility. Suspect possible mechanical.  Maintain on fall precautions. Therapy has recommended SNF at discharge. [id number] completed on 5/27/2022; disposition from [facility] is that therapy needs to document and justify patient's needs for SNF. 2. Polymicrobial bacteremia. Blood cultures with coagulase-negative Staph, E. coli, Klebsiella pneumonia. Suspected to be GI versus  translocation. Completed course of vancomycin and cefepime on 5/27/2022. ID has recommended oral Bactrim started on 5/27/2022, to complete 14-day course on June 9, 2022, monitor potassium closely  1.  Please touch base with ID regarding bactrim use prior to discharge. 2. Could patient be discharged on oral cephalosporin? 3. Sepsis secondary to urinary tract infection, E. coli UTI. Met sepsis criteria on admission based on tachycardia, tachypnea, lactic acidosis. Lactic acidosis has resolved following hydration. 4. Patient has nonspecific creatinine kinase, does not otherwise qualify as rhabdomyolysis. Initial creatinine kinase was 499, repeat was 151.  5. Constipation. Abdominal exam is benign. Bowel regimen in place - d/w nursing staff. 6. Other comorbidities: history of essential hypertension GERD, dementia (on aricept), history of left lower extremity DVT (on Xarelto), AAA, lung mass, history of recurrent falls. Diet: ADULT DIET; Regular; Low Fat/Low Chol/High Fiber/2 gm Na    Code status: Full Code   ----------  Subjective  No new complaints. She is confused/dementia. States \"I'm just not well. \" No recent bowel movement. Objective  Physical exam:  Vitals: /73   Pulse 84   Temp 98 °F (36.7 °C) (Axillary)   Resp 18   Ht 5' (1.524 m)   Wt 138 lb (62.6 kg)   SpO2 92%   BMI 26.95 kg/m²   Gen/overall appearance: Not in acute distress. Alert. Confused. Head: Normocephalic, atraumatic  Eyes: EOMI, good acuity  ENT: Oral mucosa moist  Neck: No JVD, thyromegaly  CVS: Nml S1S2, no MRG, RRR  Pulm: Clear bilaterally. No crackles/wheezes  Gastrointestinal: Soft, NT/ND, +BS  Musculoskeletal: No edema. Warm  Neuro: No focal deficit. Moves extremity spontaneously. Psychiatry: Appropriate affect. Not agitated. Skin: Warm, dry with normal turgor. No rash  Capillary refill: Brisk,< 3 seconds   Peripheral Pulses: +2 palpable, equal bilaterally      24HR INTAKE/OUTPUT:      Intake/Output Summary (Last 24 hours) at 5/30/2022 0871  Last data filed at 5/29/2022 1980  Gross per 24 hour   Intake 85 ml   Output --   Net 85 ml     I/O last 3 completed shifts: In: 80 [P.O.:75; I.V.:10]  Out: -   No intake/output data recorded.   Meds:    hydrALAZINE  25 mg Oral 2 times per day    sulfamethoxazole-trimethoprim  1 tablet Oral 2 times per day    chlorhexidine gluconate   Topical Daily    rivaroxaban  20 mg Oral Daily    clotrimazole-betamethasone   Topical BID    miconazole   Topical BID    sodium chloride flush  5-40 mL IntraVENous 2 times per day    donepezil  5 mg Oral QPM    lisinopril  40 mg Oral Daily    pantoprazole  40 mg Oral QAM AC     Infusions:    sodium chloride 25 mL/hr at 05/27/22 0231     PRN Meds: labetalol, sodium chloride flush, sodium chloride, ondansetron **OR** ondansetron, polyethylene glycol, acetaminophen **OR** acetaminophen    Labs/imaging:  CBC:   Recent Labs     05/30/22 0132   WBC 7.0   HGB 14.5        BMP:    Recent Labs     05/30/22 0132      K 4.5      CO2 22   BUN 17   CREATININE 1.1   GLUCOSE 99     Hepatic:   Recent Labs     05/30/22 0132   AST 14*   ALT 11   BILITOT 0.4   ALKPHOS 79       Beatrice Dos Santos MD  -------------------------------  [facility]

## 2022-05-30 NOTE — PROGRESS NOTES
Patient appears to be constipated. She has as needed GlycoLax and placed, ordered since 5/23/2022 and she has not received a single dose. Will place order for scheduled MiraLAX and Dulcolax suppository x1 today.

## 2022-05-30 NOTE — PROGRESS NOTES
Tap water enema performed with HARRIET Candelario. Pt did not like the enema and did not tolerate it well as she tossed and turned in bed. Sarahmian Longest water came out with little clumps of poop. Pt expressed her dislike in me and kept questioning why we are doing what we do. She said she didn't understand why she was here and this nurse reoriented her to her situation and to the fact she hasn't pooped in a couple days and had abd pain. This nurse sat by and answered every question she asked until pt told me that I could leave.  Will continue to monitor

## 2022-05-30 NOTE — PROGRESS NOTES
Paged Dr. Yamila Jimenez about abd pain this morning and was ordered supp and Glycolax. Suppository given and pt did not tolerate well. Voice was raised and patient squirmed the whole time. Tolerated about half of suppository before fully turning so she could squeeze out supp. Glycolax given and pt seemed to be eager to take other things so she doesn't have to \"go through that again. \"  Dr. Yamila Jimenez informed me Miralax was ordered since 5/23/22 and not given a single time. Will give Miralax as tolerated. Also will give tap water enema at 4pm if no bowel movement by that time as requested by Dr. Yamila Jimenez.  Will continue to moniotr

## 2022-05-31 PROCEDURE — 97530 THERAPEUTIC ACTIVITIES: CPT

## 2022-05-31 PROCEDURE — 97116 GAIT TRAINING THERAPY: CPT

## 2022-05-31 PROCEDURE — 97535 SELF CARE MNGMENT TRAINING: CPT

## 2022-05-31 PROCEDURE — 6370000000 HC RX 637 (ALT 250 FOR IP): Performed by: NURSE PRACTITIONER

## 2022-05-31 PROCEDURE — 6370000000 HC RX 637 (ALT 250 FOR IP): Performed by: INTERNAL MEDICINE

## 2022-05-31 PROCEDURE — 2500000003 HC RX 250 WO HCPCS: Performed by: EMERGENCY MEDICINE

## 2022-05-31 PROCEDURE — 2580000003 HC RX 258: Performed by: NURSE PRACTITIONER

## 2022-05-31 PROCEDURE — 1200000000 HC SEMI PRIVATE

## 2022-05-31 RX ADMIN — SULFAMETHOXAZOLE AND TRIMETHOPRIM 1 TABLET: 800; 160 TABLET ORAL at 08:56

## 2022-05-31 RX ADMIN — MICONAZOLE NITRATE: 2 POWDER TOPICAL at 20:47

## 2022-05-31 RX ADMIN — LISINOPRIL 40 MG: 20 TABLET ORAL at 08:56

## 2022-05-31 RX ADMIN — CHLORHEXIDINE GLUCONATE: 4 LIQUID TOPICAL at 08:56

## 2022-05-31 RX ADMIN — PANTOPRAZOLE SODIUM 40 MG: 40 TABLET, DELAYED RELEASE ORAL at 06:11

## 2022-05-31 RX ADMIN — SODIUM CHLORIDE, PRESERVATIVE FREE 10 ML: 5 INJECTION INTRAVENOUS at 20:49

## 2022-05-31 RX ADMIN — CLOTRIMAZOLE AND BETAMETHASONE DIPROPIONATE: 10; .5 CREAM TOPICAL at 20:48

## 2022-05-31 RX ADMIN — RIVAROXABAN 20 MG: 20 TABLET, FILM COATED ORAL at 16:32

## 2022-05-31 RX ADMIN — HYDRALAZINE HYDROCHLORIDE 25 MG: 25 TABLET, FILM COATED ORAL at 08:56

## 2022-05-31 RX ADMIN — SODIUM CHLORIDE, PRESERVATIVE FREE 10 ML: 5 INJECTION INTRAVENOUS at 08:57

## 2022-05-31 RX ADMIN — DONEPEZIL HYDROCHLORIDE 5 MG: 5 TABLET, FILM COATED ORAL at 20:53

## 2022-05-31 RX ADMIN — CLOTRIMAZOLE AND BETAMETHASONE DIPROPIONATE: 10; .5 CREAM TOPICAL at 08:57

## 2022-05-31 RX ADMIN — MICONAZOLE NITRATE: 2 POWDER TOPICAL at 08:56

## 2022-05-31 RX ADMIN — SULFAMETHOXAZOLE AND TRIMETHOPRIM 1 TABLET: 800; 160 TABLET ORAL at 20:47

## 2022-05-31 ASSESSMENT — PAIN SCALES - GENERAL: PAINLEVEL_OUTOF10: 0

## 2022-05-31 ASSESSMENT — PAIN SCALES - WONG BAKER
WONGBAKER_NUMERICALRESPONSE: 0
WONGBAKER_NUMERICALRESPONSE: 0

## 2022-05-31 NOTE — CARE COORDINATION
LSW spoke ange De La Torre with [employer] and pt denial was upheld. Wil stated they can start an appeal if the pt is in agreement. Pt is confused at this time. Pt is a max assist and was living in the independent living at [facility]. LSW called pt Cousin who is her next of kin and he would like for [employer] to start the appeal process. LSW informed Taz Smiley that family is wanting to start the appeal process.

## 2022-05-31 NOTE — PROGRESS NOTES
Physical Therapy  Name: Wale Kelly MRN: [id number] :   1927   Date:  2022   Admission Date: 2022 Room:  [address] [place] [address]   Restrictions/Precautions:        Fall Risk,, general precautions    Communication with other providers:  RN states pt is ok to see for therapy    Subjective:  Patient states: \"Do you understand? I don't understand\" Patient makes this comment before STS transfer and gait training. Pain:   Location, Type, Intensity (0/10 to 10/10): Makes sounds that movement is uncomfortable but unable to verbalize location of discomfort     Objective:    Observation:  Patient is supine in bed, HOB slightly elevated. Patinet is unble to give last name or identify situation in hosipital.     Treatment, including education/measures:  *Added time for patient to be cued to stay on all tasks and activities today  Transfers with line management of None  Rolling: Min A for initiation   Supine to sit :Mod A with segmental cueing for transfer sequence  Seated balance: Min-Mod A for patient to avoid Rt lateral lean, cues do not improve and patient required BUE support on EOB. Scooting :Patient attempts seated scooting to EOB with Mod A and Max A seated retro scooting into recliner  Sit to stand :Several attempts only 2 successful. Patient is able to initiate stand with Min A but progresses to Max A d/t patient attempting to sit back down before full stand. Max cues for RW utilization and redirection onto task leads to successful sit to stand, x3 sets  Stand to sit :Mod A for hip guidance to transfer surface d/t patient unable to follow cues for safe distance to EOB and recliner  SPT:Mod A with walker guidance and sequence    Gait:  Pt amb with RW for 2 ft with Min-Mod assist, assist level increase when patient is distracted from purpose of the current activity, patient request to return to bed and decreases in BLE stability.  Patient retro steps to bed with RW and hips guided to EOB before patient impulsively sits at Max A. Seated rest break ~5-8' with cosntant cueing on goal to ambulate to chair  Patient amb with RW 6ft at [address] from EOB to recliner. Cues to stay on task and constant redirection needed d/t patient becoming overwhelmed with task. Safety  Patient left safely in the recliner, RN aware, with call light/phone in reach with alarm applied. Gait belt and mask were used for transfers and gait. Assessment / Impression:   Patient rei's fair functional mobility today. However, her cognition and ability to stay on task increased her assistance requirements for mobility today. Patient's tolerance of treatment:  Fair-   Adverse Reaction: none  Significant change in status and impact:  Decrease participation today  Barriers to improvement:  cognitive status. Plan for Next Session:    Will cont to work towards pt's goals per patient tolerance  Time in:  0855  Time out:  0920  Timed treatment minutes:  25  Total treatment time:  25  Previously filed items:     Short Term Goals  Time Frame for Short term goals: 1 week  Short term goal 1: pt to complete all bed mobility with supervision  Short term goal 2: Pt to complete all STS to/from bed, commode, and chair SBA  Short term goal 3: Pt to ambulate 150' with LRAD SBA       Electronically signed by:     Onur Agrawal PTA  5/31/2022, 9:22 AM

## 2022-05-31 NOTE — PROGRESS NOTES
Occupational Therapy  . Occupational Therapy Treatment Note    Name: Mansi Perez MRN: [id number] :   1927   Date:  2022   Admission Date: 2022 Room:  [address] [place] [address]-A     Discharge Recommendation: Skilled Nursing Facility    Primary Problem:      Restrictions/Precautions:          General precautions, fall risk        Communication with other providers: CM updated note student SARAH    Subjective:  Patient states:  Mostly non verbal.   Pain:   Location, Type, Intensity (0/10 to 10/10): None stated    Objective:    Observation: patient asleep and supine. easily aroused. Would not answer simple questions or follow simple commands, per staff only took a few bites of food. Encouraged patient to participate In therapy. Objective Measures:  Pocket tele    Treatment, including education:    ADL activity training was instructed today. Cues were given for safety, sequence, UE/LE placement, visual cues, and balance. Activities performed today included dressing, toileting, hand hygiene, and grooming. Facial hygiene- DEP  Hand hygiene- DEP  BOWSER anticipates patient performing tasks SBA/CGA, however patients fatigue and confusion is most likely hindering performance. Patient educated on role of OT , benefits of OT and rationale for therapeutic intervention. participation with therapy, benefit of OOB/EOB activity. All therapeutic intervention performed c emphasis on BUE strengthening and endurance to  increase strength for functional tasks / transfers. Patient left safely in bed at end of session, with call light in reach, alarm on and nursing aware. Gait belt was used for func transfers / mobility.       Assessment / Impression:    Patient's tolerance of treatment: poor  Adverse Reaction: fatigue  Significant change in status and impact:  none  Barriers to improvement: confusion, lethargy      Plan for Next Session:    Continue with OT POC      Time in:  1640  Time out:  1650  Timed treatment minutes:  10  Total treatment time:  10      Electronically signed by:    SARAH Boyer COTA/L [id number]    5/31/2022, 4:44 PM

## 2022-06-01 LAB
ANION GAP SERPL CALCULATED.3IONS-SCNC: 12 MMOL/L (ref 4–16)
BASOPHILS ABSOLUTE: 0.1 K/CU MM
BASOPHILS RELATIVE PERCENT: 0.9 % (ref 0–1)
BUN BLDV-MCNC: 30 MG/DL (ref 6–23)
CALCIUM SERPL-MCNC: 8.4 MG/DL (ref 8.3–10.6)
CHLORIDE BLD-SCNC: 100 MMOL/L (ref 99–110)
CO2: 22 MMOL/L (ref 21–32)
CREAT SERPL-MCNC: 2.2 MG/DL (ref 0.6–1.1)
DIFFERENTIAL TYPE: ABNORMAL
EOSINOPHILS ABSOLUTE: 0.2 K/CU MM
EOSINOPHILS RELATIVE PERCENT: 2.3 % (ref 0–3)
GFR AFRICAN AMERICAN: 25 ML/MIN/1.73M2
GFR NON-AFRICAN AMERICAN: 21 ML/MIN/1.73M2
GLUCOSE BLD-MCNC: 138 MG/DL (ref 70–99)
HCT VFR BLD CALC: 43.9 % (ref 37–47)
HEMOGLOBIN: 13.7 GM/DL (ref 12.5–16)
IMMATURE NEUTROPHIL %: 0.5 % (ref 0–0.43)
LYMPHOCYTES ABSOLUTE: 1.2 K/CU MM
LYMPHOCYTES RELATIVE PERCENT: 14.8 % (ref 24–44)
MCH RBC QN AUTO: 29 PG (ref 27–31)
MCHC RBC AUTO-ENTMCNC: 31.2 % (ref 32–36)
MCV RBC AUTO: 93 FL (ref 78–100)
MONOCYTES ABSOLUTE: 0.8 K/CU MM
MONOCYTES RELATIVE PERCENT: 9.6 % (ref 0–4)
NUCLEATED RBC %: 0 %
PDW BLD-RTO: 14.4 % (ref 11.7–14.9)
PLATELET # BLD: 260 K/CU MM (ref 140–440)
PMV BLD AUTO: 10 FL (ref 7.5–11.1)
POTASSIUM SERPL-SCNC: 4.8 MMOL/L (ref 3.5–5.1)
RBC # BLD: 4.72 M/CU MM (ref 4.2–5.4)
SEGMENTED NEUTROPHILS ABSOLUTE COUNT: 5.7 K/CU MM
SEGMENTED NEUTROPHILS RELATIVE PERCENT: 71.9 % (ref 36–66)
SODIUM BLD-SCNC: 134 MMOL/L (ref 135–145)
TOTAL IMMATURE NEUTOROPHIL: 0.04 K/CU MM
TOTAL NUCLEATED RBC: 0 K/CU MM
WBC # BLD: 7.9 K/CU MM (ref 4–10.5)

## 2022-06-01 PROCEDURE — 97530 THERAPEUTIC ACTIVITIES: CPT

## 2022-06-01 PROCEDURE — 97110 THERAPEUTIC EXERCISES: CPT

## 2022-06-01 PROCEDURE — 6360000002 HC RX W HCPCS: Performed by: INTERNAL MEDICINE

## 2022-06-01 PROCEDURE — 97535 SELF CARE MNGMENT TRAINING: CPT

## 2022-06-01 PROCEDURE — 6370000000 HC RX 637 (ALT 250 FOR IP): Performed by: NURSE PRACTITIONER

## 2022-06-01 PROCEDURE — 2500000003 HC RX 250 WO HCPCS: Performed by: EMERGENCY MEDICINE

## 2022-06-01 PROCEDURE — 2580000003 HC RX 258: Performed by: INTERNAL MEDICINE

## 2022-06-01 PROCEDURE — 6370000000 HC RX 637 (ALT 250 FOR IP): Performed by: INTERNAL MEDICINE

## 2022-06-01 PROCEDURE — 94761 N-INVAS EAR/PLS OXIMETRY MLT: CPT

## 2022-06-01 PROCEDURE — 99231 SBSQ HOSP IP/OBS SF/LOW 25: CPT | Performed by: NURSE PRACTITIONER

## 2022-06-01 PROCEDURE — 1200000000 HC SEMI PRIVATE

## 2022-06-01 PROCEDURE — 80048 BASIC METABOLIC PNL TOTAL CA: CPT

## 2022-06-01 PROCEDURE — 85025 COMPLETE CBC W/AUTO DIFF WBC: CPT

## 2022-06-01 PROCEDURE — 36415 COLL VENOUS BLD VENIPUNCTURE: CPT

## 2022-06-01 PROCEDURE — 2580000003 HC RX 258: Performed by: NURSE PRACTITIONER

## 2022-06-01 RX ORDER — HEPARIN SODIUM 5000 [USP'U]/ML
5000 INJECTION, SOLUTION INTRAVENOUS; SUBCUTANEOUS EVERY 8 HOURS SCHEDULED
Status: DISCONTINUED | OUTPATIENT
Start: 2022-06-02 | End: 2022-06-03 | Stop reason: HOSPADM

## 2022-06-01 RX ORDER — SODIUM CHLORIDE 9 MG/ML
INJECTION, SOLUTION INTRAVENOUS CONTINUOUS
Status: DISCONTINUED | OUTPATIENT
Start: 2022-06-01 | End: 2022-06-02

## 2022-06-01 RX ORDER — HEPARIN SODIUM 5000 [USP'U]/ML
5000 INJECTION, SOLUTION INTRAVENOUS; SUBCUTANEOUS EVERY 8 HOURS SCHEDULED
Status: DISCONTINUED | OUTPATIENT
Start: 2022-06-02 | End: 2022-06-01

## 2022-06-01 RX ORDER — SULFAMETHOXAZOLE AND TRIMETHOPRIM 800; 160 MG/1; MG/1
1 TABLET ORAL DAILY
Status: DISCONTINUED | OUTPATIENT
Start: 2022-06-02 | End: 2022-06-01

## 2022-06-01 RX ORDER — HEPARIN SODIUM 5000 [USP'U]/ML
5000 INJECTION, SOLUTION INTRAVENOUS; SUBCUTANEOUS EVERY 8 HOURS SCHEDULED
Status: DISCONTINUED | OUTPATIENT
Start: 2022-06-01 | End: 2022-06-01

## 2022-06-01 RX ADMIN — CLOTRIMAZOLE AND BETAMETHASONE DIPROPIONATE: 10; .5 CREAM TOPICAL at 20:51

## 2022-06-01 RX ADMIN — RIVAROXABAN 20 MG: 20 TABLET, FILM COATED ORAL at 16:52

## 2022-06-01 RX ADMIN — CEFEPIME HYDROCHLORIDE 1000 MG: 1 INJECTION, POWDER, FOR SOLUTION INTRAMUSCULAR; INTRAVENOUS at 23:41

## 2022-06-01 RX ADMIN — MICONAZOLE NITRATE: 2 POWDER TOPICAL at 08:45

## 2022-06-01 RX ADMIN — CLOTRIMAZOLE AND BETAMETHASONE DIPROPIONATE: 10; .5 CREAM TOPICAL at 08:46

## 2022-06-01 RX ADMIN — SODIUM CHLORIDE: 9 INJECTION, SOLUTION INTRAVENOUS at 23:46

## 2022-06-01 RX ADMIN — HYDRALAZINE HYDROCHLORIDE 25 MG: 25 TABLET, FILM COATED ORAL at 20:41

## 2022-06-01 RX ADMIN — MICONAZOLE NITRATE: 2 POWDER TOPICAL at 20:51

## 2022-06-01 RX ADMIN — SODIUM CHLORIDE, PRESERVATIVE FREE 10 ML: 5 INJECTION INTRAVENOUS at 20:50

## 2022-06-01 RX ADMIN — SULFAMETHOXAZOLE AND TRIMETHOPRIM 1 TABLET: 800; 160 TABLET ORAL at 08:45

## 2022-06-01 RX ADMIN — PANTOPRAZOLE SODIUM 40 MG: 40 TABLET, DELAYED RELEASE ORAL at 06:43

## 2022-06-01 RX ADMIN — CHLORHEXIDINE GLUCONATE: 4 LIQUID TOPICAL at 08:46

## 2022-06-01 RX ADMIN — HYDRALAZINE HYDROCHLORIDE 25 MG: 25 TABLET, FILM COATED ORAL at 08:45

## 2022-06-01 RX ADMIN — DONEPEZIL HYDROCHLORIDE 5 MG: 5 TABLET, FILM COATED ORAL at 20:41

## 2022-06-01 RX ADMIN — LISINOPRIL 40 MG: 20 TABLET ORAL at 08:45

## 2022-06-01 RX ADMIN — SODIUM CHLORIDE, PRESERVATIVE FREE 10 ML: 5 INJECTION INTRAVENOUS at 08:47

## 2022-06-01 ASSESSMENT — PAIN SCALES - WONG BAKER
WONGBAKER_NUMERICALRESPONSE: 0
WONGBAKER_NUMERICALRESPONSE: 0

## 2022-06-01 ASSESSMENT — PAIN SCALES - GENERAL: PAINLEVEL_OUTOF10: 0

## 2022-06-01 NOTE — PROGRESS NOTES
RENAL DOSE ADJUSTMENT MADE PER P/T PROTOCOL    PREVIOUS ORDER:  Bactrim DS 1 tablet every 12 hours    Estimated Creatinine Clearance: 13 mL/min (A) (based on SCr of 2.2 mg/dL (H)). Recent Labs     05/30/22  0132 05/30/22  0132 06/01/22  0850   BUN 17  --  30*   CREATININE 1.1   < > 2.2*      < > 260    < > = values in this interval not displayed.      NEW RENALLY ADJUSTED ORDER:  Bactrim DS 1 tablet daily  end date from negative BC is 6/8/22    Noni Arenas, [address] [facility]  6/1/2022 4:27 PM

## 2022-06-01 NOTE — PROGRESS NOTES
Physical Therapy  Name: Juany Gonzalez MRN: [id number] :   1927   Date:  2022   Admission Date: 2022 Room:  [address] [place] [address]   Restrictions/Precautions:        fall risk, general precautions   Communication with other providers:  Mustapha Carrizales RN states pt is ok to see for therapy  Subjective:  Patient states:  '' I have cats ''   Pain:   Location, Type, Intensity (0/10 to 10/10): Unable to report   Objective:    Observation:  Semi fowlers in bed with STNA present   Treatment, including education/measures:  pt presented in room and was in semi fowlers in bed. Agreeable to therapy with encouragement. Review of POC. Education of goals and importance of participation to prevent decline. Education in reaching back and pushing off from bed during transfers. Pt oriented to self. Able to follow one step cues with frequent repetition. Pt occ moaned with movement but was not able to articulate a specific area of pain. Pt declined to sit in recliner. Nurse reports pt has been in a ' bad mood' all day. Supine Exercises: Ankle pumps x 10  Hip IR/ER x 10  SAQ's x 10 (AAROM)  Required frequent VC and TC to continue    Therapeutic Exercise:  Therapeutic exercises were instructed today. Cues were given for technique, safety, recruitment, and rationale. Cues were verbal and/or tactile. Transfers with line management of none  Rolling: Edu  Supine to sit : ModA with VC one step cues  Sit to supine : ModA with  One step VC  Scooting : ModA with VC  Sit to stand : ModA. Pt completed 1 and declined to participate in further attempts  Stand to sit : [address] [facility] [place]. Pt 'fell' back onto bed. Did not follow cues to reach back. Sitting Exercises: Ankle pumps x 10  LAQ's x 8  Marching x 10   Hip Abd x 5  Therapeutic Exercise:  Therapeutic exercises were instructed today. Cues were given for technique, safety, recruitment, and rationale. Cues were verbal and/or tactile.   Safety  Patient left safely in the bed, with call light/phone in reach with alarm applied. Gait belt used for transfers.   Assessment / Impression:     Patient's tolerance of treatment:  fair   Adverse Reaction: none  Significant change in status and impact:  Not since last session, decrease in participation and increase in assist level since eval  Barriers to improvement:  Self limiting behavior, cognition,   Plan for Next Session:    Will cont to work towards pt's goals per patients tolerance  Time in:  1:11  Time out:  1:35  Timed treatment minutes:  24  Total treatment time:  24  Previously filed items:     Short Term Goals  Time Frame for Short term goals: 1 week  Short term goal 1: pt to complete all bed mobility with supervision  Short term goal 2: Pt to complete all STS to/from bed, commode, and chair SBA  Short term goal 3: Pt to ambulate 150' with LRAD SBA     Electronically signed by:    Fadia Vidal PTA PTA  6/1/2022, 1:38 PM

## 2022-06-01 NOTE — PROGRESS NOTES
Hospital Medicine Progress Note     Date:  5/31/2022    PCP: Antony Benitez MD (Tel: [phone])    Date of Admission: 5/23/2022    Chief complaint:   Chief Complaint   Patient presents with    Fall       Brief admission history: Pleasant 51-year-old female with history of essential hypertension GERD, dementia (on Aricept), history of left lower extremity DVT (on Xarelto), AAA, lung mass, history of recurrent falls, who presents to the emergency room following fall at [facility] facility. On presentation to the emergency room, she has slightly elevated creatinine kinase (of 499, not otherwise qualified as rhabdomyolysis), mild leukocytosis, elevated lactic acid level. EKG was concerning for atrial fibrillation. During hospital stay, neurology was consulted for evaluation of possible toxic-metabolic encephalopathy; however, following unremarkable MRI-brain, it was later determined confusion could be secondary to urinary tract infection. Abnormal EKG was also evaluated by cardiology, determined to be sinus rhythm with bifascicular block and PACs. She has nonzero troponin, which was determined to be secondary to elevated creatinine kinase, possible demand ischemia (type II NSTEMI); and no further work-up per cardiology. Assessment/plan:  1. Fall at assisted living facility. Suspect possible mechanical.  Maintain on fall precautions. Therapy has recommended SNF at discharge. SNF denial being appealed. 2. Polymicrobial bacteremia. Blood cultures with coagulase-negative Staph, E. coli, Klebsiella pneumonia. Suspected to be GI versus  translocation. Completed course of vancomycin and cefepime on 5/27/2022. ID has recommended oral Bactrim started on 5/27/2022, to complete 14-day course on June 9, 2022, monitor potassium closely  1. Consider cephalosporin as an alternative. ID not available until June 6.  3. Sepsis secondary to urinary tract infection, E. coli UTI.   Met sepsis criteria on admission based on tachycardia, tachypnea, lactic acidosis. Lactic acidosis has resolved following hydration. 4. Patient has nonspecific creatinine kinase, does not otherwise qualify as rhabdomyolysis. Initial creatinine kinase was 499, repeat was 151.  5. Constipation. Abdominal exam is benign. Bowel regimen in place - d/w nursing staff. 6. Other comorbidities: history of essential hypertension GERD, dementia (on aricept), history of left lower extremity DVT (on Xarelto), AAA, lung mass, history of recurrent falls. Diet: ADULT DIET; Regular; Low Fat/Low Chol/High Fiber/2 gm Na    Code status: Full Code   ----------  Subjective  Was sitting up in the chair when I saw her  She stated she did not feel good when I saw her    Objective  Physical exam:  Vitals: BP (!) 94/57   Pulse 90   Temp 97.9 °F (36.6 °C) (Oral)   Resp 14   Ht 5' (1.524 m)   Wt 138 lb (62.6 kg)   SpO2 92%   BMI 26.95 kg/m²      Physical Exam  Constitutional:       Appearance: She is not ill-appearing. HENT:      Nose: No rhinorrhea. Eyes:      General:         Right eye: No discharge. Left eye: No discharge. Pulmonary:      Effort: Pulmonary effort is normal.   Skin:     Coloration: Skin is not jaundiced. Neurological:      Cranial Nerves: No cranial nerve deficit. 24HR INTAKE/OUTPUT:      Intake/Output Summary (Last 24 hours) at 5/31/2022 2320  Last data filed at 5/31/2022 1641  Gross per 24 hour   Intake 240 ml   Output --   Net 240 ml     I/O last 3 completed shifts: In: 340 [P.O.:340]  Out: -   No intake/output data recorded.   Meds:    hydrALAZINE  25 mg Oral 2 times per day    sulfamethoxazole-trimethoprim  1 tablet Oral 2 times per day    chlorhexidine gluconate   Topical Daily    rivaroxaban  20 mg Oral Daily    clotrimazole-betamethasone   Topical BID    miconazole   Topical BID    sodium chloride flush  5-40 mL IntraVENous 2 times per day    donepezil  5 mg Oral QPM    lisinopril  40 mg Oral Daily    pantoprazole  40 mg Oral QAM AC     Infusions:    sodium chloride 25 mL/hr at 05/27/22 0231     PRN Meds: bisacodyl, labetalol, sodium chloride flush, sodium chloride, ondansetron **OR** ondansetron, polyethylene glycol, acetaminophen **OR** acetaminophen    Labs/imaging:  CBC:   Recent Labs     05/30/22 0132   WBC 7.0   HGB 14.5        BMP:    Recent Labs     05/30/22 0132      K 4.5      CO2 22   BUN 17   CREATININE 1.1   GLUCOSE 99     Hepatic:   Recent Labs     05/30/22 0132   AST 14*   ALT 11   BILITOT 0.4   ALKPHOS 79       MARSHALL Neal MD  -------------------------------  Rounding hospitalist

## 2022-06-01 NOTE — PROGRESS NOTES
Occupational Therapy  . Occupational Therapy Treatment Note    Name: Oly Franco MRN: [id number] :   1927   Date:  2022   Admission Date: 2022 Room:  [address] [place] [address]-A     Discharge Recommendation: Skilled Nursing Facility    Primary Problem:      Restrictions/Precautions:          General precautions, fall risk        Communication with other providers: aid present at end of session    Subjective:  Patient states:  Mostly non verbal  Pain:   Location, Type, Intensity (0/10 to 10/10): None stated    Objective:    Observation: patient non verbal throughout. Noted patient had not eaten her dinner plate yet, when asked if she was hungry she said no. When aid entered she offered a bite of food to the patient and the patient was willing to eat. When prompting the patient to do a simple task often patient does not respond or initiate with student SARAH. Objective Measures:  tele    Treatment, including education:  ADL activity training was instructed today. Cues were given for safety, sequence, UE/LE placement, visual cues, and balance. Activities performed today included  Grooming. Facial hygiene: DEP in high fowlers, patient did not follow simple commands. Prompted patient to grab washcloth patient would not despite cues. Feeding -DEP encouraged patient to eat     Throughout session student SARAH encouraged patient to participate in therapy and cue her to initiate tasks. Patient pulled covers up to shoulders numerous times when asked to perform task. Patient does not answer or follow simple questions and commands despite frequent cuing. Patient educated on role of OT , benefits of OT and rationale for therapeutic intervention. Benefit of OOB/EOB activities, benefit of participation with therapy, benefit of movement. All therapeutic intervention performed c emphasis on BUE strengthening and endurance to  increase strength for functional tasks / transfers.     Patient left safely in bed at end of session, with call light in reach, alarm on and nursing aware. Assessment / Impression:    Patient did not follow simple commands, only answered 10% of yes no questions, with very little eye contact throughout. Per chart review patient apparently has sundowners and preforms better earlier in the day.     Patient's tolerance of treatment: poor  Adverse Reaction: none  Significant change in status and impact:  none  Barriers to improvement: cognition      Plan for Next Session:    Continue with OT POC      Time in:  1706  Time out:  1719  Timed treatment minutes:  13  Total treatment time:  13      Electronically signed by:    Jocelyne Lion, Student SARAH  I have read and agree with the above documentation - NIELS Gunn    6/1/2022, 5:24 PM

## 2022-06-01 NOTE — CARE COORDINATION
LSW received a call from [employer] with [facility] this LSW that they have started the appeal process for pt to go to the skilled unit at [facility]. Dee requested pt medical information. H&P, meds. All of the PT/OT notes and doctors progress notes. LSW informed Dee that his LSW will fax her the past 3 days of notes plus H&P and all of the therapy notes. Dee agreed. LSW faxed this info to the fax number she gave [phone]. Dee requested to talk with pt and LSW informed her that pt is confused at this time. Dee requested LSW call pt Ang. LSW called Donavon bradley's HCDM and informed him of that the appeal has been started.

## 2022-06-01 NOTE — PROGRESS NOTES
Lab called hgb was 5.5. Alberto Brandt ordered a stat redraw. Pt had same thing happen yesterday and redraw was fine. Will redraw.  Dr. Jeff Rodriguez notified

## 2022-06-01 NOTE — PROGRESS NOTES
Infectious Disease Progress Note  2022   Patient Name: Johnathan Tate : 1927   Impression  · Polymicrobial Bacteremia Secondary to Complicated UTI: K.pneumoniae may be from GI or :    · MssA Screen Positive:     ? Rhabdomyolysis:     ? Left Heel Pressure Sore: Reports new from fall     ? Chest Rash:     § Afebrile, no leukocytosis. Mssa screen positive. Patient has clinically improved, continue po Bactrim x 14 days past neg BC (will cover UTI and positive MssA screen)   § -UA , RBC 42, bladder scan reveals urinary retention  § -Urine culture: E.coli >100,000  § -Rapid COVID-19 Negative  § -BC  Klebsiella pneumoniae, Escherichia coli, Staphylococcus Spp  § -Repeat BC 0/ NGTD  § -CXR: no acute traumatic findings. 2.2 cm RML nodular focus with attention on CT chest rec for eval to exclude underlying malignancy     · Acute Encephalopathy:  § Dr. Irvin Taylor, neurology, onboard  § rec EEG, imp of toxic encephalopathy  to sepsis (possible UTI), imp of probable improvement with resolution of sepsis     ? 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 AAA:  ? HTN:  ? 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 Sujit Brady onboard     ? Dementia     · Multi-morbidity: per PMHx: HTN, AAA, dementia, colonoscopy , HLD, lung mass, DVTs on [facility]     Plan:  · Continue po Bactrim 1 DS bid (CrCl is 35, no adjustment necessary) x 14 total days of duration of ABX (end date from negative BC is 22)  · Need to watch lytes closely, Bactrim may cause hyperkalemia. · De-colonize MssA, [place] daily baths x 10 days (end date 22)  Weekly labs drawn on Monday during the course of treatment  CBC with differential, CMP, ESR, CRP  Fax results to Attn: [facility] Infectious Diseases Staff  ? # [phone] OK from ID standpoint to DC when ready  ? Follow up with PCP    Ongoing Antimicrobial Therapy  Bactrim -? Completed Antimicrobial Therapy  Cefepime   Vancomycin   ? History:? Interval history noted. Chief complaint: Polymicrobial bacteremia: CoNS, E coli, and K penumoniae. GI vs  source. Denies n/v/d/f or untoward effects of antibiotics. Physical Exam:  Vital Signs: BP (!) 121/58   Pulse 80   Temp 98.1 °F (36.7 °C) (Oral)   Resp 10   Ht 5' (1.524 m)   Wt 138 lb (62.6 kg)   SpO2 94%   BMI 26.95 kg/m²     Gen: No distress, resting quietly in bed. A&O x 4. Wounds: C/D/I left heel pressure sore, and erythema, no drainage, on mid chest to under bilateral breasts. .  Chest: no distress and CTA. Good air movement. Room air. Heart: NSR and no MRG. Abd: soft, non-distended, no tenderness, no hepatomegaly. Normoactive bowel sounds. Ext: no clubbing, cyanosis, or edema  Neuro: Mental status intact. CN 2-12 intact and no focal sensory or motor deficits     Radiologic / Imaging / TESTING  5/23/22 CT Head WO Contrast:  Impression   No acute intracranial abnormality.      5/23/22 CT Cervical Spine WO Contrast:  Impression   No acute abnormality of the cervical spine.      5/23/22 CT Thoracic Spine WO Contrast:  Impression   No acute thoracic spine trauma.       8 mm average diameter right upper lobe subpleural pulmonary nodule. Follow-up as clinically warranted according to the Fleischner criteria below.       RECOMMENDATIONS:   8 mm right solid pulmonary nodule within the upper lobe. Recommend a   non-contrast Chest CT at 6-12 months. If patient is high risk for malignancy,   recommend an additional non-contrast Chest CT at 18-24 months; if patient is   low risk for malignancy a non-contrast Chest CT at 18-24 months is optional.   These guidelines do not apply to immunocompromised patients and patients with   cancer. Follow up in patients with significant comorbidities as clinically   warranted. For lung cancer screening, adhere to Lung-RADS guidelines. Reference: Radiology.  2017; 284(1):228-43.      5/23/22 CT Lumbar Spine WO Contrast:  Impression   No acute fracture. Steven Garber degenerative changes.  MRI suggested.      5/23/22 XR Chest Portable:  Impression   No acute traumatic findings of the chest       2.2 cm right mid lung rounded nodular focus with attention on CT chest   recommended for further evaluation to exclude underlying malignancy      5/23/22 XR Pelvis:  Impression   No acute traumatic findings of the pelvis identified      5/23/22 VL Dup Carotid Bilateral:  Impression   The right internal carotid artery demonstrates 0-50% stenosis.       The left internal carotid artery demonstrates 0-50% stenosis.       Bilateral vertebral arteries are patent with flow in the normal direction.      5/23/22 MRI Brain WO Contrast:  Impression   No acute intracranial abnormality.       Minor sinus mucosal disease and minor left mastoid mucoperiosteal thickening   of doubtful clinical significance. Labs:    No results found for this or any previous visit (from the past 24 hour(s)). CULTURE results: Invalid input(s): BLOOD CULTURE,  URINE CULTURE, SURGICAL CULTURE    Diagnosis:  Patient Active Problem List   Diagnosis    Rectal bleeding    Fall at home, initial encounter    Fall    Toxic metabolic encephalopathy    Seizure-like activity ([address]    Other seizures ([address]    E coli bacteremia    Bacteremia due to Klebsiella pneumoniae       Active Problems  Principal Problem:    Fall at home, initial encounter  Active Problems:    Fall    Toxic metabolic encephalopathy    Seizure-like activity ([address]    Other seizures ([address]    E coli bacteremia    Bacteremia due to Klebsiella pneumoniae  Resolved Problems:    * No resolved hospital problems. *    Electronically signed by: Electronically signed by KIKI Cordova CNP on 6/1/2022 at 9:26 AM

## 2022-06-01 NOTE — PROGRESS NOTES
Nutrition Assessment     Type and Reason for Visit: Reassess    Nutrition Recommendations/Plan:   1. Continue current diet, please set up pt and assist with meals prn   2. Ordered ensure to optimize intake once daily. Nutrition Assessment:  Pts PO intake has significantly declined since last assessment will start ensure once daily. Continue to monitor, follow at moderate nutrition risk. Estimated Daily Nutrient Needs:  Energy (kcal):  4198-9839 (25-30 kcals/kg) Weight Used for Energy Requirements: Current     Protein (g):  45-55 (1-1.2 g/kg) Weight Used for Protein Requirements: Ideal        Fluid (ml/day):  1500 Method Used for Fluid Requirements: 1 ml/kcal    Nutrition Related Findings:   Na 134, Glu 138 Wound Type: None    Current Nutrition Therapies:    ADULT DIET; Regular; Low Fat/Low Chol/High Fiber/2 gm Na    Anthropometric Measures:  · Height: 5' (152.4 cm)  · Current Body Wt: 124 lb 12.5 oz (56.6 kg)   · BMI: 24.4    Nutrition Diagnosis:   · Predicted inadequate energy intake related to cognitive or neurological impairment (advanced age) as evidenced by intake 26-50%      Nutrition Interventions:   Food and/or Nutrient Delivery: Continue Current Diet  Nutrition Education/Counseling: No recommendation at this time  Coordination of Nutrition Care: Continue to monitor while inpatient       Goals:     Goals: Meet at least 75% of estimated needs,by next RD assessment       Nutrition Monitoring and Evaluation:   Behavioral-Environmental Outcomes: None Identified  Food/Nutrient Intake Outcomes: None Identified  Physical Signs/Symptoms Outcomes: Biochemical Data,Meal Time Behavior,Weight    Discharge Planning:     Too soon to determine     Ryan Aguayo RD, LD  Contact: [phone]

## 2022-06-02 ENCOUNTER — APPOINTMENT (OUTPATIENT)
Dept: CT IMAGING | Age: 87
DRG: 871 | End: 2022-06-02
Payer: MEDICARE

## 2022-06-02 ENCOUNTER — APPOINTMENT (OUTPATIENT)
Dept: ULTRASOUND IMAGING | Age: 87
DRG: 871 | End: 2022-06-02
Payer: MEDICARE

## 2022-06-02 LAB
ANION GAP SERPL CALCULATED.3IONS-SCNC: 9 MMOL/L (ref 4–16)
BASOPHILS ABSOLUTE: 0.1 K/CU MM
BASOPHILS RELATIVE PERCENT: 1 % (ref 0–1)
BUN BLDV-MCNC: 28 MG/DL (ref 6–23)
CALCIUM SERPL-MCNC: 8.3 MG/DL (ref 8.3–10.6)
CHLORIDE BLD-SCNC: 106 MMOL/L (ref 99–110)
CO2: 23 MMOL/L (ref 21–32)
CREAT SERPL-MCNC: 1.9 MG/DL (ref 0.6–1.1)
DIFFERENTIAL TYPE: ABNORMAL
EOSINOPHILS ABSOLUTE: 0.2 K/CU MM
EOSINOPHILS RELATIVE PERCENT: 2.7 % (ref 0–3)
GFR AFRICAN AMERICAN: 30 ML/MIN/1.73M2
GFR NON-AFRICAN AMERICAN: 25 ML/MIN/1.73M2
GLUCOSE BLD-MCNC: 97 MG/DL (ref 70–99)
HCT VFR BLD CALC: 44.2 % (ref 37–47)
HEMOGLOBIN: 14 GM/DL (ref 12.5–16)
IMMATURE NEUTROPHIL %: 0.5 % (ref 0–0.43)
LYMPHOCYTES ABSOLUTE: 1 K/CU MM
LYMPHOCYTES RELATIVE PERCENT: 13.1 % (ref 24–44)
MCH RBC QN AUTO: 28.5 PG (ref 27–31)
MCHC RBC AUTO-ENTMCNC: 31.7 % (ref 32–36)
MCV RBC AUTO: 90 FL (ref 78–100)
MONOCYTES ABSOLUTE: 0.8 K/CU MM
MONOCYTES RELATIVE PERCENT: 10.1 % (ref 0–4)
NUCLEATED RBC %: 0 %
PDW BLD-RTO: 14.5 % (ref 11.7–14.9)
PLATELET # BLD: 232 K/CU MM (ref 140–440)
PMV BLD AUTO: 10.4 FL (ref 7.5–11.1)
POTASSIUM SERPL-SCNC: 4.9 MMOL/L (ref 3.5–5.1)
RBC # BLD: 4.91 M/CU MM (ref 4.2–5.4)
SEGMENTED NEUTROPHILS ABSOLUTE COUNT: 5.7 K/CU MM
SEGMENTED NEUTROPHILS RELATIVE PERCENT: 72.6 % (ref 36–66)
SODIUM BLD-SCNC: 138 MMOL/L (ref 135–145)
TOTAL CK: 150 IU/L (ref 26–140)
TOTAL IMMATURE NEUTOROPHIL: 0.04 K/CU MM
TOTAL NUCLEATED RBC: 0 K/CU MM
WBC # BLD: 7.8 K/CU MM (ref 4–10.5)

## 2022-06-02 PROCEDURE — 6370000000 HC RX 637 (ALT 250 FOR IP): Performed by: NURSE PRACTITIONER

## 2022-06-02 PROCEDURE — 2500000003 HC RX 250 WO HCPCS: Performed by: EMERGENCY MEDICINE

## 2022-06-02 PROCEDURE — 2580000003 HC RX 258: Performed by: INTERNAL MEDICINE

## 2022-06-02 PROCEDURE — 99232 SBSQ HOSP IP/OBS MODERATE 35: CPT | Performed by: NURSE PRACTITIONER

## 2022-06-02 PROCEDURE — 36415 COLL VENOUS BLD VENIPUNCTURE: CPT

## 2022-06-02 PROCEDURE — 80048 BASIC METABOLIC PNL TOTAL CA: CPT

## 2022-06-02 PROCEDURE — 6370000000 HC RX 637 (ALT 250 FOR IP): Performed by: INTERNAL MEDICINE

## 2022-06-02 PROCEDURE — 2580000003 HC RX 258: Performed by: NURSE PRACTITIONER

## 2022-06-02 PROCEDURE — 6360000002 HC RX W HCPCS: Performed by: INTERNAL MEDICINE

## 2022-06-02 PROCEDURE — 82570 ASSAY OF URINE CREATININE: CPT

## 2022-06-02 PROCEDURE — 82550 ASSAY OF CK (CPK): CPT

## 2022-06-02 PROCEDURE — 71250 CT THORAX DX C-: CPT

## 2022-06-02 PROCEDURE — 93971 EXTREMITY STUDY: CPT

## 2022-06-02 PROCEDURE — 85025 COMPLETE CBC W/AUTO DIFF WBC: CPT

## 2022-06-02 PROCEDURE — 1200000000 HC SEMI PRIVATE

## 2022-06-02 PROCEDURE — 74176 CT ABD & PELVIS W/O CONTRAST: CPT

## 2022-06-02 PROCEDURE — 94761 N-INVAS EAR/PLS OXIMETRY MLT: CPT

## 2022-06-02 RX ORDER — HYDRALAZINE HYDROCHLORIDE 25 MG/1
25 TABLET, FILM COATED ORAL EVERY 12 HOURS SCHEDULED
Status: DISCONTINUED | OUTPATIENT
Start: 2022-06-02 | End: 2022-06-03 | Stop reason: HOSPADM

## 2022-06-02 RX ORDER — SODIUM CHLORIDE 9 MG/ML
INJECTION, SOLUTION INTRAVENOUS CONTINUOUS
Status: DISCONTINUED | OUTPATIENT
Start: 2022-06-02 | End: 2022-06-03

## 2022-06-02 RX ADMIN — BISACODYL 10 MG: 10 SUPPOSITORY RECTAL at 11:22

## 2022-06-02 RX ADMIN — HYDRALAZINE HYDROCHLORIDE 25 MG: 25 TABLET, FILM COATED ORAL at 20:38

## 2022-06-02 RX ADMIN — CEFEPIME HYDROCHLORIDE 1000 MG: 1 INJECTION, POWDER, FOR SOLUTION INTRAMUSCULAR; INTRAVENOUS at 11:24

## 2022-06-02 RX ADMIN — SODIUM CHLORIDE, PRESERVATIVE FREE 10 ML: 5 INJECTION INTRAVENOUS at 11:22

## 2022-06-02 RX ADMIN — CHLORHEXIDINE GLUCONATE 120 ML: 4 LIQUID TOPICAL at 11:23

## 2022-06-02 RX ADMIN — PANTOPRAZOLE SODIUM 40 MG: 40 TABLET, DELAYED RELEASE ORAL at 06:15

## 2022-06-02 RX ADMIN — MICONAZOLE NITRATE: 2 POWDER TOPICAL at 11:23

## 2022-06-02 RX ADMIN — SODIUM CHLORIDE: 9 INJECTION, SOLUTION INTRAVENOUS at 12:12

## 2022-06-02 RX ADMIN — HYDRALAZINE HYDROCHLORIDE 25 MG: 25 TABLET, FILM COATED ORAL at 11:22

## 2022-06-02 RX ADMIN — MICONAZOLE NITRATE: 2 POWDER TOPICAL at 20:54

## 2022-06-02 RX ADMIN — CLOTRIMAZOLE AND BETAMETHASONE DIPROPIONATE: 10; .5 CREAM TOPICAL at 11:23

## 2022-06-02 RX ADMIN — DONEPEZIL HYDROCHLORIDE 5 MG: 5 TABLET, FILM COATED ORAL at 20:38

## 2022-06-02 RX ADMIN — CLOTRIMAZOLE AND BETAMETHASONE DIPROPIONATE: 10; .5 CREAM TOPICAL at 20:54

## 2022-06-02 RX ADMIN — HEPARIN SODIUM 5000 UNITS: 5000 INJECTION, SOLUTION INTRAVENOUS; SUBCUTANEOUS at 20:38

## 2022-06-02 ASSESSMENT — PAIN SCALES - WONG BAKER
WONGBAKER_NUMERICALRESPONSE: 0

## 2022-06-02 ASSESSMENT — PAIN SCALES - GENERAL
PAINLEVEL_OUTOF10: 0
PAINLEVEL_OUTOF10: 0

## 2022-06-02 NOTE — PROGRESS NOTES
Infectious Disease Progress Note  2022   Patient Name: Jareth Rai : 1927   Impression  · Polymicrobial Bacteremia Secondary to Complicated UTI: K.pneumoniae may be from GI or :    · MssA Screen Positive:     ? Rhabdomyolysis:     ? Left Heel Pressure Sore: Reports new from fall     ? Chest Rash:     § Afebrile, no leukocytosis. Mssa screen positive. Patient has clinically improved, continue po Bactrim x 14 days past neg BC (will cover UTI and positive MssA screen)   § -UA , RBC 42, bladder scan reveals urinary retention  § -Urine culture: E.coli >100,000  § -Rapid COVID-19 Negative  § -BC  Klebsiella pneumoniae, Escherichia coli, Staphylococcus Spp  § -Repeat BC 0 NGTD  § -CXR: no acute traumatic findings. 2.2 cm RML nodular focus with attention on CT chest rec for eval to exclude underlying malignancy     · Acute Encephalopathy:  § Dr. Marybeth Smith, neurology, onboard  § rec EEG, imp of toxic encephalopathy  to sepsis (possible UTI), imp of probable improvement with resolution of sepsis     ? 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 AAA:  ? HTN:  ? 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 Moon Conley onboard     ? Dementia     · Multi-morbidity: per PMHx: HTN, AAA, dementia, colonoscopy , HLD, lung mass, DVTs on [facility]     Plan:  · Agree with transition to IV cefepime 1 gm q12h (renally dosed) with end date 22, as patient has been reported to be pocketing her po meds, Bactrim was [facility]  due to this. May change back to po Bactrim 1 DS bid to finish course if needed when able. · I DW Dr. Cheri Brown, hospitalist service, the plan of care   · De-colonize MssA, Hib[place] daily baths x 10 days (end date 22)  Weekly labs drawn on Monday during the course of treatment  CBC with differential, CMP, ESR, CRP  Fax results to Attn: Paulie Harding Infectious Diseases Staff  ? # [phone] from ID standpoint to DC when ready  ?  Follow up with PCP    Ongoing Antimicrobial Therapy  Cefepime 5/24-27, 6/1-? Completed Antimicrobial Therapy  Vancomycin 5/24-27  Bactrim 5/27-6/1  ? History:? Interval history noted. Chief complaint: Polymicrobial bacteremia: CoNS, E coli, and K penumoniae. GI vs  source. Denies n/v/d/f or untoward effects of antibiotics. Pleasantly confused. Physical Exam:  Vital Signs: /66   Pulse 73   Temp 97.7 °F (36.5 °C) (Oral)   Resp 14   Ht 5' (1.524 m)   Wt 116 lb 10 oz (52.9 kg)   SpO2 94%   BMI 22.78 kg/m²     Gen: alert, appears pleasantly confused, verbally responds  Wounds: C/D/I left heel pressure sore, and erythema, no drainage, on mid chest to under bilateral breasts. .  Chest: no distress and CTA. Good air movement. Room air. Heart: NSR and no MRG. Abd: soft, non-distended, no tenderness, no hepatomegaly. Normoactive bowel sounds. Ext: no clubbing, cyanosis, or edema  Neuro: Mental status intact. CN 2-12 intact and no focal sensory or motor deficits     Radiologic / Imaging / TESTING  5/23/22 CT Head WO Contrast:  Impression   No acute intracranial abnormality.      5/23/22 CT Cervical Spine WO Contrast:  Impression   No acute abnormality of the cervical spine.      5/23/22 CT Thoracic Spine WO Contrast:  Impression   No acute thoracic spine trauma.       8 mm average diameter right upper lobe subpleural pulmonary nodule. Follow-up as clinically warranted according to the Fleischner criteria below.       RECOMMENDATIONS:   8 mm right solid pulmonary nodule within the upper lobe. Recommend a   non-contrast Chest CT at 6-12 months. If patient is high risk for malignancy,   recommend an additional non-contrast Chest CT at 18-24 months; if patient is   low risk for malignancy a non-contrast Chest CT at 18-24 months is optional.   These guidelines do not apply to immunocompromised patients and patients with   cancer. Follow up in patients with significant comorbidities as clinically   warranted.  For lung cancer screening, adhere to Lung-RADS guidelines. Reference: Radiology. 2017; 284(1):228-43.      5/23/22 CT Lumbar Spine WO Contrast:  Impression   No acute fracture.  Severe degenerative changes.  MRI suggested.      5/23/22 XR Chest Portable:  Impression   No acute traumatic findings of the chest       2.2 cm right mid lung rounded nodular focus with attention on CT chest   recommended for further evaluation to exclude underlying malignancy      5/23/22 XR Pelvis:  Impression   No acute traumatic findings of the pelvis identified      5/23/22 VL Dup Carotid Bilateral:  Impression   The right internal carotid artery demonstrates 0-50% stenosis.       The left internal carotid artery demonstrates 0-50% stenosis.       Bilateral vertebral arteries are patent with flow in the normal direction.      5/23/22 MRI Brain WO Contrast:  Impression   No acute intracranial abnormality.       Minor sinus mucosal disease and minor left mastoid mucoperiosteal thickening   of doubtful clinical significance.           Labs:    Recent Results (from the past 24 hour(s))   CBC with Auto Differential    Collection Time: 06/02/22  5:58 AM   Result Value Ref Range    WBC 7.8 4.0 - 10.5 K/CU MM    RBC 4.91 4.2 - 5.4 M/CU MM    Hemoglobin 14.0 12.5 - 16.0 GM/DL    Hematocrit 44.2 37 - 47 %    MCV 90.0 78 - 100 FL    MCH 28.5 27 - 31 PG    MCHC 31.7 (L) 32.0 - 36.0 %    RDW 14.5 11.7 - 14.9 %    Platelets 362 955 - 051 K/CU MM    MPV 10.4 7.5 - 11.1 FL    Differential Type AUTOMATED DIFFERENTIAL     Segs Relative 72.6 (H) 36 - 66 %    Lymphocytes % 13.1 (L) 24 - 44 %    Monocytes % 10.1 (H) 0 - 4 %    Eosinophils % 2.7 0 - 3 %    Basophils % 1.0 0 - 1 %    Segs Absolute 5.7 K/CU MM    Lymphocytes Absolute 1.0 K/CU MM    Monocytes Absolute 0.8 K/CU MM    Eosinophils Absolute 0.2 K/CU MM    Basophils Absolute 0.1 K/CU MM    Nucleated RBC % 0.0 %    Total Nucleated RBC 0.0 K/CU MM    Total Immature Neutrophil 0.04 K/CU MM    Immature Neutrophil % 0.5 (H) 0 - 0.43 % Basic Metabolic Panel    Collection Time: 06/02/22  5:58 AM   Result Value Ref Range    Sodium 138 135 - 145 MMOL/L    Potassium 4.9 3.5 - 5.1 MMOL/L    Chloride 106 99 - 110 mMol/L    CO2 23 21 - 32 MMOL/L    Anion Gap 9 4 - 16    BUN 28 (H) 6 - 23 MG/DL    CREATININE 1.9 (H) 0.6 - 1.1 MG/DL    Glucose 97 70 - 99 MG/DL    Calcium 8.3 8.3 - 10.6 MG/DL    GFR Non- 25 (L) >60 mL/min/1.73m2    GFR  30 (L) >60 mL/min/1.73m2   CK    Collection Time: 06/02/22  5:58 AM   Result Value Ref Range    Total  (H) 26 - 140 IU/L     CULTURE results: Invalid input(s): BLOOD CULTURE,  URINE CULTURE, SURGICAL CULTURE    Diagnosis:  Patient Active Problem List   Diagnosis    Rectal bleeding    Fall at home, initial encounter    Fall    Toxic metabolic encephalopathy    Seizure-like activity (HCC)    Other seizures ([facility] [address]    E coli bacteremia    Bacteremia due to Klebsiella pneumoniae       Active Problems  Principal Problem:    Fall at home, initial encounter  Active Problems:    Fall    Toxic metabolic encephalopathy    Seizure-like activity (HCC)    Other seizures ([facility] [address]    E coli bacteremia    Bacteremia due to Klebsiella pneumoniae  Resolved Problems:    * No resolved hospital problems. *    Electronically signed by: Electronically signed by KIKI Phan CNP on 6/2/2022 at 12:35 PM

## 2022-06-02 NOTE — PROGRESS NOTES
Patient seen and examined  Chart briefly reviewed  Bladder scan, UA and urinary indicis  Add CK level  Full consult to come

## 2022-06-02 NOTE — PROGRESS NOTES
Hospital Medicine Progress Note     Date:  6/1/2022    PCP: Rip Porter MD (Tel: [phone])    Date of Admission: 5/23/2022    Chief complaint:   Chief Complaint   Patient presents with    Fall       Brief admission history: Pleasant 28-year-old female with history of essential hypertension GERD, dementia (on Aricept), history of left lower extremity DVT (on Xarelto), AAA, lung mass, history of recurrent falls, who presents to the emergency room following fall at [facility] facility. On presentation to the emergency room, she has slightly elevated creatinine kinase (of 499, not otherwise qualified as rhabdomyolysis), mild leukocytosis, elevated lactic acid level. EKG was concerning for atrial fibrillation. During hospital stay, neurology was consulted for evaluation of possible toxic-metabolic encephalopathy; however, following unremarkable MRI-brain, it was later determined confusion could be secondary to urinary tract infection. Abnormal EKG was also evaluated by cardiology, determined to be sinus rhythm with bifascicular block and PACs. She has nonzero troponin, which was determined to be secondary to elevated creatinine kinase, possible demand ischemia (type II NSTEMI); and no further work-up per cardiology. Assessment/plan:  1. Fall at assisted living facility. Suspect possible mechanical.  Maintain on fall precautions. Therapy has recommended SNF at discharge. SNF denial being appealed. 2. E. coli and Klebsiella bacteremia. Blood cultures with coagulase-negative Staph, E. coli, Klebsiella pneumonia. Suspected to be GI versus  translocation. Completed course of vancomycin and cefepime on 5/27/2022. ID has recommended oral Bactrim started on 5/27/2022, to complete 14-day course on June 9, 2022, monitor potassium closely  1. On June 1 held the Bactrim and changed to cefepime. Infectious disease to reevaluate in the morning.   3. Sepsis secondary to urinary tract infection, E. coli UTI. Met sepsis criteria on admission based on tachycardia, tachypnea, lactic acidosis. Lactic acidosis has resolved following hydration. 4. Acute kidney injury noted on hospital day 9, June 1. Will DC lisinopril. We will hold the Bactrim. Consult nephrology in the morning. Start IV fluids. 5. Patient has nonspecific creatinine kinase, does not otherwise qualify as rhabdomyolysis. Initial creatinine kinase was 499, repeat was 151.  6. Constipation. Abdominal exam is benign. Bowel regimen in place - d/w nursing staff. 7. Chronic anticoagulation with Xarelto- since creatinine clearance is 11 we will hold Xarelto for now, and just use prophylaxis to take subcu heparin for now  8. Chronic lower extremity DVT Per PCP records  9. Other comorbidities: history of essential hypertension GERD, dementia (on aricept), history of left lower extremity DVT (on Xarelto), AAA, lung mass, history of recurrent falls. Diet: ADULT DIET; Regular; Low Fat/Low Chol/High Fiber/2 gm Na    Code status: Full Code   ----------  Subjective  She was lethargic today. Objective  Physical exam:  Vitals: /66   Pulse 85   Temp 99.4 °F (37.4 °C) (Axillary)   Resp 16   Ht 5' (1.524 m)   Wt 116 lb 10 oz (52.9 kg)   SpO2 93%   BMI 22.78 kg/m²      Physical Exam  Constitutional:       Appearance: She is ill-appearing. HENT:      Nose: No rhinorrhea. Eyes:      General:         Right eye: No discharge. Left eye: No discharge. Pulmonary:      Effort: Pulmonary effort is normal.   Skin:     Coloration: Skin is not jaundiced (.TEMPFEMALE). 24HR INTAKE/OUTPUT:      Intake/Output Summary (Last 24 hours) at 6/1/2022 225  Last data filed at 6/1/2022 2050  Gross per 24 hour   Intake 60 ml   Output --   Net 60 ml     I/O last 3 completed shifts:   In: 280 [P.O.:280]  Out: -   I/O this shift:  In: 20 [P.O.:20]  Out: -   Meds:    [START ON 6/2/2022] sulfamethoxazole-trimethoprim  1 tablet Oral Daily    [START ON 6/2/2022] heparin (porcine)  5,000 Units SubCUTAneous 3 times per day    hydrALAZINE  25 mg Oral 2 times per day    chlorhexidine gluconate   Topical Daily    clotrimazole-betamethasone   Topical BID    miconazole   Topical BID    sodium chloride flush  5-40 mL IntraVENous 2 times per day    donepezil  5 mg Oral QPM    lisinopril  40 mg Oral Daily    pantoprazole  40 mg Oral QAM AC     Infusions:    sodium chloride 25 mL/hr at 05/27/22 0231     PRN Meds: bisacodyl, labetalol, sodium chloride flush, sodium chloride, ondansetron **OR** ondansetron, polyethylene glycol, acetaminophen **OR** acetaminophen    Labs/imaging:  CBC:   Recent Labs     05/30/22 0132 06/01/22  0850   WBC 7.0 7.9   HGB 14.5 13.7    260     BMP:    Recent Labs     05/30/22 0132 06/01/22  0850    134*   K 4.5 4.8    100   CO2 22 22   BUN 17 30*   CREATININE 1.1 2.2*   GLUCOSE 99 138*     Hepatic:   Recent Labs     05/30/22 0132   AST 14*   ALT 11   BILITOT 0.4   ALKPHOS 79       MARSHALL Wagner MD  -------------------------------  Sonia hospitalist

## 2022-06-02 NOTE — CARE COORDINATION
LSW received a call from Nelsy with [facility] and the appeal has gone through and the denial was over turned and has been approved to go to the skilled unit at [facility]. Doctor aware. PASS completed. Pt will need a rapid COVID and CRISTINA needs to be completed by RN and doctor at discharge. If pt is discharged after hours please complete the following. ... Call report to [phone]   Fax completed AVS with both CRISTINA on the AVS and any written Rx [phone]. Set up transportation [facility] [phone] or [facility] [employer] [phone] and call family.

## 2022-06-02 NOTE — PROGRESS NOTES
Physical Therapy  Attempted to see pt this AM but pt fatigued. Attempted to see pt this PM but pt MARISSA for imaging. Will continue to attempt as schedule allows.

## 2022-06-02 NOTE — DISCHARGE INSTR - COC
Continuity of Care Form    Patient Name: Oswaldo Tabor   :  1927  MRN:  [id number]    Admit date:  2022  Discharge date:  ***    Code Status Order: Limited   Advance Directives:      Admitting Physician:  Whit Guerra MD  PCP: Kenneth Porter MD    Discharging Nurse: [facility] Unit/Room#: 9463/7422-Q  Discharging Unit Phone Number: ***    Emergency Contact:   Extended Emergency Contact Information  Primary Emergency Contact: Michel Shetty [place] [address] [place] [address] Phone: [phone]  Mobile Phone: [phone]  Relation: Other  Secondary Emergency Contact: [address] Phone: [phone]  Relation: Other    Past Surgical History:  Past Surgical History:   Procedure Laterality Date    COLONOSCOPY N/A 2021    COLONOSCOPY DIAGNOSTIC performed by Nena Ennis MD at [facility] ENDOSCOPY       Immunization History:   Immunization History   Administered Date(s) Administered    COVID-19, Pfizer Purple top, DILUTE for use, 12+ yrs, 30mcg/0.3mL dose 2020, 2021, 10/06/2021    Influenza, High Dose (Fluzone 65 yrs and older) 10/08/2020       Active Problems:  Patient Active Problem List   Diagnosis Code    Rectal bleeding K62.5    Fall at home, initial encounter W19. Ali Smoker, Y92.009    Fall W19. XXXA    Toxic metabolic encephalopathy Y80.4    Seizure-like activity ([address] R56.9    Other seizures ([address] G40.89    E coli bacteremia R78.81, B96.20    Bacteremia due to Klebsiella pneumoniae R78.81, B96.1       Isolation/Infection:   Isolation            No Isolation          Patient Infection Status       Infection Onset Added Last Indicated Last Indicated By Review Planned Expiration Resolved Resolved By    None active    Resolved    COVID-19 (Rule Out) 22 COVID-19, Rapid (Ordered)   22 Rule-Out Test Resulted    COVID-19 (Rule Out) 20 Covid-19 Ambulatory (Ordered)   20 Rule-Out Test Resulted            Nurse Assessment:  Last Vital Signs: BP (!) 105/50   Pulse 94   Temp 98 °F (36.7 °C) (Axillary)   Resp 16   Ht 5' (1.524 m)   Wt 116 lb 10 oz (52.9 kg)   SpO2 95%   BMI 22.78 kg/m²     Last documented pain score (0-10 scale): Pain Level: 0  Last Weight:   Wt Readings from Last 1 Encounters:   06/01/22 116 lb 10 oz (52.9 kg)     Mental Status:  {IP PT MENTAL STATUS:20030}    IV Access:  { CRISTINA IV ACCESS:947870324}    Nursing Mobility/ADLs:  Walking   {P DME LDNB:103006695}  Transfer  {P DME ZAXS:034681378}  Bathing  {P DME QSXP:445573159}  Dressing  {CHP DME QBVZ:252587530}  Toileting  {CHP DME KOFO:314857852}  Feeding  {P DME UEOE:479050418}  Med Admin  {[facility] DME RHAZ:319379136}  Med Delivery   { CRISTINA MED Delivery:018020658}    Wound Care Documentation and Therapy:  Wound 05/24/22 Sternum (Active)   Wound Image   05/24/22 1030   Wound Etiology Other 06/01/22 2047   Dressing Status Other (Comment) 06/01/22 2047   Wound Cleansed Cleansed with saline 06/01/22 2047   Dressing/Treatment Pharmaceutical agent (see MAR) 06/01/22 2047   Wound Length (cm) 5 cm 05/24/22 1030   Wound Width (cm) 16 cm 05/24/22 1030   Wound Depth (cm) 0.1 cm 05/24/22 1030   Wound Surface Area (cm^2) 80 cm^2 05/24/22 1030   Wound Volume (cm^3) 8 cm^3 05/24/22 1030   Distance Tunneling (cm) 0 cm 06/01/22 2047   Tunneling Position ___ O'Clock 0 06/01/22 2047   Undermining Starts ___ O'Clock 0 06/01/22 2047   Undermining Ends___ O'Clock 0 06/01/22 2047   Undermining Maxium Distance (cm) 0 06/01/22 2047   Wound Assessment Pink/red 06/01/22 2047   Drainage Amount None 06/01/22 2047   Drainage Description Serosanguinous 06/01/22 2047   Odor None 06/01/22 2047   Sona-wound Assessment Intact 06/01/22 2047   Margins Defined edges 06/01/22 2047   Wound Thickness Description not for Pressure Injury Partial thickness 06/01/22 2047   Number of days: 9       Wound 05/24/22 Heel Left (Active)   Wound Image   05/24/22 1030   Wound Etiology Deep tissue/Injury 06/01/22 2047   Wound Cleansed Not Cleansed 06/01/22 2047   Dressing/Treatment Open to air 06/01/22 2047   Wound Length (cm) 1 cm 05/24/22 1030   Wound Width (cm) 1.5 cm 05/24/22 1030   Wound Depth (cm) 0 cm 05/24/22 1030   Wound Surface Area (cm^2) 1.5 cm^2 05/24/22 1030   Wound Volume (cm^3) 0 cm^3 05/24/22 1030   Distance Tunneling (cm) 0 cm 06/01/22 2047   Tunneling Position ___ O'Clock 0 06/01/22 2047   Undermining Starts ___ O'Clock 0 06/01/22 2047   Undermining Ends___ O'Clock 0 06/01/22 2047   Undermining Maxium Distance (cm) 0 06/01/22 2047   Wound Assessment Purple/maroon 06/01/22 2047   Drainage Amount None 06/01/22 2047   Odor None 06/01/22 2047   Margins Attached edges 06/01/22 2047   Number of days: 9       Wound 05/24/22 Coccyx (Active)   Wound Image   05/24/22 1030   Wound Etiology Pressure Stage 2 06/01/22 2047   Dressing Status Clean;Dry; Intact 06/01/22 2047   Wound Cleansed Cleansed with saline 06/01/22 2047   Dressing/Treatment Silicone border 99/28/06 2047   Wound Length (cm) 0.5 cm 05/24/22 1030   Wound Width (cm) 1 cm 05/24/22 1030   Wound Depth (cm) 0.1 cm 05/24/22 1030   Wound Surface Area (cm^2) 0.5 cm^2 05/24/22 1030   Wound Volume (cm^3) 0.05 cm^3 05/24/22 1030   Distance Tunneling (cm) 0 cm 06/01/22 2047   Tunneling Position ___ O'Clock 0 06/01/22 2047   Undermining Starts ___ O'Clock 0 06/01/22 2047   Undermining Ends___ O'Clock 0 06/01/22 2047   Undermining Maxium Distance (cm) 0 06/01/22 2047   Wound Assessment Pink/red 06/01/22 2047   Drainage Amount None 06/01/22 2047   Drainage Description Serosanguinous 06/01/22 2047   Odor None 06/01/22 2047   Sona-wound Assessment Blanchable erythema 06/01/22 2047   Margins Defined edges 06/01/22 2047   Wound Thickness Description not for Pressure Injury Partial thickness 06/01/22 2047   Number of days: 9        Elimination:  Continence:    Bowel: {YES / EW:73844}  Bladder: {YES / WH:62461}  Urinary Catheter: None Colostomy/Ileostomy/Ileal Conduit: {YES / NR:13471}       Date of Last BM: ***    Intake/Output Summary (Last 24 hours) at 6/2/2022 1535  Last data filed at 6/2/2022 1100  Gross per 24 hour   Intake 170 ml   Output --   Net 170 ml     I/O last 3 completed shifts: In: 61 [P.O.:60]  Out: -     Safety Concerns:     Sundowners [place], History of Falls (last 30 days), and At Risk for Falls    Impairments/Disabilities:      Vision      Patient's personal belongings (please select all that are sent with patient):  Glasses    RN SIGNATURE:  Electronically signed by Leela Malcolm LPN on 6/0/24 at 4:40 PM EDT      PHYSICIAN SECTION    Nutrition Therapy:  Current Nutrition Therapy:   - Oral Diet:  General  - Oral Nutrition Supplement:  Standard  twice a day    Routes of Feeding: Oral  Liquids: Thin Liquids  Daily Fluid Restriction: no  Last Modified Barium Swallow with Video (Video Swallowing Test): not done    Treatments at the Time of Hospital Discharge:   Respiratory Treatments: none  Oxygen Therapy:  is not on home oxygen therapy. Ventilator:    - No ventilator support    Rehab Therapies: Physical Therapy, Occupational Therapy, and Speech/Language Therapy  Weight Bearing Status/Restrictions: No weight bearing restrictions  Other Medical Equipment (for information only, NOT a DME order):  per therapy  Other Treatments:     Wound care- sternum/groins cleanse with NS apply lotrisone cream every shift and prn then DriGO wicking cloth change every 3 days and prn. Sacrum cleanse with NS apply collagen-puracol and silicone foam border. Change every 3 days and prn. Left heel deep tissue injury keep heels floated. Pt is at mild risk for skin breakdown AEB shey. Follow shey orders. Check BP twice  day, hold hydralazine if SBP is less than 120    Prognosis: Fair    Condition at Discharge: Stable    Rehab Potential (if transferring to Rehab):  Fair    Recommended Labs or Other Treatments After Discharge:     Continue IV cefepime 1 gm q12h (renally dosed) with end date 6/8/22  De-colonize MssA, Hib[place]len daily baths x 10 days (end date 6/6/22)  Weekly labs drawn on Monday during the course of treatment  CBC with differential, CMP, ESR, CRP  Fax results to Attn: Paulie Mehdi Infectious Diseases Staff  # [phone]      CBC in 1 week    CMP every week times 4     GIVE IV fluids  mL every other day for 5 doses - run at 100 mL per hour    Call Dr. Kateryna Carreon with any concern about IV fluids or fluids or electrolytes, his phone is [phone]    Physician Certification: I certify the above information and transfer of Jareth Rai  is necessary for the continuing treatment of the diagnosis listed and that she requires [place] for less 30 days.      Update Admission H&P: No change in H&P    PHYSICIAN SIGNATURE:  Electronically signed by Brody Gold MD on 6/3/22 at 1:37 PM EDT

## 2022-06-02 NOTE — PLAN OF CARE
Problem: Discharge Planning  Goal: Discharge to home or other facility with appropriate resources  Outcome: Progressing     Problem: Safety - Adult  Goal: Free from fall injury  Outcome: Progressing     Problem: Skin/Tissue Integrity  Goal: Absence of new skin breakdown  Description: 1. Monitor for areas of redness and/or skin breakdown  2. Assess vascular access sites hourly  3. Every 4-6 hours minimum:  Change oxygen saturation probe site  4. Every 4-6 hours:  If on nasal continuous positive airway pressure, respiratory therapy assess nares and determine need for appliance change or resting period.   Outcome: Progressing     Problem: ABCDS Injury Assessment  Goal: Absence of physical injury  Outcome: Progressing     Problem: Pain  Goal: Verbalizes/displays adequate comfort level or baseline comfort level  Outcome: Progressing     Problem: Nutrition Deficit:  Goal: Optimize nutritional status  Outcome: Progressing

## 2022-06-02 NOTE — CONSULTS
Patient:   James Kelley    Date:  22  :  1927, 80 y.o. Nephrologist: Alexandre Early MD  Provider: Valentino Porter MD    Reason for Consult: Acute kidney injury and underlying CKD stage III    Consult requested by : Dr Britney Peguero    Chief Complaint:    fall at assisted living    HISTORY OF PRESENT ILLNESS:   Ms. Arian Kaufman is a 80year-old assisted-living resident, who was brought to the emergency department after sustaining a fall via squad on May 23, 2022. Upon arrival in the emergency department, her blood pressure was rather high, recorded 170s over 80s. She was otherwise afebrile with good oxygen saturation on ambient air. She underwent several diagnostic test, which include imaging and biochemical.  She had multiple imaging study which include MRI of the brain, bilateral carotid ultrasound, x-ray of the pelvis, CT thorax of spine without contrast, CT of the lumbar spine, CT of the thoracic spine, CT head,. As well as chest x-ray. All of the CT was done without IV iodinated contrast.  None of them was revealing-fortunately any fracture or abnormality, other than incidental finding, which likely has no clinical relevance for 89 years inpatient. Biochemical testing, especially renal function showed creatinine of 0.8 and BUN of 10 with all normal electrolytes, blood sugar is 126. Her baseline creatinine seems to be 0.9 to 1.0 mg/dL. Other pertinent abnormal lab include high white cell of 11.8, significantly high hemoglobin of 7.1, likely from volume depletion and hemoconcentration. Relatively high proBNP of 866, CPK level of only 499, lactate of 2.6. Several body fluid culture were obtained. Empirical antimicrobial agent were initiated, initially hypotonic solution such as half-normal saline 1 L normal saline was initiated. She was subsequently admitted for further evaluation and management to medical floor.     She was on several antimicrobial agent including  bisacodyl (DULCOLAX) suppository 10 mg  10 mg Rectal Daily PRN Delta Khan MD        hydrALAZINE (APRESOLINE) tablet 25 mg  25 mg Oral 2 times per day Jensen Montalvo MD   25 mg at 06/01/22 2041    labetalol (NORMODYNE;TRANDATE) injection 10 mg  10 mg IntraVENous Q4H PRN Delta Khan MD   10 mg at 05/27/22 2129    chlorhexidine gluconate (ANTISEPTIC SKIN CLEANSER) 4 % solution   Topical Daily KIKI June CNP   Given at 06/01/22 1496    clotrimazole-betamethasone (LOTRISONE) cream   Topical BID Camilla Bowman MD   Given at 06/01/22 2051    miconazole (MICOTIN) 2 % powder   Topical BID Emily Metcalf DO   Given at 06/01/22 2051    sodium chloride flush 0.9 % injection 5-40 mL  5-40 mL IntraVENous 2 times per day KIKI Early CNP   10 mL at 06/01/22 2050    sodium chloride flush 0.9 % injection 5-40 mL  5-40 mL IntraVENous PRN KIKI Early CNP   10 mL at 05/23/22 1935    0.9 % sodium chloride infusion   IntraVENous PRN KIKI Early CNP 25 mL/hr at 05/27/22 0231 New Bag at 05/27/22 0231    ondansetron (ZOFRAN-ODT) disintegrating tablet 4 mg  4 mg Oral Q8H PRN KIKI Early CNP        Or    ondansetron (ZOFRAN) injection 4 mg  4 mg IntraVENous Q6H PRN KIKI Early CNP        polyethylene glycol (GLYCOLAX) packet 17 g  17 g Oral Daily PRN KIKI Early CNP        acetaminophen (TYLENOL) tablet 650 mg  650 mg Oral Q6H PRN KIKI Early CNP   650 mg at 05/29/22 2145    Or    acetaminophen (TYLENOL) suppository 650 mg  650 mg Rectal Q6H PRN KIKI Early CNP        donepezil (ARICEPT) tablet 5 mg  5 mg Oral QPM KIKI Early CNP   5 mg at 06/01/22 2041    pantoprazole (PROTONIX) tablet 40 mg  40 mg Oral QAM AC KIKI Early CNP   40 mg at 06/02/22 0615       BP (!) 114/57   Pulse 94   Temp 99.4 °F (37.4 °C) (Axillary)   Resp 12   Ht 5' (1.524 m)   Wt 116 lb 10 oz (52.9 kg)   SpO2 93%   BMI 22.78 kg/m²     PHYSICAL EXAM:  General appearance: Alert, awake but pleasantly confused and is not oriented  HEENT: At least 1+ conjunctival pallor  Neck: Supple  Heart: Regular rate and rhythm  LUNGS: No crackles on auscultation  Abdomen: Soft, tender as she screamed on palpation-she has diaper  Extremities: No edema    LABS:  CBC:   Lab Results   Component Value Date    WBC 7.8 06/02/2022    WBC 7.9 06/01/2022    WBC 7.0 05/30/2022    HGB 14.0 06/02/2022    HGB 13.7 06/01/2022    HGB 14.5 05/30/2022     06/02/2022     06/01/2022     05/30/2022     Renal Panel:   Lab Results   Component Value Date     06/02/2022     06/01/2022     05/30/2022    K 4.9 06/02/2022    K 4.8 06/01/2022    K 4.5 05/30/2022     06/02/2022     06/01/2022     05/30/2022    CO2 23 06/02/2022    CO2 22 06/01/2022    CO2 22 05/30/2022    BUN 28 06/02/2022    BUN 30 06/01/2022    BUN 17 05/30/2022    CREATININE 1.9 06/02/2022    CREATININE 2.2 06/01/2022    CREATININE 1.1 05/30/2022    GFRAA 30 06/02/2022    GFRAA 25 06/01/2022    GFRAA 56 05/30/2022    LABGLOM 25 06/02/2022    LABGLOM 21 06/01/2022    LABGLOM 46 05/30/2022    LABALBU 3.5 05/30/2022    LABALBU 3.2 05/24/2022    LABALBU 4.1 05/23/2022         Calcium:    Lab Results   Component Value Date    CALCIUM 8.3 06/02/2022     Phosphorus:    Lab Results   Component Value Date    PHOS 3.6 05/30/2022       U/A:    Lab Results   Component Value Date    PROTEINU 100 05/24/2022    NITRU NEGATIVE 05/24/2022    COLORU YELLOW 05/24/2022    WBCUA 265 05/24/2022    RBCUA 42 05/24/2022    MUCUS FEW 05/24/2022    TRICHOMONAS NONE SEEN 05/24/2022    BACTERIA NEGATIVE 05/24/2022    CLARITYU SLIGHTLY CLOUDY 05/24/2022    SPECGRAV 1.015 05/24/2022    UROBILINOGEN 0.2 05/24/2022    BILIRUBINUR NEGATIVE 05/24/2022    BLOODU MODERATE 05/24/2022    KETUA NEGATIVE 05/24/2022           IMPRESSION:  1.  Acute kidney injury-first  a fall, rule out bladder obstruction especially with abdominal pain, other potential etiology, sepsis with E. coli, labile/fluctuation of blood pressure, nephrotoxic medication, including antimicrobial agent, acute interstitial nephritis. Less likely rhabdomyolysis-but we will redo the UA and urinary indicis and CK. On initial presentation she likely had volume depletion as evidenced by high H&H -she was given appropriately IV fluid . level again  2. Fall, advanced age, underlying hypertension  3. E. coli sepsis likely urinary pathogen    PLAN:    1. Start with UA and urinary indicis  2. Add CK level  3. Stop normal saline after 1.5 L  4. Avoid any nonessential medication  5. Adjust antibiotic as more data becomes available  6.  Follow clinically and biochemically

## 2022-06-03 ENCOUNTER — APPOINTMENT (OUTPATIENT)
Dept: CT IMAGING | Age: 87
DRG: 871 | End: 2022-06-03
Payer: MEDICARE

## 2022-06-03 VITALS
TEMPERATURE: 97.6 F | WEIGHT: 116.62 LBS | RESPIRATION RATE: 19 BRPM | DIASTOLIC BLOOD PRESSURE: 68 MMHG | BODY MASS INDEX: 22.9 KG/M2 | SYSTOLIC BLOOD PRESSURE: 117 MMHG | OXYGEN SATURATION: 93 % | HEART RATE: 92 BPM | HEIGHT: 60 IN

## 2022-06-03 PROBLEM — I71.40 AAA (ABDOMINAL AORTIC ANEURYSM): Status: ACTIVE | Noted: 2022-06-03

## 2022-06-03 PROBLEM — I35.0 MODERATE AORTIC STENOSIS: Status: ACTIVE | Noted: 2022-06-03

## 2022-06-03 LAB
ALBUMIN SERPL-MCNC: 3.2 GM/DL (ref 3.4–5)
ALP BLD-CCNC: 69 IU/L (ref 40–129)
ALT SERPL-CCNC: 9 U/L (ref 10–40)
ANION GAP SERPL CALCULATED.3IONS-SCNC: 9 MMOL/L (ref 4–16)
AST SERPL-CCNC: 12 IU/L (ref 15–37)
BASOPHILS ABSOLUTE: 0.1 K/CU MM
BASOPHILS RELATIVE PERCENT: 0.8 % (ref 0–1)
BILIRUB SERPL-MCNC: 0.3 MG/DL (ref 0–1)
BUN BLDV-MCNC: 22 MG/DL (ref 6–23)
CALCIUM SERPL-MCNC: 8.5 MG/DL (ref 8.3–10.6)
CHLORIDE BLD-SCNC: 107 MMOL/L (ref 99–110)
CO2: 21 MMOL/L (ref 21–32)
CREAT SERPL-MCNC: 1.5 MG/DL (ref 0.6–1.1)
DIFFERENTIAL TYPE: ABNORMAL
EOSINOPHILS ABSOLUTE: 0.2 K/CU MM
EOSINOPHILS RELATIVE PERCENT: 2.4 % (ref 0–3)
GFR AFRICAN AMERICAN: 39 ML/MIN/1.73M2
GFR NON-AFRICAN AMERICAN: 32 ML/MIN/1.73M2
GLUCOSE BLD-MCNC: 146 MG/DL (ref 70–99)
HCT VFR BLD CALC: 43.5 % (ref 37–47)
HEMOGLOBIN: 13.4 GM/DL (ref 12.5–16)
IMMATURE NEUTROPHIL %: 0.5 % (ref 0–0.43)
LYMPHOCYTES ABSOLUTE: 1 K/CU MM
LYMPHOCYTES RELATIVE PERCENT: 13 % (ref 24–44)
MAGNESIUM: 2.6 MG/DL (ref 1.8–2.4)
MCH RBC QN AUTO: 28.7 PG (ref 27–31)
MCHC RBC AUTO-ENTMCNC: 30.8 % (ref 32–36)
MCV RBC AUTO: 93.1 FL (ref 78–100)
MONOCYTES ABSOLUTE: 0.6 K/CU MM
MONOCYTES RELATIVE PERCENT: 8.2 % (ref 0–4)
NUCLEATED RBC %: 0 %
PDW BLD-RTO: 14.7 % (ref 11.7–14.9)
PHOSPHORUS: 2.9 MG/DL (ref 2.5–4.9)
PLATELET # BLD: 250 K/CU MM (ref 140–440)
PMV BLD AUTO: 10.8 FL (ref 7.5–11.1)
POTASSIUM SERPL-SCNC: 5.2 MMOL/L (ref 3.5–5.1)
RBC # BLD: 4.67 M/CU MM (ref 4.2–5.4)
SARS-COV-2, NAAT: NOT DETECTED
SEGMENTED NEUTROPHILS ABSOLUTE COUNT: 5.6 K/CU MM
SEGMENTED NEUTROPHILS RELATIVE PERCENT: 75.1 % (ref 36–66)
SODIUM BLD-SCNC: 137 MMOL/L (ref 135–145)
SOURCE: NORMAL
TOTAL IMMATURE NEUTOROPHIL: 0.04 K/CU MM
TOTAL NUCLEATED RBC: 0 K/CU MM
TOTAL PROTEIN: 5.7 GM/DL (ref 6.4–8.2)
WBC # BLD: 7.5 K/CU MM (ref 4–10.5)

## 2022-06-03 PROCEDURE — 6360000002 HC RX W HCPCS: Performed by: INTERNAL MEDICINE

## 2022-06-03 PROCEDURE — 6370000000 HC RX 637 (ALT 250 FOR IP): Performed by: NURSE PRACTITIONER

## 2022-06-03 PROCEDURE — 36410 VNPNXR 3YR/> PHY/QHP DX/THER: CPT

## 2022-06-03 PROCEDURE — 2500000003 HC RX 250 WO HCPCS: Performed by: EMERGENCY MEDICINE

## 2022-06-03 PROCEDURE — 99232 SBSQ HOSP IP/OBS MODERATE 35: CPT | Performed by: NURSE PRACTITIONER

## 2022-06-03 PROCEDURE — 85025 COMPLETE CBC W/AUTO DIFF WBC: CPT

## 2022-06-03 PROCEDURE — 84100 ASSAY OF PHOSPHORUS: CPT

## 2022-06-03 PROCEDURE — 36415 COLL VENOUS BLD VENIPUNCTURE: CPT

## 2022-06-03 PROCEDURE — 76937 US GUIDE VASCULAR ACCESS: CPT

## 2022-06-03 PROCEDURE — 94761 N-INVAS EAR/PLS OXIMETRY MLT: CPT

## 2022-06-03 PROCEDURE — 80053 COMPREHEN METABOLIC PANEL: CPT

## 2022-06-03 PROCEDURE — 05HB33Z INSERTION OF INFUSION DEVICE INTO RIGHT BASILIC VEIN, PERCUTANEOUS APPROACH: ICD-10-PCS | Performed by: INTERNAL MEDICINE

## 2022-06-03 PROCEDURE — 6370000000 HC RX 637 (ALT 250 FOR IP): Performed by: INTERNAL MEDICINE

## 2022-06-03 PROCEDURE — 2709999900 HC NON-CHARGEABLE SUPPLY

## 2022-06-03 PROCEDURE — 83735 ASSAY OF MAGNESIUM: CPT

## 2022-06-03 PROCEDURE — 70450 CT HEAD/BRAIN W/O DYE: CPT

## 2022-06-03 PROCEDURE — 2580000003 HC RX 258: Performed by: INTERNAL MEDICINE

## 2022-06-03 PROCEDURE — 2580000003 HC RX 258: Performed by: NURSE PRACTITIONER

## 2022-06-03 PROCEDURE — 87635 SARS-COV-2 COVID-19 AMP PRB: CPT

## 2022-06-03 RX ORDER — HYDRALAZINE HYDROCHLORIDE 25 MG/1
25 TABLET, FILM COATED ORAL EVERY 12 HOURS SCHEDULED
Qty: 90 TABLET | Refills: 3
Start: 2022-06-03 | End: 2022-08-08

## 2022-06-03 RX ORDER — CLOTRIMAZOLE AND BETAMETHASONE DIPROPIONATE 10; .64 MG/G; MG/G
CREAM TOPICAL
Qty: 1 EACH | Refills: 0
Start: 2022-06-03 | End: 2022-06-15

## 2022-06-03 RX ORDER — POLYETHYLENE GLYCOL 3350 17 G/17G
17 POWDER, FOR SOLUTION ORAL DAILY PRN
Qty: 527 G | Refills: 1
Start: 2022-06-03 | End: 2022-07-03

## 2022-06-03 RX ORDER — BISACODYL 10 MG
10 SUPPOSITORY, RECTAL RECTAL DAILY PRN
Qty: 1 SUPPOSITORY | Refills: 0
Start: 2022-06-03 | End: 2022-07-03

## 2022-06-03 RX ADMIN — HEPARIN SODIUM 5000 UNITS: 5000 INJECTION, SOLUTION INTRAVENOUS; SUBCUTANEOUS at 14:19

## 2022-06-03 RX ADMIN — SODIUM CHLORIDE, PRESERVATIVE FREE 10 ML: 5 INJECTION INTRAVENOUS at 08:50

## 2022-06-03 RX ADMIN — CEFEPIME HYDROCHLORIDE 1000 MG: 1 INJECTION, POWDER, FOR SOLUTION INTRAMUSCULAR; INTRAVENOUS at 00:26

## 2022-06-03 RX ADMIN — CHLORHEXIDINE GLUCONATE 120 ML: 4 LIQUID TOPICAL at 08:54

## 2022-06-03 RX ADMIN — HEPARIN SODIUM 5000 UNITS: 5000 INJECTION, SOLUTION INTRAVENOUS; SUBCUTANEOUS at 06:00

## 2022-06-03 RX ADMIN — MICONAZOLE NITRATE: 2 POWDER TOPICAL at 08:49

## 2022-06-03 RX ADMIN — PANTOPRAZOLE SODIUM 40 MG: 40 TABLET, DELAYED RELEASE ORAL at 06:00

## 2022-06-03 RX ADMIN — CLOTRIMAZOLE AND BETAMETHASONE DIPROPIONATE: 10; .5 CREAM TOPICAL at 08:50

## 2022-06-03 RX ADMIN — CEFEPIME HYDROCHLORIDE 1000 MG: 1 INJECTION, POWDER, FOR SOLUTION INTRAMUSCULAR; INTRAVENOUS at 11:04

## 2022-06-03 ASSESSMENT — PAIN SCALES - GENERAL: PAINLEVEL_OUTOF10: 0

## 2022-06-03 ASSESSMENT — PAIN SCALES - WONG BAKER: WONGBAKER_NUMERICALRESPONSE: 0

## 2022-06-03 NOTE — PROGRESS NOTES
Nephrology Progress Note  6/3/2022 9:21 AM        Subjective:   Admit Date: 5/23/2022  PCP: Snehal Porter MD    Interval History:  lethargic, sleeping this morning, I could not wake her up    Diet:  unsure whether she is eating anything    ROS:   Lethargic   urine output was not recorded   no fever and acceptable blood pressure      Data:     Current meds:    hydrALAZINE  25 mg Oral 2 times per day    heparin (porcine)  5,000 Units SubCUTAneous 3 times per day    cefepime  1,000 mg IntraVENous Q12H    chlorhexidine gluconate   Topical Daily    clotrimazole-betamethasone   Topical BID    miconazole   Topical BID    sodium chloride flush  5-40 mL IntraVENous 2 times per day    donepezil  5 mg Oral QPM    pantoprazole  40 mg Oral QAM AC      sodium chloride 75 mL/hr at 06/02/22 1212    sodium chloride 25 mL/hr at 05/27/22 0231         I/O last 3 completed shifts: In: 186 [P.O.:186]  Out: -     CBC:   Recent Labs     06/01/22  0850 06/02/22  0558   WBC 7.9 7.8   HGB 13.7 14.0    232          Recent Labs     06/01/22  0850 06/02/22  0558   * 138   K 4.8 4.9    106   CO2 22 23   BUN 30* 28*   CREATININE 2.2* 1.9*   GLUCOSE 138* 97       Lab Results   Component Value Date    CALCIUM 8.3 06/02/2022    PHOS 3.6 05/30/2022       Objective:     Vitals: BP (!) 122/57   Pulse 95   Temp 98.1 °F (36.7 °C) (Oral)   Resp 18   Ht 5' (1.524 m)   Wt 116 lb 10 oz (52.9 kg)   SpO2 90%   BMI 22.78 kg/m² ,    General appearance:   lethargic, sleeping in bed  HEENT:   positive conjunctival pallor  Neck:   seems supple  Lungs:   coarse breath sounds on posterior auscultated.   Exam  Heart:  Seems regular rate and rhythm  Abdomen:  soft  Extremities:   trace edema      Problem List :         Impression :     1.  acute kidney injury with underlying CKD stage III- unsure about urine output- potential etiology, sepsis with E. coli, labile blood pressure and relative volume depletion- acute interstitial nephritis is a possibility- unfortunately I do not see a urine specimen and result  2.  sepsis with E. coli likely genitourinary source as both blood culture and culture has the same organism  3.  fall at assisted living    Recommendation/Plan  :     1.  if possible I will request the nurse to send urine specimen  2.  review labs when available  3.  stop IV fluid now  4.  encourage p.o. food and fluid  5.  appropriate treatment for infection, tailor antibiotic as more  information becomes available  6.  follow clinically and biochemically      Mike Ware MD MD

## 2022-06-03 NOTE — PROGRESS NOTES
Hospital Medicine Progress Note     Date:  6/2/2022    PCP: Robert Porter MD (Tel: [phone])    Date of Admission: 5/23/2022    Chief complaint:   Chief Complaint   Patient presents with    Fall       Brief admission history: Pleasant 27-year-old female with history of essential hypertension GERD, dementia (on Aricept), history of left lower extremity DVT (on Xarelto), AAA, lung mass, history of recurrent falls, who presents to the emergency room following fall at [facility] facility. On presentation to the emergency room, she has slightly elevated creatinine kinase (of 499, not otherwise qualified as rhabdomyolysis), mild leukocytosis, elevated lactic acid level. EKG was concerning for atrial fibrillation. During hospital stay, neurology was consulted for evaluation of possible toxic-metabolic encephalopathy; however, following unremarkable MRI-brain, it was later determined confusion could be secondary to urinary tract infection. Abnormal EKG was also evaluated by cardiology, determined to be sinus rhythm with bifascicular block and PACs. She has nonzero troponin, which was determined to be secondary to elevated creatinine kinase, possible demand ischemia (type II NSTEMI); and no further work-up per cardiology. Assessment/plan:  1. Fall at assisted living facility. Suspect possible mechanical.  Maintain on fall precautions. Therapy has recommended SNF at discharge. SNF denial being appealed. 2. E. coli and Klebsiella bacteremia. Blood cultures with coagulase-negative Staph, E. coli, Klebsiella pneumonia. Suspected to be GI versus  translocation. Completed course of vancomycin and cefepime on 5/27/2022. ID has recommended oral Bactrim started on 5/27/2022, to complete 14-day course on June 9, 2022, monitor potassium closely  1. On June 1 held the Bactrim and changed to cefepime. Infectious disease to reevaluate in the morning.   3. Sepsis secondary to urinary tract infection, E. coli UTI. Met sepsis criteria on admission based on tachycardia, tachypnea, lactic acidosis. Lactic acidosis has resolved following hydration. 4. Acute kidney injury noted on hospital day 9, June 1. Will DC lisinopril. We will hold the Bactrim. Consult nephrology in the morning. Start IV fluids. On June 2 creatinine is trending down. She is not ready for discharge today, as creatinine is still high, and nephrology evaluation is in progress. If stabilized consider discharge tomorrow. Noted that the SNF denial has been overturned today. 5. Patient has nonspecific creatinine kinase, does not otherwise qualify as rhabdomyolysis. Initial creatinine kinase was 499, repeat was 151.  6. Constipation. Abdominal exam is benign. Bowel regimen in place - d/w nursing staff. 7. Chronic anticoagulation with Xarelto- since creatinine clearance is 11 we will hold Xarelto for now, and just use prophylaxis to take subcu heparin for now  8. Chronic lower extremity DVT Per PCP records  9. Other comorbidities: history of essential hypertension GERD, dementia (on aricept), history of left lower extremity DVT (on Xarelto), AAA, lung mass, history of recurrent falls. Diet: ADULT DIET; Regular; Low Fat/Low Chol/High Fiber/2 gm Na    Code status: Limited   ----------  Subjective  She was more awake today    Objective  Physical exam:  Vitals: BP (!) 105/50   Pulse 94   Temp 98 °F (36.7 °C) (Axillary)   Resp 16   Ht 5' (1.524 m)   Wt 116 lb 10 oz (52.9 kg)   SpO2 95%   BMI 22.78 kg/m²      Physical Exam  Eyes:      General:         Left eye: No discharge. Pulmonary:      Effort: Pulmonary effort is normal.   Skin:     Coloration: Skin is not jaundiced. Neurological:      Mental Status: She is alert. Cranial Nerves: No cranial nerve deficit.           24HR INTAKE/OUTPUT:      Intake/Output Summary (Last 24 hours) at 6/2/2022 2011  Last data filed at 6/2/2022 1100  Gross per 24 hour   Intake 170 ml   Output -- Net 170 ml     I/O last 3 completed shifts: In: 210 [P.O.:210]  Out: -   No intake/output data recorded. Meds:    hydrALAZINE  25 mg Oral 2 times per day    heparin (porcine)  5,000 Units SubCUTAneous 3 times per day    cefepime  1,000 mg IntraVENous Q12H    chlorhexidine gluconate   Topical Daily    clotrimazole-betamethasone   Topical BID    miconazole   Topical BID    sodium chloride flush  5-40 mL IntraVENous 2 times per day    donepezil  5 mg Oral QPM    pantoprazole  40 mg Oral QAM AC     Infusions:    sodium chloride 75 mL/hr at 06/02/22 1212    sodium chloride 25 mL/hr at 05/27/22 0231     PRN Meds: bisacodyl, labetalol, sodium chloride flush, sodium chloride, ondansetron **OR** ondansetron, polyethylene glycol, acetaminophen **OR** acetaminophen    Labs/imaging:  CBC:   Recent Labs     06/01/22  0850 06/02/22  0558   WBC 7.9 7.8   HGB 13.7 14.0    232     BMP:    Recent Labs     06/01/22  0850 06/02/22  0558   * 138   K 4.8 4.9    106   CO2 22 23   BUN 30* 28*   CREATININE 2.2* 1.9*   GLUCOSE 138* 97     Hepatic:   No results for input(s): AST, ALT, ALB, BILITOT, ALKPHOS in the last 72 hours.     Marguerite Zepeda MD  -------------------------------  Rounding hospitalist

## 2022-06-03 NOTE — PROGRESS NOTES
Therapy  Vancomycin 5/24-27  Bactrim 5/27-6/1     History: Interval history noted. Chief complaint: Polymicrobial bacteremia: CoNS, E coli, and K penumoniae. GI vs  source. Denies n/v/d/f or untoward effects of antibiotics. Drowsy, pleasantly confused. Physical Exam:  Vital Signs: BP (!) 122/57   Pulse 95   Temp 98.1 °F (36.7 °C) (Oral)   Resp 18   Ht 5' (1.524 m)   Wt 116 lb 10 oz (52.9 kg)   SpO2 90%   BMI 22.78 kg/m²     Gen: drowsy, arouses to name, remains pleasantly confused  Wounds: C/D/I left heel pressure sore, and erythema, no drainage, on mid chest to under bilateral breasts. .  Chest: no distress and CTA. Good air movement. Room air. Heart: NSR and no MRG. Abd: soft, non-distended, no tenderness, no hepatomegaly. Normoactive bowel sounds. Ext: no clubbing, cyanosis, or edema  Neuro: Mental status intact. CN 2-12 intact and no focal sensory or motor deficits     Radiologic / Imaging / TESTING  5/23/22 CT Head WO Contrast:  Impression   No acute intracranial abnormality.      5/23/22 CT Cervical Spine WO Contrast:  Impression   No acute abnormality of the cervical spine.      5/23/22 CT Thoracic Spine WO Contrast:  Impression   No acute thoracic spine trauma.       8 mm average diameter right upper lobe subpleural pulmonary nodule. Follow-up as clinically warranted according to the Fleischner criteria below.       RECOMMENDATIONS:   8 mm right solid pulmonary nodule within the upper lobe. Recommend a   non-contrast Chest CT at 6-12 months. If patient is high risk for malignancy,   recommend an additional non-contrast Chest CT at 18-24 months; if patient is   low risk for malignancy a non-contrast Chest CT at 18-24 months is optional.   These guidelines do not apply to immunocompromised patients and patients with   cancer. Follow up in patients with significant comorbidities as clinically   warranted. For lung cancer screening, adhere to Lung-RADS guidelines. Reference: Radiology. 2017; 284(1):228-43.      5/23/22 CT Lumbar Spine WO Contrast:  Impression   No acute fracture.  Severe degenerative changes.  MRI suggested.      5/23/22 XR Chest Portable:  Impression   No acute traumatic findings of the chest       2.2 cm right mid lung rounded nodular focus with attention on CT chest   recommended for further evaluation to exclude underlying malignancy      5/23/22 XR Pelvis:  Impression   No acute traumatic findings of the pelvis identified      5/23/22 VL Dup Carotid Bilateral:  Impression   The right internal carotid artery demonstrates 0-50% stenosis.       The left internal carotid artery demonstrates 0-50% stenosis.       Bilateral vertebral arteries are patent with flow in the normal direction.      5/23/22 MRI Brain WO Contrast:  Impression   No acute intracranial abnormality.       Minor sinus mucosal disease and minor left mastoid mucoperiosteal thickening   of doubtful clinical significance. 6/2/22 CT Chest, Abdomen and Pelvis WO Contrast:  Impression   Chest CT:       The lesion seen on the radiograph is compatible with a benign calcified   granuloma, which requires no specific follow-up.  Overall, no suspicious lung   abnormality detected.  No acute abnormality detected within the chest.       Abdomen and pelvis CT:       No abnormality identified to explain poor appetite.       Abdominal aortic aneurysm measuring 4.3 cm maximally, previously 4.1 cm.  See   recommendations below.       Extensive diverticulosis of the large bowel, especially the region of the   sigmoid colon, but without convincing CT evidence of diverticulitis.       Moderate hiatal hernia.       RECOMMENDATIONS:   For management of fusiform AAA:       4.0-4.4 cm AAA, recommend follow-up every 12 months and recommend vascular   consultation.       References: J Am Ryan Radiol 2013; 10(10):789-794; J Vasc Surg. 2018; 67:2-77     6/2/22 VL Dup Upper Extremity Venous Left:  Impression   No evidence of DVT.      Likely cephalic vein occlusion.           Labs:    Recent Results (from the past 24 hour(s))   CBC with Auto Differential    Collection Time: 06/03/22  8:24 AM   Result Value Ref Range    WBC 7.5 4.0 - 10.5 K/CU MM    RBC 4.67 4.2 - 5.4 M/CU MM    Hemoglobin 13.4 12.5 - 16.0 GM/DL    Hematocrit 43.5 37 - 47 %    MCV 93.1 78 - 100 FL    MCH 28.7 27 - 31 PG    MCHC 30.8 (L) 32.0 - 36.0 %    RDW 14.7 11.7 - 14.9 %    Platelets 500 324 - 673 K/CU MM    MPV 10.8 7.5 - 11.1 FL    Differential Type AUTOMATED DIFFERENTIAL     Segs Relative 75.1 (H) 36 - 66 %    Lymphocytes % 13.0 (L) 24 - 44 %    Monocytes % 8.2 (H) 0 - 4 %    Eosinophils % 2.4 0 - 3 %    Basophils % 0.8 0 - 1 %    Segs Absolute 5.6 K/CU MM    Lymphocytes Absolute 1.0 K/CU MM    Monocytes Absolute 0.6 K/CU MM    Eosinophils Absolute 0.2 K/CU MM    Basophils Absolute 0.1 K/CU MM    Nucleated RBC % 0.0 %    Total Nucleated RBC 0.0 K/CU MM    Total Immature Neutrophil 0.04 K/CU MM    Immature Neutrophil % 0.5 (H) 0 - 0.43 %   Comprehensive Metabolic Panel    Collection Time: 06/03/22  8:24 AM   Result Value Ref Range    Sodium 137 135 - 145 MMOL/L    Potassium 5.2 (H) 3.5 - 5.1 MMOL/L    Chloride 107 99 - 110 mMol/L    CO2 21 21 - 32 MMOL/L    BUN 22 6 - 23 MG/DL    CREATININE 1.5 (H) 0.6 - 1.1 MG/DL    Glucose 146 (H) 70 - 99 MG/DL    Calcium 8.5 8.3 - 10.6 MG/DL    Albumin 3.2 (L) 3.4 - 5.0 GM/DL    Total Protein 5.7 (L) 6.4 - 8.2 GM/DL    Total Bilirubin 0.3 0.0 - 1.0 MG/DL    ALT 9 (L) 10 - 40 U/L    AST 12 (L) 15 - 37 IU/L    Alkaline Phosphatase 69 40 - 129 IU/L    GFR Non- 32 (L) >60 mL/min/1.73m2    GFR  39 (L) >60 mL/min/1.73m2    Anion Gap 9 4 - 16   Phosphorus    Collection Time: 06/03/22  8:24 AM   Result Value Ref Range    Phosphorus 2.9 2.5 - 4.9 MG/DL   Magnesium    Collection Time: 06/03/22  8:24 AM   Result Value Ref Range    Magnesium 2.6 (H) 1.8 - 2.4 mg/dl     CULTURE results: Invalid input(s): BLOOD CULTURE,  URINE CULTURE, SURGICAL CULTURE    Diagnosis:  Patient Active Problem List   Diagnosis    Rectal bleeding    Fall at home, initial encounter    Fall    Toxic metabolic encephalopathy    Seizure-like activity ([address]    Other seizures ([address]    E coli bacteremia    Bacteremia due to Klebsiella pneumoniae       Active Problems  Principal Problem:    Fall at home, initial encounter  Active Problems:    Fall    Toxic metabolic encephalopathy    Seizure-like activity ([address]    Other seizures ([address]    E coli bacteremia    Bacteremia due to Klebsiella pneumoniae  Resolved Problems:    * No resolved hospital problems. *    Electronically signed by: Electronically signed by Nick Sinha.  KIKI Jarquin CNP on 6/3/2022 at 11:19 AM

## 2022-06-03 NOTE — CONSULTS
Consult completed. [employer] 22g 1 inch catheter inserted in patient's RFA. Brisk blood return noted and catheter flushes with ease. Patient tolerated well. Ramiro Brewer, primary LPN notified. Consult IV/PICC team if patient's needs change.

## 2022-06-03 NOTE — CONSULTS
Consult completed. Indication for MidLine = OutPt IV fluid & Abx administration. Procedure/rationale explained to pt & consent obtained. #20ga Arrow Endurance Extended Dwell MidLine Catheter initiated to RUE Basilic Vein using sterile, UltraSound-guided technique without difficulty/complications. Positioning verified via UltraSound visualization of catheter within vessel lumen; site returns blood briskly and flushes without resistance/abnormalities. Sterile dressing with SkinPrep, StatLock Securing Device, BioPatch, SwabCap, and Limb Precautions band applied. Pt tolerated well & no other c/o or needs noted or reported. RN notified.

## 2022-06-03 NOTE — CARE COORDINATION
Chart reviewed. CM spoke with nurse and patient needs a midline for IV antx. She will set up transport when everything is ready. Wil at [facility] updated that patient may come later today. Pt will need a rapid COVID and CRISTINA needs to be completed by RN and doctor at discharge. If pt is discharged after hours please complete the following. ... Call report to [phone]   Fax completed AVS with both CRISTINA on the AVS and any written Rx [phone]. Set up transportation [facility] [phone] or [facility] [phone] and call family.

## 2022-06-04 ENCOUNTER — HOSPITAL ENCOUNTER (OUTPATIENT)
Age: 87
Setting detail: SPECIMEN
Discharge: HOME | End: 2022-06-04

## 2022-06-04 LAB
ANION GAP SERPL CALCULATED.3IONS-SCNC: 11 MMOL/L (ref 4–16)
BUN BLDV-MCNC: 19 MG/DL (ref 6–23)
CALCIUM SERPL-MCNC: 8.6 MG/DL (ref 8.3–10.6)
CHLORIDE BLD-SCNC: 106 MMOL/L (ref 99–110)
CO2: 21 MMOL/L (ref 21–32)
CREAT SERPL-MCNC: 1.2 MG/DL (ref 0.6–1.1)
GFR AFRICAN AMERICAN: 51 ML/MIN/1.73M2
GFR NON-AFRICAN AMERICAN: 42 ML/MIN/1.73M2
GLUCOSE BLD-MCNC: 233 MG/DL (ref 70–99)
POTASSIUM SERPL-SCNC: 4.8 MMOL/L (ref 3.5–5.1)
SODIUM BLD-SCNC: 138 MMOL/L (ref 135–145)

## 2022-06-04 PROCEDURE — 80048 BASIC METABOLIC PNL TOTAL CA: CPT

## 2022-06-04 PROCEDURE — 36415 COLL VENOUS BLD VENIPUNCTURE: CPT

## 2022-06-07 ENCOUNTER — HOSPITAL ENCOUNTER (OUTPATIENT)
Age: 87
Setting detail: SPECIMEN
Discharge: HOME | End: 2022-06-07

## 2022-06-07 LAB
ALBUMIN SERPL-MCNC: 3.6 GM/DL (ref 3.4–5)
ALP BLD-CCNC: 83 IU/L (ref 40–128)
ALT SERPL-CCNC: 11 U/L (ref 10–40)
ANION GAP SERPL CALCULATED.3IONS-SCNC: 12 MMOL/L (ref 4–16)
AST SERPL-CCNC: 13 IU/L (ref 15–37)
BASOPHILS ABSOLUTE: 0.1 K/CU MM
BASOPHILS RELATIVE PERCENT: 0.9 % (ref 0–1)
BILIRUB SERPL-MCNC: 0.4 MG/DL (ref 0–1)
BUN BLDV-MCNC: 20 MG/DL (ref 6–23)
C-REACTIVE PROTEIN, HIGH SENSITIVITY: 3.8 MG/L
CALCIUM SERPL-MCNC: 8.8 MG/DL (ref 8.3–10.6)
CHLORIDE BLD-SCNC: 107 MMOL/L (ref 99–110)
CO2: 22 MMOL/L (ref 21–32)
CREAT SERPL-MCNC: 0.9 MG/DL (ref 0.6–1.1)
DIFFERENTIAL TYPE: ABNORMAL
EOSINOPHILS ABSOLUTE: 0.2 K/CU MM
EOSINOPHILS RELATIVE PERCENT: 3.1 % (ref 0–3)
ERYTHROCYTE SEDIMENTATION RATE: 6 MM/HR (ref 0–30)
GFR AFRICAN AMERICAN: >60 ML/MIN/1.73M2
GFR NON-AFRICAN AMERICAN: 58 ML/MIN/1.73M2
GLUCOSE BLD-MCNC: 134 MG/DL (ref 70–99)
HCT VFR BLD CALC: 46.5 % (ref 37–47)
HEMOGLOBIN: 14 GM/DL (ref 12.5–16)
IMMATURE NEUTROPHIL %: 0.5 % (ref 0–0.43)
LYMPHOCYTES ABSOLUTE: 1.2 K/CU MM
LYMPHOCYTES RELATIVE PERCENT: 15.4 % (ref 24–44)
MCH RBC QN AUTO: 28.8 PG (ref 27–31)
MCHC RBC AUTO-ENTMCNC: 30.1 % (ref 32–36)
MCV RBC AUTO: 95.7 FL (ref 78–100)
MONOCYTES ABSOLUTE: 0.5 K/CU MM
MONOCYTES RELATIVE PERCENT: 6.3 % (ref 0–4)
NUCLEATED RBC %: 0 %
PDW BLD-RTO: 14.3 % (ref 11.7–14.9)
PLATELET # BLD: 245 K/CU MM (ref 140–440)
PMV BLD AUTO: 11 FL (ref 7.5–11.1)
POTASSIUM SERPL-SCNC: 4.5 MMOL/L (ref 3.5–5.1)
RBC # BLD: 4.86 M/CU MM (ref 4.2–5.4)
SEGMENTED NEUTROPHILS ABSOLUTE COUNT: 5.5 K/CU MM
SEGMENTED NEUTROPHILS RELATIVE PERCENT: 73.8 % (ref 36–66)
SODIUM BLD-SCNC: 141 MMOL/L (ref 135–145)
TOTAL IMMATURE NEUTOROPHIL: 0.04 K/CU MM
TOTAL NUCLEATED RBC: 0 K/CU MM
TOTAL PROTEIN: 6.3 GM/DL (ref 6.4–8.2)
WBC # BLD: 7.5 K/CU MM (ref 4–10.5)

## 2022-06-07 PROCEDURE — 36415 COLL VENOUS BLD VENIPUNCTURE: CPT

## 2022-06-07 PROCEDURE — 85652 RBC SED RATE AUTOMATED: CPT

## 2022-06-07 PROCEDURE — 85025 COMPLETE CBC W/AUTO DIFF WBC: CPT

## 2022-06-07 PROCEDURE — 80053 COMPREHEN METABOLIC PANEL: CPT

## 2022-06-07 PROCEDURE — 86480 TB TEST CELL IMMUN MEASURE: CPT

## 2022-06-07 PROCEDURE — 86140 C-REACTIVE PROTEIN: CPT

## 2022-06-09 LAB
QUANTI TB1 MINUS NIL: 0 IU/ML (ref 0–0.34)
QUANTI TB2 MINUS NIL: 0 IU/ML (ref 0–0.34)
QUANTIFERON (R) TB GOLD (INCUBATED): NEGATIVE IU/ML
QUANTIFERON MITOGEN MINUS NIL: >10 IU/ML
QUANTIFERON NIL: 0.01 IU/ML

## 2022-06-10 ENCOUNTER — HOSPITAL ENCOUNTER (OUTPATIENT)
Age: 87
Setting detail: SPECIMEN
Discharge: HOME | End: 2022-06-10

## 2022-06-10 LAB
ANION GAP SERPL CALCULATED.3IONS-SCNC: 10 MMOL/L (ref 4–16)
BUN BLDV-MCNC: 68 MG/DL (ref 6–23)
CALCIUM SERPL-MCNC: 8.5 MG/DL (ref 8.3–10.6)
CHLORIDE BLD-SCNC: 105 MMOL/L (ref 99–110)
CO2: 26 MMOL/L (ref 21–32)
CREAT SERPL-MCNC: 0.8 MG/DL (ref 0.6–1.1)
GFR AFRICAN AMERICAN: >60 ML/MIN/1.73M2
GFR NON-AFRICAN AMERICAN: >60 ML/MIN/1.73M2
GLUCOSE BLD-MCNC: 125 MG/DL (ref 70–99)
POTASSIUM SERPL-SCNC: 5 MMOL/L (ref 3.5–5.1)
SODIUM BLD-SCNC: 141 MMOL/L (ref 135–145)

## 2022-06-10 PROCEDURE — 36415 COLL VENOUS BLD VENIPUNCTURE: CPT

## 2022-06-10 PROCEDURE — 80048 BASIC METABOLIC PNL TOTAL CA: CPT

## 2022-06-13 NOTE — DISCHARGE SUMMARY
Discharge Summary    Name:  Sugar Ang /Age/Sex: 1927 (80 y.o. female)   Admit Date: 2022  Discharge Date: 6/3/22    MRN & CSN:  [id number] & [id number] Encounter Date and Time 6/3/22 12:30 PM EDT    Attending:  Dr. Vanessa Reddy Discharging Provider: Sedalia Osler, MD       Hospital Course:     Brief HPI: Sugar Ang is a 80 y.o. female who presented to ED from [facility] after she was found in the bathroom on the tile floor. She was brought to the ED where she was found to have an altered mental status, and was admitted. While here she was found to have sepsis due to E. coli bacteremia and Klebsiella pneumoniae bacteremia. Urine culture was positive for E. coli as well. This was treated appropriately. Appreciate infectious disease consult. Her recovery was complicated by an acute kidney injury, which has now improved. Travon Baker was not eating well the last few days here in the hospital.  I called her relative Colton Bajwa who is her second cousin and decision maker and spoke to him over the phone. He indicated that she has a good quality of life. She was living independently at [facility]. The only thing they helped with was meals. She moved into independent living about 3-1/2 years ago. At this time we will continue to try to get her over this episode. Due to poor oral intake I ordered IV fluids- 500 mL normal saline every other day x5 doses at the facility after discharge. Suyapa Norman     She was here in the hospital for 11 days, initially [employer] denied the skilled nursing stay, but then approved it upon appeal.  She was dismissed to the skilled nursing section of [facility]    Problem Based Course:     Sepsis secondary to E. coli and Klebsiella pneumonia bacteremia  -It with IV cefepime while here and then switched to Bactrim, but then creatinine went up while on Bactrim  -She was put back on cefepime, and has been discharged with cefepime 1000 mg IV every 12 hours, with a stop date of June 8. Also, she was not taking p.o. medications well, thus the IV medicine. Urinary tract infection due to E. coli  -CT abdomen did not show any acute renal findings    Acute kidney injury noted on hospital day 9  -Creatinine was 0.8 upon admission on May 23, but increased to 2.2 on June 1. At that point lisinopril and Bactrim were both held, and IV fluids were started. Creatinine improved to 1.5 upon discharge today on Christina 3.  -Lisinopril held  -Bactrim held  -IV fluids started  -Recommend electrolyte monitoring as an outpatient    Toxic metabolic encephalopathy  -This has complicated her recovery. She has poor oral intake. Appreciate neurology consult. CODE STATUS: No intubation, no defibrillation, and no CPR per her healthcare decision maker Berenice Bullock who goes by Nam Butter. He is her second cousin. Other problems    Moderate aortic stenosis-seen on echo this admission   -Cardiology consult appreciated- follow-up with cardiology as needed    AAA-4.3 cm on CT abdomen done while here- recommend vascular consult as an outpatient, and follow-up imaging every 12 months    Fall at assisted living facility  -Likely related to infection-sent to SNF for PT/OT    Chronic left lower extremity DVT Per PCP records-on Xarelto-her dose has been lowered to 15 mg daily because her creatinine clearance is calculated to be less than 50 at the time of discharge    Mild CK elevation- does not meet the criteria for rhabdomyolysis.   Initial CK was 499, and repeat is 151    Mild cognitive disorder- this entry noted in PCPs records as of September 2020-continue donepezil    History of recurrent falls  -Consider risk and benefit of continuing Xarelto as an outpatient    Hypertension  -Lisinopril was changed to hydralazine for the time being because of the GABRIELE    Hyperlipidemia-continue pravastatin    Former smoker-quit in 2019 when she moved into assisted living  BMI is 22.8    Severe lumbar spinal stenosis noted at L5-S1, L4-5, and L3-4-radiologist recommends an MRI    8 mm right upper lobe subpleural pulmonary nodule-seen on CT of the thoracic spine-radiologist recommended a follow-up CT in 1 year. Later in this hospital stay, she had a CT chest however, which did not show suspicious lesions. Recommend outpatient follow-up. The patient expressed appropriate understanding of, and agreement with the discharge recommendations, medications, and plan. Consults this admission:  IP CONSULT TO CASE MANAGEMENT  IP CONSULT TO HOSPITALIST  IP CONSULT TO CARDIOLOGY  IP CONSULT TO NEUROLOGY  PHARMACY TO DOSE VANCOMYCIN  IP CONSULT TO INFECTIOUS DISEASES  IP CONSULT TO NEPHROLOGY  IP CONSULT TO IV TEAM  IP CONSULT TO IV TEAM    Discharge Diagnosis:   Sepsis    Discharge Instruction:   Follow up appointments: nephrology, can also follow up with neurology and cardiology if needed  Primary care physician: Jalen Porter MD within 2 weeks  Diet: regular diet   Activity: activity as tolerated  Disposition: Discharged to:   SNF  Condition on discharge: Stable  Labs and Tests to be Followed up as an outpatient by PCP or Specialist: Right upper lobe pulmonary nodule-8 mm- recommend noncontrast CT chest in 6 to 12 months per radiologist-see full report below  -Also, 4.3 cm AAA seen on CT-recommend vascular referral    Discharge Medications:        Medication List      START taking these medications    bisacodyl 10 MG suppository  Commonly known as: DULCOLAX  Place 1 suppository rectally daily as needed for Constipation     clotrimazole-betamethasone 1-0.05 % cream  Commonly known as: LOTRISONE  Apply topically 2 times daily. hydrALAZINE 25 MG tablet  Commonly known as: APRESOLINE  Take 1 tablet by mouth every 12 hours Hold if SBP is less than 120     miconazole 2 % powder  Commonly known as: MICOTIN  Apply topically 2 times daily.      polyethylene glycol 17 g packet  Commonly known as: GLYCOLAX  Take 17 g by CT of the head was performed without the administration of intravenous   contrast. Automated exposure control, iterative reconstruction, and/or weight   based adjustment of the mA/kV was utilized to reduce the radiation dose to as   low as reasonably achievable. COMPARISON:   CT of the head of 05/23/2022     HISTORY:   ORDERING SYSTEM PROVIDED HISTORY: Confusion   TECHNOLOGIST PROVIDED HISTORY:   Reason for exam:->Confusion   Has a \"code stroke\" or \"stroke alert\" been called? ->No   Reason for Exam: Confusion     FINDINGS:   BRAIN/VENTRICLES: There is no acute intracranial hemorrhage, mass effect or   midline shift. No abnormal extra-axial fluid collection. No CT evidence of   acute, large territory infarct. Confluent areas of hypoattenuation in the   subcortical and periventricular white matter are consistent with chronic   small vessel ischemic disease. Age related cortical atrophy with associated   sulcal and ventricular prominence. There is no evidence of hydrocephalus. Ventricles are unchanged in size and configuration in comparison to 05/23/2022     ORBITS: The visualized portion of the orbits demonstrate no acute abnormality. SINUSES: The visualized paranasal sinuses and mastoid air cells demonstrate   no acute abnormality. SOFT TISSUES/SKULL:  Anterior opacification of the paranasal sinuses without   air-fluid level. The bilateral mastoid air cells and middle ears are clear. Impression:       No acute intracranial abnormality.   No significant interval change in   comparison to 05/23/2022      VL DUP UPPER EXTREMITY VENOUS LEFT [[id number]] Collected: 06/02/22 1737     Order Status: Completed Updated: 06/02/22 4253     Narrative:       EXAMINATION:   DUPLEX ULTRASOUND OF THE LEFT UPPER EXTREMITY FOR DVT, 6/2/2022 2:52 pm     TECHNIQUE:   Duplex ultrasound using B-mode/gray scaled imaging and Doppler spectral   analysis and color flow was obtained of the deep venous structures of the upper left extremity. COMPARISON:   None. HISTORY:   ORDERING SYSTEM PROVIDED HISTORY: Edema, rule out DVT   TECHNOLOGIST PROVIDED HISTORY:   Reason for exam:->Edema, rule out DVT     FINDINGS:   There is normal flow and compressibility of the visualized venous structures. There is no evidence of echogenic thrombus. The veins demonstrate good   compressibility with normal color flow study and spectral analysis. Likely   cephalic vein occlusion. Impression:       No evidence of DVT. Likely cephalic vein occlusion. CT ABDOMEN PELVIS WO CONTRAST Additional Contrast? None [[id number]] Collected: 06/02/22 1510     Order Status: Completed Updated: 06/02/22 1533     Narrative:       EXAMINATION:   CT OF THE CHEST WITHOUT CONTRAST; CT OF THE ABDOMEN AND PELVIS WITHOUT   CONTRAST 6/2/2022 11:17 am; 6/2/2022 11:18 am     TECHNIQUE:   CT of the chest was performed without the administration of intravenous   contrast. Multiplanar reformatted images are provided for review. Automated   exposure control, iterative reconstruction, and/or weight based adjustment of   the mA/kV was utilized to reduce the radiation dose to as low as reasonably   achievable.; CT of the abdomen and pelvis was performed without the   administration of intravenous contrast. Multiplanar reformatted images are   provided for review. Automated exposure control, iterative reconstruction,   and/or weight based adjustment of the mA/kV was utilized to reduce the   radiation dose to as low as reasonably achievable.      COMPARISON:   Abdomen pelvis CT, 01/07/2021     HISTORY:   ORDERING SYSTEM PROVIDED HISTORY: Question of lung mass on cxr   TECHNOLOGIST PROVIDED HISTORY:   Reason for exam:->Question of lung mass on cxr   Reason for Exam: Question of lung mass on cxr; ORDERING SYSTEM PROVIDED   HISTORY: Poor appetite   TECHNOLOGIST PROVIDED HISTORY:   Reason for exam:->Poor appetite   Additional Contrast?->None   Reason for Exam: Poor appetite     FINDINGS:     Chest:     Mediastinum: Visualized thyroid unremarkable. No lymphadenopathy. Esophagus   unremarkable. Limited noncontrast imaging of the cardiac chambers, thoracic aorta, and   pulmonary arteries is unremarkable. Lungs/pleura: The focal opacity seen on the chest radiograph is secondary to   a calcified nodule which measures 1.8 cm (series 3, image 53). Overall, no   suspicious lung lesions are identified. Mild dependent atelectasis noted   bilaterally. No acute infiltrate is seen. Airways are patent. Soft Tissues/Bones: No acute or suspicious bony abnormalities are identified. Visualized extra thoracic soft tissues unremarkable. Abdomen/Pelvis:     Lack of intravenous contrast limits evaluation of the solid abdominal   viscera, the hollow abdominal viscera, and the vascular structures. In   addition, respiratory motion artifact further degrades imaging, especially   within the upper abdomen. Organs: With that said, no acute or suspicious hepatic abnormality. Gallbladder is not visualized. Noncontrast imaging of the spleen, adrenals,   and pancreas unremarkable. No acute or suspicious renal abnormalities are   identified. GI/Bowel: Extensive diverticulosis of the large bowel is present, especially   in the region of the sigmoid colon. No CT evidence of diverticulitis is   identified. The appendix is normal.     Moderate-sized hiatal hernia noted. Otherwise, the stomach, duodenal sweep,   and the remainder of the small bowel are unremarkable in appearance. Pelvis: Uterus and adnexa regions unremarkable. Urinary bladder   unremarkable. No free pelvic fluid is found. Peritoneum/Retroperitoneum: Abdominal aortic aneurysm is present, measuring   4.3 x 3.7 cm maximally (previously 4.1 x 3.4 cm in January 2021). No   evidence of leakage is seen. No upper abdominal, retroperitoneal, iliac chain, or inguinal lymphadenopathy.      Bones/Soft Tissues: No acute or suspicious bony abnormalities are identified. Multilevel degenerative disc and facet disease is noted which leads to at   least moderate if not high-grade central canal stenosis. The extra-abdominal   and extra pelvic soft tissues are unremarkable. Impression:       Chest CT:     The lesion seen on the radiograph is compatible with a benign calcified   granuloma, which requires no specific follow-up. Overall, no suspicious lung   abnormality detected. No acute abnormality detected within the chest.     Abdomen and pelvis CT:     No abnormality identified to explain poor appetite. Abdominal aortic aneurysm measuring 4.3 cm maximally, previously 4.1 cm. See   recommendations below. Extensive diverticulosis of the large bowel, especially the region of the   sigmoid colon, but without convincing CT evidence of diverticulitis. Moderate hiatal hernia. RECOMMENDATIONS:   For management of fusiform AAA:     4.0-4.4 cm AAA, recommend follow-up every 12 months and recommend vascular   consultation. References: [employer] Radiol 2013; 22(93):310-074; J Vasc Surg. 2018; 67:2-77      CT CHEST WO CONTRAST [[id number]] Collected: 06/02/22 1510     Order Status: Completed Updated: 06/02/22 1533     Narrative:       EXAMINATION:   CT OF THE CHEST WITHOUT CONTRAST; CT OF THE ABDOMEN AND PELVIS WITHOUT   CONTRAST 6/2/2022 11:17 am; 6/2/2022 11:18 am     TECHNIQUE:   CT of the chest was performed without the administration of intravenous   contrast. Multiplanar reformatted images are provided for review. Automated   exposure control, iterative reconstruction, and/or weight based adjustment of   the mA/kV was utilized to reduce the radiation dose to as low as reasonably   achievable.; CT of the abdomen and pelvis was performed without the   administration of intravenous contrast. Multiplanar reformatted images are   provided for review.  Automated exposure control, iterative reconstruction,   and/or weight based adjustment of the mA/kV was utilized to reduce the   radiation dose to as low as reasonably achievable. COMPARISON:   Abdomen pelvis CT, 01/07/2021     HISTORY:   ORDERING SYSTEM PROVIDED HISTORY: Question of lung mass on cxr   TECHNOLOGIST PROVIDED HISTORY:   Reason for exam:->Question of lung mass on cxr   Reason for Exam: Question of lung mass on cxr; ORDERING SYSTEM PROVIDED   HISTORY: Poor appetite   TECHNOLOGIST PROVIDED HISTORY:   Reason for exam:->Poor appetite   Additional Contrast?->None   Reason for Exam: Poor appetite     FINDINGS:     Chest:     Mediastinum: Visualized thyroid unremarkable. No lymphadenopathy. Esophagus   unremarkable. Limited noncontrast imaging of the cardiac chambers, thoracic aorta, and   pulmonary arteries is unremarkable. Lungs/pleura: The focal opacity seen on the chest radiograph is secondary to   a calcified nodule which measures 1.8 cm (series 3, image 53). Overall, no   suspicious lung lesions are identified. Mild dependent atelectasis noted   bilaterally. No acute infiltrate is seen. Airways are patent. Soft Tissues/Bones: No acute or suspicious bony abnormalities are identified. Visualized extra thoracic soft tissues unremarkable. Abdomen/Pelvis:     Lack of intravenous contrast limits evaluation of the solid abdominal   viscera, the hollow abdominal viscera, and the vascular structures. In   addition, respiratory motion artifact further degrades imaging, especially   within the upper abdomen. Organs: With that said, no acute or suspicious hepatic abnormality. Gallbladder is not visualized. Noncontrast imaging of the spleen, adrenals,   and pancreas unremarkable. No acute or suspicious renal abnormalities are   identified. GI/Bowel: Extensive diverticulosis of the large bowel is present, especially   in the region of the sigmoid colon. No CT evidence of diverticulitis is   identified.   The appendix is normal.     Moderate-sized hiatal hernia noted. Otherwise, the stomach, duodenal sweep,   and the remainder of the small bowel are unremarkable in appearance. Pelvis: Uterus and adnexa regions unremarkable. Urinary bladder   unremarkable. No free pelvic fluid is found. Peritoneum/Retroperitoneum: Abdominal aortic aneurysm is present, measuring   4.3 x 3.7 cm maximally (previously 4.1 x 3.4 cm in January 2021). No   evidence of leakage is seen. No upper abdominal, retroperitoneal, iliac chain, or inguinal lymphadenopathy. Bones/Soft Tissues: No acute or suspicious bony abnormalities are identified. Multilevel degenerative disc and facet disease is noted which leads to at   least moderate if not high-grade central canal stenosis. The extra-abdominal   and extra pelvic soft tissues are unremarkable. Impression:       Chest CT:     The lesion seen on the radiograph is compatible with a benign calcified   granuloma, which requires no specific follow-up. Overall, no suspicious lung   abnormality detected. No acute abnormality detected within the chest.     Abdomen and pelvis CT:     No abnormality identified to explain poor appetite. Abdominal aortic aneurysm measuring 4.3 cm maximally, previously 4.1 cm. See   recommendations below. Extensive diverticulosis of the large bowel, especially the region of the   sigmoid colon, but without convincing CT evidence of diverticulitis. Moderate hiatal hernia. RECOMMENDATIONS:   For management of fusiform AAA:     4.0-4.4 cm AAA, recommend follow-up every 12 months and recommend vascular   consultation. References: [employer] Radiol 2013; 59(16):376-189; J Vasc Surg.  2018; 67:2-77      MRI BRAIN [address] [[id number]] Collected: 05/23/22 2212     Order Status: Completed Updated: 05/23/22 2230     Narrative:       EXAMINATION:   MRI OF THE BRAIN WITHOUT CONTRAST  5/23/2022 9:50 pm     TECHNIQUE:   Multiplanar multisequence MRI of the brain was performed without the   administration of intravenous contrast.     COMPARISON:   None. HISTORY:   ORDERING SYSTEM PROVIDED HISTORY: FALL CONFUSION   TECHNOLOGIST PROVIDED HISTORY:   Reason for exam:->FALL CONFUSION   What is the sedation requirement?->None   Decision Support Exception - unselect if not a suspected or confirmed   emergency medical condition->Emergency Medical Condition (MA)   Reason for Exam: fall     FINDINGS:   INTRACRANIAL STRUCTURES/VENTRICLES: There is no acute infarct. No mass effect   or midline shift. No evidence of an acute intracranial hemorrhage. The   ventricles and sulci are noted for marked diffuse cerebral atrophy and   moderate chronic white matter ischemic change. An old lacunar infarct is   noted in the superior mid right frontal lobe. The sellar/suprasellar regions   appear unremarkable. The normal signal voids within the major intracranial   vessels appear maintained. ORBITS: The visualized portion of the orbits demonstrate no acute abnormality. SINUSES: The paranasal sinuses and mastoids are noted for minor patchy   mucosal thickening in the bilateral ethmoid sinuses and minor mucoperiosteal   thickening in the left mastoid. BONES/SOFT TISSUES: The bone marrow signal intensity appears normal. The soft   tissues demonstrate no acute abnormality. Impression:       No acute intracranial abnormality. Minor sinus mucosal disease and minor left mastoid mucoperiosteal thickening   of doubtful clinical significance. VL DUP CAROTID BILATERAL [[id number]] Collected: 05/23/22 1944     Order Status: Completed Updated: 05/23/22 1948     Narrative:       EXAMINATION:   ULTRASOUND EVALUATION OF THE CAROTID ARTERIES     5/23/2022     TECHNIQUE:   Duplex ultrasound using B-mode/gray scaled imaging, Doppler spectral analysis   and color flow Doppler was obtained of the carotid arteries. COMPARISON:   None.      HISTORY: ORDERING SYSTEM PROVIDED HISTORY: FALL POSSIBLE SYNCOPE   TECHNOLOGIST PROVIDED HISTORY:   Reason for exam:->FALL POSSIBLE SYNCOPE     FINDINGS:     RIGHT:     The right common carotid artery demonstrates peak systolic velocities of 76   and 72 cm/sec in the proximal and distal segments respectively. The right internal carotid artery demonstrates the systolic velocities of 41,   49, and 74 cm/sec in the proximal, mid and distal segments respectively. The external carotid artery is patent. The vertebral artery demonstrates   normal antegrade flow. There is atherosclerotic plaque in the distal common carotid arteries   extending into the carotid bifurcation and bulb     ICA/CCA ratio of 1.0       LEFT:     The left common carotid artery demonstrates peak systolic velocities of 48   and 65 cm/sec in the proximal and distal segments respectively. The left internal carotid artery demonstrates the systolic velocities of 52,   51, and 47 cm/sec in the proximal, mid and distal segments respectively. The external carotid artery is patent. The vertebral artery demonstrates   normal antegrade flow. There is atherosclerotic plaque in the distal common carotid arteries   extending into the internal carotid artery     ICA/CCA ratio of 0.8      Impression:       The right internal carotid artery demonstrates 0-50% stenosis. The left internal carotid artery demonstrates 0-50% stenosis. Bilateral vertebral arteries are patent with flow in the normal direction. XR PELVIS (1-2 VIEWS) [[id number]] Collected: 05/23/22 1223     Order Status: Completed Updated: 05/23/22 1227     Narrative:       EXAMINATION:   ONE XRAY VIEW OF THE PELVIS     5/23/2022 11:36 am     COMPARISON:   None.      HISTORY:   ORDERING SYSTEM PROVIDED HISTORY: trauma   TECHNOLOGIST PROVIDED HISTORY:   Reason for exam:->trauma   Reason for Exam: trauma   Additional signs and symptoms: trauma   Relevant Medical/Surgical History: trauma     FINDINGS:   Single AP view the pelvis reveals SI joints symmetric without widening. No   displaced pelvic ring fracture evident. Mild-to-moderate degenerative   changes of the bilateral hips which appear well located without acute fracture      Impression:       No acute traumatic findings of the pelvis identified      CT THORACIC SPINE WO CONTRAST [[id number]] Collected: 05/23/22 1219     Order Status: Completed Updated: 05/23/22 1227     Narrative:       EXAMINATION:   CT OF THE THORACIC SPINE WITHOUT CONTRAST  5/23/2022 11:57 am:     TECHNIQUE:   CT of the thoracic spine was performed without the administration of   intravenous contrast. Multiplanar reformatted images are provided for review. Automated exposure control, iterative reconstruction, and/or weight based   adjustment of the mA/kV was utilized to reduce the radiation dose to as low   as reasonably achievable. COMPARISON:   None. HISTORY:   ORDERING SYSTEM PROVIDED HISTORY: fall   TECHNOLOGIST PROVIDED HISTORY:   CT  T-Spine w/o Contrast   Reason for exam:->fall   Reason for Exam: FALL     FINDINGS:   BONES/ALIGNMENT: There is normal alignment of the spine. The vertebral body   heights are maintained. No osseous destructive lesion is seen. DEGENERATIVE CHANGES: Mild-to-moderate multilevel degenerative changes. Ankylosis of T12-L1. No gross spinal canal stenosis or bony neural foraminal   narrowing of the thoracic spine. SOFT TISSUES: No paraspinal mass. There is a 10 x 6 mm right upper lobe   subpleural pulmonary nodule. There are coronary artery calcifications as   well as aortic leaflet and mitral valvular calcifications. Moderate hiatal   hernia. Impression:       No acute thoracic spine trauma. 8 mm average diameter right upper lobe subpleural pulmonary nodule. Follow-up as clinically warranted according to the Fleischner criteria below.      RECOMMENDATIONS:   8 mm right solid pulmonary nodule within the upper lobe. Recommend a   non-contrast Chest CT at 6-12 months. If patient is high risk for malignancy,   recommend an additional non-contrast Chest CT at 18-24 months; if patient is   low risk for malignancy a non-contrast Chest CT at 18-24 months is optional.   These guidelines do not apply to immunocompromised patients and patients with   cancer. Follow up in patients with significant comorbidities as clinically   warranted. For lung cancer screening, adhere to Lung-RADS guidelines. Reference: Radiology. 2017; 284(1):228-43. XR CHEST PORTABLE [[id number]] Collected: 05/23/22 1222     Order Status: Completed Updated: 05/23/22 1226     Narrative:       EXAMINATION:   ONE XRAY VIEW OF THE CHEST     5/23/2022 11:36 am     COMPARISON:   None. HISTORY:   ORDERING SYSTEM PROVIDED HISTORY: fall   TECHNOLOGIST PROVIDED HISTORY:   Reason for exam:->fall   Reason for Exam: fall   Additional signs and symptoms: fall   Relevant Medical/Surgical History: fall     FINDINGS:   Cardiac size borderline enlarged. No overt edema. No pneumothorax or   pleural effusion. 2.2 cm nodular focus overlies the right mid lung   concerning for pulmonary nodule. Impression:       No acute traumatic findings of the chest     2.2 cm right mid lung rounded nodular focus with attention on CT chest   recommended for further evaluation to exclude underlying malignancy      CT LUMBAR SPINE WO CONTRAST [[id number]] Collected: 05/23/22 1217     Order Status: Completed Updated: 05/23/22 1222     Narrative:       EXAMINATION:   CT OF THE LUMBAR SPINE WITHOUT CONTRAST  5/23/2022     TECHNIQUE:   CT of the lumbar spine was performed without the administration of   intravenous contrast. Multiplanar reformatted images are provided for review. Adjustment of mA and/or kV according to patient size was utilized.   Automated   exposure control, iterative reconstruction, and/or weight based adjustment of   the mA/kV was utilized to reduce the radiation dose to as low as reasonably   achievable. 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 Moderate loss of vertebral heights   throughout. There is grade 1 retrolisthesis of L2 on L3, L3 on L4 with grade   1 anterolisthesis of L4 on L5. No fracture is identified. There is severe   loss of disc height at T12-L1, L1-2 and L4-5 with severe endplate   degenerative changes and facet hypertrophy bilaterally at these levels     There is circumferential bulging of the disc with disc osteophyte complex   producing severe canal and foraminal stenosis L5-S1, L4-5, L3-4 with moderate   canal and foraminal stenosis at L2-3 and L1-2. Impression:       No acute fracture. Severe degenerative changes. MRI suggested. CT CERVICAL SPINE WO CONTRAST [[id number]] Collected: 05/23/22 1214     Order Status: Completed Updated: 05/23/22 1221     Narrative:       EXAMINATION:   CT OF THE CERVICAL SPINE WITHOUT CONTRAST 5/23/2022 11:56 am     TECHNIQUE:   CT of the cervical spine was performed without the administration of   intravenous contrast. Multiplanar reformatted images are provided for review. Automated exposure control, iterative reconstruction, and/or weight based   adjustment of the mA/kV was utilized to reduce the radiation dose to as low   as reasonably achievable. COMPARISON:   None.      HISTORY:   ORDERING SYSTEM PROVIDED HISTORY: fall   TECHNOLOGIST PROVIDED HISTORY:   Reason for exam:->fall   Decision Support Exception - unselect if not a suspected or confirmed   emergency medical condition->Emergency Medical Condition (MA)   Reason for Exam: FALL     FINDINGS:   BONES/ALIGNMENT: There is no acute fracture or traumatic malalignment. DEGENERATIVE CHANGES: Moderate disc degenerative changes at C4-5, C5-6 and   C6-7. SOFT TISSUES: There is no prevertebral soft tissue swelling. Impression:       No acute abnormality of the cervical spine. CT HEAD WO CONTRAST [[id number]] Collected: 05/23/22 1213     Order Status: Completed Updated: 05/23/22 1218     Narrative:       EXAMINATION:   CT OF THE HEAD WITHOUT CONTRAST  5/23/2022 11:56 am     TECHNIQUE:   CT of the head was performed without the administration of intravenous   contrast. Automated exposure control, iterative reconstruction, and/or weight   based adjustment of the mA/kV was utilized to reduce the radiation dose to as   low as reasonably achievable. COMPARISON:   None. HISTORY:   ORDERING SYSTEM PROVIDED HISTORY: HEAD TRAUMA, CLOSED, MILD, GCS >= 13, NO   RISK FACTORS, NEURO EXAM NORMAL   TECHNOLOGIST PROVIDED HISTORY:   Has a \"code stroke\" or \"stroke alert\" been called? 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 No   abnormal extra-axial fluid collection. The gray-white differentiation is   maintained without evidence of an acute infarct. There is no evidence of   hydrocephalus. ORBITS: The visualized portion of the orbits demonstrate no acute abnormality. SINUSES: The visualized paranasal sinuses and mastoid air cells demonstrate   no acute abnormality.      SOFT TISSUES/SKULL:  No acute abnormality of the visualized skull or soft   tissues. Impression:       No acute intracranial abnormality.           Time Spent Discharging patient 50 minutes    Electronically signed by Franklin Padilla MD on 6/13/2022 at 10:26 AM

## 2022-06-28 ENCOUNTER — HOSPITAL ENCOUNTER (OUTPATIENT)
Age: 87
Setting detail: SPECIMEN
Discharge: HOME | End: 2022-06-28

## 2022-06-28 LAB
ANION GAP SERPL CALCULATED.3IONS-SCNC: 12 MMOL/L (ref 4–16)
BUN BLDV-MCNC: 11 MG/DL (ref 6–23)
CALCIUM SERPL-MCNC: 8.9 MG/DL (ref 8.3–10.6)
CHLORIDE BLD-SCNC: 108 MMOL/L (ref 99–110)
CO2: 23 MMOL/L (ref 21–32)
CREAT SERPL-MCNC: 0.8 MG/DL (ref 0.6–1.1)
GFR AFRICAN AMERICAN: >60 ML/MIN/1.73M2
GFR NON-AFRICAN AMERICAN: >60 ML/MIN/1.73M2
GLUCOSE BLD-MCNC: 94 MG/DL (ref 70–99)
HCT VFR BLD CALC: 41.5 % (ref 37–47)
HEMOGLOBIN: 12.4 GM/DL (ref 12.5–16)
MCH RBC QN AUTO: 30.5 PG (ref 27–31)
MCHC RBC AUTO-ENTMCNC: 29.9 % (ref 32–36)
MCV RBC AUTO: 102.2 FL (ref 78–100)
PDW BLD-RTO: 16.8 % (ref 11.7–14.9)
PLATELET # BLD: 310 K/CU MM (ref 140–440)
PMV BLD AUTO: 10.8 FL (ref 7.5–11.1)
POTASSIUM SERPL-SCNC: 4.7 MMOL/L (ref 3.5–5.1)
RBC # BLD: 4.06 M/CU MM (ref 4.2–5.4)
SODIUM BLD-SCNC: 143 MMOL/L (ref 135–145)
WBC # BLD: 6 K/CU MM (ref 4–10.5)

## 2022-06-28 PROCEDURE — 36415 COLL VENOUS BLD VENIPUNCTURE: CPT

## 2022-06-28 PROCEDURE — 85027 COMPLETE CBC AUTOMATED: CPT

## 2022-06-28 PROCEDURE — 80048 BASIC METABOLIC PNL TOTAL CA: CPT

## 2022-07-05 ENCOUNTER — HOSPITAL ENCOUNTER (OUTPATIENT)
Age: 87
Setting detail: SPECIMEN
Discharge: HOME | End: 2022-07-05

## 2022-07-05 LAB
ANION GAP SERPL CALCULATED.3IONS-SCNC: 9 MMOL/L (ref 4–16)
BUN BLDV-MCNC: 18 MG/DL (ref 6–23)
CALCIUM SERPL-MCNC: 8.9 MG/DL (ref 8.3–10.6)
CHLORIDE BLD-SCNC: 109 MMOL/L (ref 99–110)
CO2: 26 MMOL/L (ref 21–32)
CREAT SERPL-MCNC: 0.8 MG/DL (ref 0.6–1.1)
GFR AFRICAN AMERICAN: >60 ML/MIN/1.73M2
GFR NON-AFRICAN AMERICAN: >60 ML/MIN/1.73M2
GLUCOSE BLD-MCNC: 90 MG/DL (ref 70–99)
HCT VFR BLD CALC: 40.6 % (ref 37–47)
HEMOGLOBIN: 12.1 GM/DL (ref 12.5–16)
MCH RBC QN AUTO: 30 PG (ref 27–31)
MCHC RBC AUTO-ENTMCNC: 29.8 % (ref 32–36)
MCV RBC AUTO: 100.7 FL (ref 78–100)
PDW BLD-RTO: 16.6 % (ref 11.7–14.9)
PLATELET # BLD: 256 K/CU MM (ref 140–440)
PMV BLD AUTO: 11 FL (ref 7.5–11.1)
POTASSIUM SERPL-SCNC: 4.4 MMOL/L (ref 3.5–5.1)
RBC # BLD: 4.03 M/CU MM (ref 4.2–5.4)
SODIUM BLD-SCNC: 144 MMOL/L (ref 135–145)
WBC # BLD: 5.9 K/CU MM (ref 4–10.5)

## 2022-07-05 PROCEDURE — 36415 COLL VENOUS BLD VENIPUNCTURE: CPT

## 2022-07-05 PROCEDURE — 85027 COMPLETE CBC AUTOMATED: CPT

## 2022-07-05 PROCEDURE — 80048 BASIC METABOLIC PNL TOTAL CA: CPT

## 2022-09-02 ENCOUNTER — HOSPITAL ENCOUNTER (OUTPATIENT)
Age: 87
Setting detail: SPECIMEN
Discharge: HOME | End: 2022-09-02

## 2022-09-03 LAB
REASON FOR REJECTION: NORMAL
REJECTED TEST: NORMAL

## 2022-09-13 ENCOUNTER — HOSPITAL ENCOUNTER (OUTPATIENT)
Age: 87
Setting detail: SPECIMEN
Discharge: HOME | End: 2022-09-13
Payer: MEDICARE

## 2022-09-13 LAB
ANION GAP SERPL CALCULATED.3IONS-SCNC: 8 MMOL/L (ref 4–16)
BUN BLDV-MCNC: 21 MG/DL (ref 6–23)
CALCIUM SERPL-MCNC: 9 MG/DL (ref 8.3–10.6)
CHLORIDE BLD-SCNC: 103 MMOL/L (ref 99–110)
CO2: 32 MMOL/L (ref 21–32)
CREAT SERPL-MCNC: 1.2 MG/DL (ref 0.6–1.1)
GFR AFRICAN AMERICAN: 51 ML/MIN/1.73M2
GFR NON-AFRICAN AMERICAN: 42 ML/MIN/1.73M2
GLUCOSE BLD-MCNC: 87 MG/DL (ref 70–99)
HCT VFR BLD CALC: 42.8 % (ref 37–47)
HEMOGLOBIN: 13.4 GM/DL (ref 12.5–16)
MCH RBC QN AUTO: 29.6 PG (ref 27–31)
MCHC RBC AUTO-ENTMCNC: 31.3 % (ref 32–36)
MCV RBC AUTO: 94.7 FL (ref 78–100)
PDW BLD-RTO: 13.2 % (ref 11.7–14.9)
PLATELET # BLD: 201 K/CU MM (ref 140–440)
PMV BLD AUTO: 11.5 FL (ref 7.5–11.1)
POTASSIUM SERPL-SCNC: 4 MMOL/L (ref 3.5–5.1)
RBC # BLD: 4.52 M/CU MM (ref 4.2–5.4)
SODIUM BLD-SCNC: 143 MMOL/L (ref 135–145)
WBC # BLD: 6.3 K/CU MM (ref 4–10.5)

## 2022-09-13 PROCEDURE — 85027 COMPLETE CBC AUTOMATED: CPT

## 2022-09-13 PROCEDURE — 36415 COLL VENOUS BLD VENIPUNCTURE: CPT

## 2022-09-13 PROCEDURE — 80048 BASIC METABOLIC PNL TOTAL CA: CPT

## 2022-10-11 ENCOUNTER — HOSPITAL ENCOUNTER (OUTPATIENT)
Age: 87
Setting detail: SPECIMEN
Discharge: HOME | End: 2022-10-11
Payer: MEDICARE

## 2022-10-11 PROCEDURE — 85027 COMPLETE CBC AUTOMATED: CPT

## 2022-10-14 ENCOUNTER — HOSPITAL ENCOUNTER (OUTPATIENT)
Age: 87
Setting detail: SPECIMEN
Discharge: HOME | End: 2022-10-14
Payer: MEDICARE

## 2022-10-14 LAB
ANION GAP SERPL CALCULATED.3IONS-SCNC: 9 MMOL/L (ref 4–16)
BUN BLDV-MCNC: 19 MG/DL (ref 6–23)
CALCIUM SERPL-MCNC: 9.3 MG/DL (ref 8.3–10.6)
CHLORIDE BLD-SCNC: 103 MMOL/L (ref 99–110)
CO2: 27 MMOL/L (ref 21–32)
CREAT SERPL-MCNC: 0.9 MG/DL (ref 0.6–1.1)
GFR AFRICAN AMERICAN: >60 ML/MIN/1.73M2
GFR NON-AFRICAN AMERICAN: 58 ML/MIN/1.73M2
GLUCOSE BLD-MCNC: 100 MG/DL (ref 70–99)
HCT VFR BLD CALC: 45.5 % (ref 37–47)
HEMOGLOBIN: 14.4 GM/DL (ref 12.5–16)
MCH RBC QN AUTO: 29.2 PG (ref 27–31)
MCHC RBC AUTO-ENTMCNC: 31.6 % (ref 32–36)
MCV RBC AUTO: 92.3 FL (ref 78–100)
PDW BLD-RTO: 13.2 % (ref 11.7–14.9)
PLATELET # BLD: 206 K/CU MM (ref 140–440)
PMV BLD AUTO: 11 FL (ref 7.5–11.1)
POTASSIUM SERPL-SCNC: 4.1 MMOL/L (ref 3.5–5.1)
RBC # BLD: 4.93 M/CU MM (ref 4.2–5.4)
SODIUM BLD-SCNC: 139 MMOL/L (ref 135–145)
WBC # BLD: 5.3 K/CU MM (ref 4–10.5)

## 2022-10-14 PROCEDURE — 85027 COMPLETE CBC AUTOMATED: CPT

## 2022-10-14 PROCEDURE — 36415 COLL VENOUS BLD VENIPUNCTURE: CPT

## 2022-10-14 PROCEDURE — 80048 BASIC METABOLIC PNL TOTAL CA: CPT

## 2022-11-08 ENCOUNTER — HOSPITAL ENCOUNTER (OUTPATIENT)
Age: 87
Setting detail: SPECIMEN
Discharge: HOME | End: 2022-11-08
Payer: MEDICARE

## 2022-11-08 LAB
ANION GAP SERPL CALCULATED.3IONS-SCNC: 9 MMOL/L (ref 4–16)
BUN BLDV-MCNC: 18 MG/DL (ref 6–23)
CALCIUM SERPL-MCNC: 8.9 MG/DL (ref 8.3–10.6)
CHLORIDE BLD-SCNC: 104 MMOL/L (ref 99–110)
CO2: 29 MMOL/L (ref 21–32)
CREAT SERPL-MCNC: 1 MG/DL (ref 0.6–1.1)
GFR SERPL CREATININE-BSD FRML MDRD: 52 ML/MIN/1.73M2
GLUCOSE BLD-MCNC: 89 MG/DL (ref 70–99)
HCT VFR BLD CALC: 41.4 % (ref 37–47)
HEMOGLOBIN: 13.1 GM/DL (ref 12.5–16)
MCH RBC QN AUTO: 28.9 PG (ref 27–31)
MCHC RBC AUTO-ENTMCNC: 31.6 % (ref 32–36)
MCV RBC AUTO: 91.2 FL (ref 78–100)
PDW BLD-RTO: 14 % (ref 11.7–14.9)
PLATELET # BLD: 214 K/CU MM (ref 140–440)
PMV BLD AUTO: 11 FL (ref 7.5–11.1)
POTASSIUM SERPL-SCNC: 4.3 MMOL/L (ref 3.5–5.1)
RBC # BLD: 4.54 M/CU MM (ref 4.2–5.4)
SODIUM BLD-SCNC: 142 MMOL/L (ref 135–145)
WBC # BLD: 5.3 K/CU MM (ref 4–10.5)

## 2022-11-08 PROCEDURE — 80048 BASIC METABOLIC PNL TOTAL CA: CPT

## 2022-11-08 PROCEDURE — 36415 COLL VENOUS BLD VENIPUNCTURE: CPT

## 2022-11-08 PROCEDURE — 85027 COMPLETE CBC AUTOMATED: CPT

## 2022-12-13 ENCOUNTER — HOSPITAL ENCOUNTER (OUTPATIENT)
Age: 87
Setting detail: SPECIMEN
Discharge: HOME | End: 2022-12-13
Payer: MEDICARE

## 2022-12-13 LAB
ALBUMIN SERPL-MCNC: 3.2 GM/DL (ref 3.4–5)
ALP BLD-CCNC: 68 IU/L (ref 40–128)
ALT SERPL-CCNC: 10 U/L (ref 10–40)
ANION GAP SERPL CALCULATED.3IONS-SCNC: 11 MMOL/L (ref 4–16)
AST SERPL-CCNC: 16 IU/L (ref 15–37)
BILIRUB SERPL-MCNC: 0.3 MG/DL (ref 0–1)
BUN BLDV-MCNC: 22 MG/DL (ref 6–23)
CALCIUM SERPL-MCNC: 8.8 MG/DL (ref 8.3–10.6)
CHLORIDE BLD-SCNC: 100 MMOL/L (ref 99–110)
CO2: 29 MMOL/L (ref 21–32)
CREAT SERPL-MCNC: 1 MG/DL (ref 0.6–1.1)
GFR SERPL CREATININE-BSD FRML MDRD: 52 ML/MIN/1.73M2
GLUCOSE BLD-MCNC: 78 MG/DL (ref 70–99)
HCT VFR BLD CALC: 44.7 % (ref 37–47)
HEMOGLOBIN: 13.5 GM/DL (ref 12.5–16)
MCH RBC QN AUTO: 29.2 PG (ref 27–31)
MCHC RBC AUTO-ENTMCNC: 30.2 % (ref 32–36)
MCV RBC AUTO: 96.5 FL (ref 78–100)
PDW BLD-RTO: 14.3 % (ref 11.7–14.9)
PLATELET # BLD: 208 K/CU MM (ref 140–440)
PMV BLD AUTO: 11.8 FL (ref 7.5–11.1)
POTASSIUM SERPL-SCNC: 4.3 MMOL/L (ref 3.5–5.1)
RBC # BLD: 4.63 M/CU MM (ref 4.2–5.4)
SODIUM BLD-SCNC: 140 MMOL/L (ref 135–145)
TOTAL PROTEIN: 6 GM/DL (ref 6.4–8.2)
WBC # BLD: 6.4 K/CU MM (ref 4–10.5)

## 2022-12-13 PROCEDURE — 36415 COLL VENOUS BLD VENIPUNCTURE: CPT

## 2022-12-13 PROCEDURE — 80053 COMPREHEN METABOLIC PANEL: CPT

## 2022-12-13 PROCEDURE — 85027 COMPLETE CBC AUTOMATED: CPT

## 2023-01-10 ENCOUNTER — HOSPITAL ENCOUNTER (OUTPATIENT)
Age: 88
Setting detail: SPECIMEN
Discharge: HOME | End: 2023-01-10
Payer: MEDICARE

## 2023-01-10 LAB
ANION GAP SERPL CALCULATED.3IONS-SCNC: 11 MMOL/L (ref 4–16)
BUN BLDV-MCNC: 24 MG/DL (ref 6–23)
CALCIUM SERPL-MCNC: 8.7 MG/DL (ref 8.3–10.6)
CHLORIDE BLD-SCNC: 104 MMOL/L (ref 99–110)
CO2: 27 MMOL/L (ref 21–32)
CREAT SERPL-MCNC: 1 MG/DL (ref 0.6–1.1)
GFR SERPL CREATININE-BSD FRML MDRD: 52 ML/MIN/1.73M2
GLUCOSE BLD-MCNC: 89 MG/DL (ref 70–99)
HCT VFR BLD CALC: 40.2 % (ref 37–47)
HEMOGLOBIN: 12.5 GM/DL (ref 12.5–16)
MCH RBC QN AUTO: 29.9 PG (ref 27–31)
MCHC RBC AUTO-ENTMCNC: 31.1 % (ref 32–36)
MCV RBC AUTO: 96.2 FL (ref 78–100)
PDW BLD-RTO: 13.7 % (ref 11.7–14.9)
PLATELET # BLD: 179 K/CU MM (ref 140–440)
PMV BLD AUTO: 11.6 FL (ref 7.5–11.1)
POTASSIUM SERPL-SCNC: 4.6 MMOL/L (ref 3.5–5.1)
RBC # BLD: 4.18 M/CU MM (ref 4.2–5.4)
SODIUM BLD-SCNC: 142 MMOL/L (ref 135–145)
WBC # BLD: 6.5 K/CU MM (ref 4–10.5)

## 2023-01-10 PROCEDURE — 85027 COMPLETE CBC AUTOMATED: CPT

## 2023-01-10 PROCEDURE — 84443 ASSAY THYROID STIM HORMONE: CPT

## 2023-01-10 PROCEDURE — 36415 COLL VENOUS BLD VENIPUNCTURE: CPT

## 2023-01-10 PROCEDURE — 80048 BASIC METABOLIC PNL TOTAL CA: CPT

## 2023-01-12 LAB
REASON FOR REJECTION: NORMAL
REJECTED TEST: NORMAL

## 2023-01-13 ENCOUNTER — HOSPITAL ENCOUNTER (OUTPATIENT)
Age: 88
Setting detail: SPECIMEN
Discharge: HOME | End: 2023-01-13
Payer: MEDICARE

## 2023-01-13 LAB — TSH HIGH SENSITIVITY: 1.92 UIU/ML (ref 0.27–4.2)

## 2023-01-13 PROCEDURE — 36415 COLL VENOUS BLD VENIPUNCTURE: CPT

## 2023-01-13 PROCEDURE — 84443 ASSAY THYROID STIM HORMONE: CPT

## 2023-02-14 ENCOUNTER — HOSPITAL ENCOUNTER (OUTPATIENT)
Age: 88
Setting detail: SPECIMEN
Discharge: HOME | End: 2023-02-14
Payer: MEDICARE

## 2023-02-14 LAB
ANION GAP SERPL CALCULATED.3IONS-SCNC: 11 MMOL/L (ref 4–16)
BUN SERPL-MCNC: 29 MG/DL (ref 6–23)
CALCIUM SERPL-MCNC: 8.8 MG/DL (ref 8.3–10.6)
CHLORIDE BLD-SCNC: 103 MMOL/L (ref 99–110)
CO2: 28 MMOL/L (ref 21–32)
CREAT SERPL-MCNC: 1.2 MG/DL (ref 0.6–1.1)
GFR SERPL CREATININE-BSD FRML MDRD: 42 ML/MIN/1.73M2
GLUCOSE SERPL-MCNC: 118 MG/DL (ref 70–99)
HCT VFR BLD CALC: 41.9 % (ref 37–47)
HEMOGLOBIN: 13.2 GM/DL (ref 12.5–16)
MCH RBC QN AUTO: 29.9 PG (ref 27–31)
MCHC RBC AUTO-ENTMCNC: 31.5 % (ref 32–36)
MCV RBC AUTO: 95 FL (ref 78–100)
PDW BLD-RTO: 13.4 % (ref 11.7–14.9)
PLATELET # BLD: 231 K/CU MM (ref 140–440)
PMV BLD AUTO: 11.3 FL (ref 7.5–11.1)
POTASSIUM SERPL-SCNC: 3.7 MMOL/L (ref 3.5–5.1)
RBC # BLD: 4.41 M/CU MM (ref 4.2–5.4)
SODIUM BLD-SCNC: 142 MMOL/L (ref 135–145)
WBC # BLD: 7 K/CU MM (ref 4–10.5)

## 2023-02-14 PROCEDURE — 36415 COLL VENOUS BLD VENIPUNCTURE: CPT

## 2023-02-14 PROCEDURE — 85027 COMPLETE CBC AUTOMATED: CPT

## 2023-02-14 PROCEDURE — 80048 BASIC METABOLIC PNL TOTAL CA: CPT

## 2023-03-14 ENCOUNTER — HOSPITAL ENCOUNTER (OUTPATIENT)
Age: 88
Setting detail: SPECIMEN
Discharge: HOME | End: 2023-03-14
Payer: MEDICARE

## 2023-03-14 LAB
ANION GAP SERPL CALCULATED.3IONS-SCNC: 9 MMOL/L (ref 4–16)
BUN SERPL-MCNC: 27 MG/DL (ref 6–23)
CALCIUM SERPL-MCNC: 8.3 MG/DL (ref 8.3–10.6)
CHLORIDE BLD-SCNC: 105 MMOL/L (ref 99–110)
CO2: 29 MMOL/L (ref 21–32)
CREAT SERPL-MCNC: 1.2 MG/DL (ref 0.6–1.1)
GFR SERPL CREATININE-BSD FRML MDRD: 42 ML/MIN/1.73M2
GLUCOSE SERPL-MCNC: 99 MG/DL (ref 70–99)
HCT VFR BLD CALC: 38.5 % (ref 37–47)
HEMOGLOBIN: 11.9 GM/DL (ref 12.5–16)
MCH RBC QN AUTO: 29.5 PG (ref 27–31)
MCHC RBC AUTO-ENTMCNC: 30.9 % (ref 32–36)
MCV RBC AUTO: 95.5 FL (ref 78–100)
PDW BLD-RTO: 13.6 % (ref 11.7–14.9)
PLATELET # BLD: 203 K/CU MM (ref 140–440)
PMV BLD AUTO: 11 FL (ref 7.5–11.1)
POTASSIUM SERPL-SCNC: 3.8 MMOL/L (ref 3.5–5.1)
RBC # BLD: 4.03 M/CU MM (ref 4.2–5.4)
SODIUM BLD-SCNC: 143 MMOL/L (ref 135–145)
WBC # BLD: 6.3 K/CU MM (ref 4–10.5)

## 2023-03-14 PROCEDURE — 83036 HEMOGLOBIN GLYCOSYLATED A1C: CPT

## 2023-03-14 PROCEDURE — 85027 COMPLETE CBC AUTOMATED: CPT

## 2023-03-14 PROCEDURE — 80048 BASIC METABOLIC PNL TOTAL CA: CPT

## 2023-03-14 PROCEDURE — 36415 COLL VENOUS BLD VENIPUNCTURE: CPT

## 2023-04-18 ENCOUNTER — HOSPITAL ENCOUNTER (OUTPATIENT)
Age: 88
Setting detail: SPECIMEN
Discharge: HOME | End: 2023-04-18
Payer: MEDICARE

## 2023-04-18 LAB
BILIRUBIN URINE: NEGATIVE MG/DL
BLOOD, URINE: NEGATIVE
CLARITY: CLEAR
COLOR: YELLOW
COMMENT UA: NORMAL
GLUCOSE, URINE: NEGATIVE MG/DL
KETONES, URINE: NEGATIVE MG/DL
LEUKOCYTE ESTERASE, URINE: NEGATIVE
NITRITE URINE, QUANTITATIVE: NEGATIVE
PH, URINE: 5.5 (ref 5–8)
PROTEIN UA: NEGATIVE MG/DL
SPECIFIC GRAVITY UA: 1.01 (ref 1–1.03)
UROBILINOGEN, URINE: 0.2 MG/DL (ref 0.2–1)

## 2023-04-18 PROCEDURE — 87086 URINE CULTURE/COLONY COUNT: CPT

## 2023-04-18 PROCEDURE — 81003 URINALYSIS AUTO W/O SCOPE: CPT

## 2023-04-19 LAB
CULTURE: ABNORMAL
CULTURE: ABNORMAL
Lab: ABNORMAL
SPECIMEN: ABNORMAL

## 2023-05-09 ENCOUNTER — HOSPITAL ENCOUNTER (OUTPATIENT)
Age: 88
Setting detail: SPECIMEN
Discharge: HOME | End: 2023-05-09
Payer: MEDICARE

## 2023-05-09 LAB
ANION GAP SERPL CALCULATED.3IONS-SCNC: 9 MMOL/L (ref 4–16)
BUN SERPL-MCNC: 29 MG/DL (ref 6–23)
CALCIUM SERPL-MCNC: 8.7 MG/DL (ref 8.3–10.6)
CHLORIDE BLD-SCNC: 106 MMOL/L (ref 99–110)
CO2: 27 MMOL/L (ref 21–32)
CREAT SERPL-MCNC: 1.3 MG/DL (ref 0.6–1.1)
GFR SERPL CREATININE-BSD FRML MDRD: 38 ML/MIN/1.73M2
GLUCOSE SERPL-MCNC: 93 MG/DL (ref 70–99)
HCT VFR BLD CALC: 41.7 % (ref 37–47)
HEMOGLOBIN: 12.8 GM/DL (ref 12.5–16)
MCH RBC QN AUTO: 29 PG (ref 27–31)
MCHC RBC AUTO-ENTMCNC: 30.7 % (ref 32–36)
MCV RBC AUTO: 94.6 FL (ref 78–100)
PDW BLD-RTO: 13.1 % (ref 11.7–14.9)
PLATELET # BLD: 199 K/CU MM (ref 140–440)
PMV BLD AUTO: 11 FL (ref 7.5–11.1)
POTASSIUM SERPL-SCNC: 4.2 MMOL/L (ref 3.5–5.1)
RBC # BLD: 4.41 M/CU MM (ref 4.2–5.4)
SODIUM BLD-SCNC: 142 MMOL/L (ref 135–145)
WBC # BLD: 6.8 K/CU MM (ref 4–10.5)

## 2023-05-09 PROCEDURE — 80048 BASIC METABOLIC PNL TOTAL CA: CPT

## 2023-05-09 PROCEDURE — 36415 COLL VENOUS BLD VENIPUNCTURE: CPT

## 2023-05-09 PROCEDURE — 85027 COMPLETE CBC AUTOMATED: CPT

## 2023-10-25 NOTE — CARE COORDINATION
Pt called back and had large gush of fluid along with passing of clots/tissue.   Pt went to ER for evaluation.   Pt aware to follow up with office after ER evaluation.    Wil sent the updated notes that were requested by the doctor in the peer to peer on Friday. Wil stated she has not heard from the insurance  LSW requested updated notes for today. Precert is still pending.

## 2023-11-21 ENCOUNTER — APPOINTMENT (OUTPATIENT)
Dept: CT IMAGING | Age: 88
End: 2023-11-21
Attending: EMERGENCY MEDICINE
Payer: MEDICARE

## 2023-11-21 ENCOUNTER — HOSPITAL ENCOUNTER (EMERGENCY)
Age: 88
Discharge: NURSING, REHAB OR LONG-TERM CARE | End: 2023-11-21
Attending: EMERGENCY MEDICINE
Payer: MEDICARE

## 2023-11-21 VITALS
RESPIRATION RATE: 15 BRPM | OXYGEN SATURATION: 94 % | TEMPERATURE: 96.9 F | DIASTOLIC BLOOD PRESSURE: 51 MMHG | SYSTOLIC BLOOD PRESSURE: 147 MMHG | HEART RATE: 36 BPM

## 2023-11-21 DIAGNOSIS — S09.90XA CLOSED HEAD INJURY, INITIAL ENCOUNTER: Primary | ICD-10-CM

## 2023-11-21 DIAGNOSIS — R00.1 ASYMPTOMATIC BRADYCARDIA: ICD-10-CM

## 2023-11-21 LAB
ALBUMIN SERPL-MCNC: 3.8 GM/DL (ref 3.4–5)
ALP BLD-CCNC: 90 IU/L (ref 40–129)
ALT SERPL-CCNC: 20 U/L (ref 10–40)
ANION GAP SERPL CALCULATED.3IONS-SCNC: 9 MMOL/L (ref 4–16)
AST SERPL-CCNC: 15 IU/L (ref 15–37)
BASOPHILS ABSOLUTE: 0 K/CU MM
BASOPHILS RELATIVE PERCENT: 0.6 % (ref 0–1)
BILIRUB SERPL-MCNC: 0.7 MG/DL (ref 0–1)
BUN SERPL-MCNC: 26 MG/DL (ref 6–23)
CALCIUM SERPL-MCNC: 9.3 MG/DL (ref 8.3–10.6)
CHLORIDE BLD-SCNC: 105 MMOL/L (ref 99–110)
CO2: 30 MMOL/L (ref 21–32)
CREAT SERPL-MCNC: 1.3 MG/DL (ref 0.6–1.1)
DIFFERENTIAL TYPE: ABNORMAL
EOSINOPHILS ABSOLUTE: 0.3 K/CU MM
EOSINOPHILS RELATIVE PERCENT: 5.8 % (ref 0–3)
GFR SERPL CREATININE-BSD FRML MDRD: 38 ML/MIN/1.73M2
GLUCOSE SERPL-MCNC: 105 MG/DL (ref 70–99)
HCT VFR BLD CALC: 47.3 % (ref 37–47)
HEMOGLOBIN: 14.6 GM/DL (ref 12.5–16)
IMMATURE NEUTROPHIL %: 0.4 % (ref 0–0.43)
LYMPHOCYTES ABSOLUTE: 1.6 K/CU MM
LYMPHOCYTES RELATIVE PERCENT: 32.5 % (ref 24–44)
MAGNESIUM: 2.5 MG/DL (ref 1.8–2.4)
MCH RBC QN AUTO: 29.2 PG (ref 27–31)
MCHC RBC AUTO-ENTMCNC: 30.9 % (ref 32–36)
MCV RBC AUTO: 94.6 FL (ref 78–100)
MONOCYTES ABSOLUTE: 0.5 K/CU MM
MONOCYTES RELATIVE PERCENT: 9.8 % (ref 0–4)
NUCLEATED RBC %: 0 %
PDW BLD-RTO: 13.1 % (ref 11.7–14.9)
PLATELET # BLD: 176 K/CU MM (ref 140–440)
PMV BLD AUTO: 11.6 FL (ref 7.5–11.1)
POTASSIUM SERPL-SCNC: 4.3 MMOL/L (ref 3.5–5.1)
RBC # BLD: 5 M/CU MM (ref 4.2–5.4)
SEGMENTED NEUTROPHILS ABSOLUTE COUNT: 2.6 K/CU MM
SEGMENTED NEUTROPHILS RELATIVE PERCENT: 50.9 % (ref 36–66)
SODIUM BLD-SCNC: 144 MMOL/L (ref 135–145)
TOTAL IMMATURE NEUTOROPHIL: 0.02 K/CU MM
TOTAL NUCLEATED RBC: 0 K/CU MM
TOTAL PROTEIN: 7.3 GM/DL (ref 6.4–8.2)
WBC # BLD: 5 K/CU MM (ref 4–10.5)

## 2023-11-21 PROCEDURE — 76376 3D RENDER W/INTRP POSTPROCES: CPT

## 2023-11-21 PROCEDURE — 80053 COMPREHEN METABOLIC PANEL: CPT

## 2023-11-21 PROCEDURE — 71250 CT THORAX DX C-: CPT

## 2023-11-21 PROCEDURE — 72125 CT NECK SPINE W/O DYE: CPT

## 2023-11-21 PROCEDURE — 99284 EMERGENCY DEPT VISIT MOD MDM: CPT

## 2023-11-21 PROCEDURE — 83735 ASSAY OF MAGNESIUM: CPT

## 2023-11-21 PROCEDURE — 70450 CT HEAD/BRAIN W/O DYE: CPT

## 2023-11-21 PROCEDURE — 93005 ELECTROCARDIOGRAM TRACING: CPT | Performed by: EMERGENCY MEDICINE

## 2023-11-21 PROCEDURE — 85025 COMPLETE CBC W/AUTO DIFF WBC: CPT

## 2023-11-21 RX ORDER — CLONAZEPAM 0.5 MG/1
0.25 TABLET ORAL 2 TIMES DAILY
COMMUNITY
Start: 2023-11-03

## 2023-11-21 NOTE — ED PROVIDER NOTES
Triage Chief Complaint:   Fall (On blood thinners)    [place]:  Mady Bolivar is a 80 y.o. female that presents following a fall from the nursing home. Patient was in baseline state of health until this afternoon when she apparently had a witnessed fall at her nursing home. Patient did sustain a head injury. No loss of consciousness. Patient is on Xarelto of anticoagulation. EMS reports that patient seemed to have some right hip pain but she cannot tell me exactly what is hurting at this time. Patient is with significant confusion and dementia which per nursing home staff is at baseline but does limit history as above. ROS:  Limited as above. Past Medical History:   Diagnosis Date    Hypertension      Past Surgical History:   Procedure Laterality Date    COLONOSCOPY N/A 2021    COLONOSCOPY DIAGNOSTIC performed by Ambreen Norris MD at [facility] the [facility] ENDOSCOPY     No family history on file. Social History     Socioeconomic History    Marital status:      Spouse name: Not on file    Number of children: Not on file    Years of education: Not on file    Highest education level: Not on file   Occupational History    Not on file   Tobacco Use    Smoking status: Former     Packs/day: .25     Types: Cigarettes     Quit date: 2019     Years since quittin.8    Smokeless tobacco: Never   Substance and Sexual Activity    Alcohol use: Never    Drug use: Not on file    Sexual activity: Not on file   Other Topics Concern    Not on file   Social History Narrative    Not on file     Social Determinants of Health     Financial Resource Strain: Not on file   Food Insecurity: Not on file   Transportation Needs: Not on file   Physical Activity: Not on file   Stress: Not on file   Social Connections: Not on file   Intimate Partner Violence: Not on file   Housing Stability: Not on file     No current facility-administered medications for this encounter.      Current Outpatient Medications   Medication Sig Dispense Refill

## 2023-11-21 NOTE — ED NOTES
Pt to ED via EMS from [facility] after having a witnessed fall. Pt is on blood thinners, and is complaining of right hip pain.       Kody Ying RN  11/21/23 6022

## 2023-11-21 NOTE — ED NOTES
Attempted to call [facility] to give update on pt but unable to get through.       Saleem Galvan, RN  11/21/23 8685

## 2023-11-22 LAB
EKG ATRIAL RATE: 37 BPM
EKG DIAGNOSIS: NORMAL
EKG Q-T INTERVAL: 538 MS
EKG QRS DURATION: 122 MS
EKG QTC CALCULATION (BAZETT): 422 MS
EKG R AXIS: -42 DEGREES
EKG T AXIS: 6 DEGREES
EKG VENTRICULAR RATE: 37 BPM

## 2023-11-22 PROCEDURE — 93010 ELECTROCARDIOGRAM REPORT: CPT | Performed by: INTERNAL MEDICINE
